# Patient Record
Sex: FEMALE | Race: WHITE | Employment: OTHER | URBAN - METROPOLITAN AREA
[De-identification: names, ages, dates, MRNs, and addresses within clinical notes are randomized per-mention and may not be internally consistent; named-entity substitution may affect disease eponyms.]

---

## 2018-07-21 ENCOUNTER — ED HISTORICAL/CONVERTED ENCOUNTER (OUTPATIENT)
Dept: OTHER | Age: 28
End: 2018-07-21

## 2019-03-13 ENCOUNTER — HOSPITAL ENCOUNTER (OUTPATIENT)
Dept: MRI IMAGING | Age: 29
Discharge: HOME | End: 2019-03-13
Attending: FAMILY MEDICINE
Payer: MEDICAID

## 2019-03-13 ENCOUNTER — HOSPITAL ENCOUNTER (OUTPATIENT)
Dept: GENERAL RADIOLOGY | Age: 29
Discharge: HOME | End: 2019-03-13
Attending: FAMILY MEDICINE
Payer: MEDICAID

## 2019-03-13 DIAGNOSIS — M75.111 INCOMPLETE TEAR OF RIGHT ROTATOR CUFF: ICD-10-CM

## 2019-03-13 DIAGNOSIS — S43.431A TEAR OF RIGHT GLENOID LABRUM: ICD-10-CM

## 2019-03-13 PROCEDURE — 74011250636 HC RX REV CODE- 250/636: Performed by: RADIOLOGY

## 2019-03-13 PROCEDURE — A9575 INJ GADOTERATE MEGLUMI 0.1ML: HCPCS | Performed by: RADIOLOGY

## 2019-03-13 PROCEDURE — 23350 INJECTION FOR SHOULDER X-RAY: CPT

## 2019-03-13 PROCEDURE — 74011636320 HC RX REV CODE- 636/320: Performed by: RADIOLOGY

## 2019-03-13 PROCEDURE — 73222 MRI JOINT UPR EXTREM W/DYE: CPT

## 2019-03-13 RX ORDER — GADOTERATE MEGLUMINE 376.9 MG/ML
2 INJECTION INTRAVENOUS
Status: COMPLETED | OUTPATIENT
Start: 2019-03-13 | End: 2019-03-13

## 2019-03-13 RX ORDER — LIDOCAINE HYDROCHLORIDE 10 MG/ML
5 INJECTION, SOLUTION EPIDURAL; INFILTRATION; INTRACAUDAL; PERINEURAL
Status: COMPLETED | OUTPATIENT
Start: 2019-03-13 | End: 2019-03-13

## 2019-03-13 RX ADMIN — LIDOCAINE HYDROCHLORIDE 5 ML: 10 INJECTION, SOLUTION EPIDURAL; INFILTRATION; INTRACAUDAL; PERINEURAL at 14:53

## 2019-03-13 RX ADMIN — IOHEXOL 10 ML: 300 INJECTION, SOLUTION INTRAVENOUS at 14:53

## 2019-03-13 RX ADMIN — GADOTERATE MEGLUMINE 2 ML: 376.9 INJECTION INTRAVENOUS at 14:53

## 2019-04-19 ENCOUNTER — OFFICE VISIT (OUTPATIENT)
Dept: FAMILY MEDICINE CLINIC | Age: 29
End: 2019-04-19

## 2019-04-19 VITALS
TEMPERATURE: 98.6 F | OXYGEN SATURATION: 100 % | DIASTOLIC BLOOD PRESSURE: 87 MMHG | RESPIRATION RATE: 18 BRPM | HEART RATE: 90 BPM | BODY MASS INDEX: 24.11 KG/M2 | SYSTOLIC BLOOD PRESSURE: 128 MMHG | HEIGHT: 62 IN | WEIGHT: 131 LBS

## 2019-04-19 DIAGNOSIS — F41.1 GAD (GENERALIZED ANXIETY DISORDER): Primary | ICD-10-CM

## 2019-04-19 RX ORDER — LEVONORGESTREL AND ETHINYL ESTRADIOL 0.1-0.02MG
KIT ORAL
Refills: 3 | COMMUNITY
Start: 2019-04-10 | End: 2020-06-16

## 2019-04-19 RX ORDER — CLONAZEPAM 0.5 MG/1
0.5 TABLET ORAL
Qty: 30 TAB | Refills: 0 | Status: SHIPPED | OUTPATIENT
Start: 2019-04-19 | End: 2019-07-15 | Stop reason: SDUPTHER

## 2019-04-19 RX ORDER — NAPROXEN 500 MG/1
TABLET ORAL
COMMUNITY
Start: 2019-03-18 | End: 2020-01-05

## 2019-04-19 NOTE — PROGRESS NOTES
Chief Complaint Patient presents with [address] [place] [address]  Anxiety Patient in office today to [facility]. Pt would like to discuss med regimen for anxiety. Pt has hx of PTSD and bipolar. Pt is currently seeing therapy John Reyes @ [facility]. Pt has previously been diagnosed with bipolar about 8 years ago. Has not recently been on meds and cannot recall what she was on before. Thinks that PTSD triggered her bipolar. Better controlled now. Seeing therapist weekly. Would prefer to take something as needed. Only having episodic anxiety that happens every few weeks. Usually will experience increased SOB, dyspnea, fidgety, CP, heart racing. Depends on the situation with how long it lasts. Denies any personal or family history of addiction or substance abuse. Denies any SI. Currently on medical leave for shoulder injury. Seeing Dr. Velma Bolton. Denies any other concerns at this time. Chief Complaint Patient presents with [address] [place] [address]  Anxiety  
 
she is a 33y.o. year old female who presents for evalution. Reviewed PmHx, RxHx, FmHx, SocHx, AllgHx and updated and dated in the chart. Review of Systems - negative except as listed above in the HPI Objective:  
 
Vitals:  
 04/19/19 1350 BP: 128/87 Pulse: 90 Resp: 18 Temp: 98.6 °F (37 °C) SpO2: 100% Weight: 131 lb (59.4 kg) Height: 5' 2\" (1.575 m) Physical Examination: General appearance - alert, well appearing, and in no distress Mental status - normal mood, behavior, speech, dress, motor activity, and thought processes Chest - clear to auscultation, no wheezes, rales or rhonchi, symmetric air entry Heart - normal rate, regular rhythm, normal S1, S2, no murmurs Assessment/ Plan:  
Diagnoses and all orders for this visit: 1. DEANDRA (generalized anxiety disorder) -     clonazePAM (KLONOPIN) 0.5 mg tablet; Take 1 Tab by mouth daily as needed (anixety). Start klonopin daily as needed for acute anxiety sx. Reinforced SEs/ADRs, only taking as prescribed, do not mix with etoh, and taking for acute sx of anxiety only. Enc pt to follow up if anxiety sx persist or worsen to discuss alt med regimen. Follow-up and Dispositions · Return if symptoms worsen or fail to improve. I have discussed the diagnosis with the patient and the intended plan as seen in the above orders. The patient has received an after-visit summary and questions were answered concerning future plans. Medication Side Effects and Warnings were discussed with patient: yes Patient Labs were reviewed and or requested: no 
Patient Past Records were reviewed and or requested  yes Patient / Caregiver Understanding of treatment plan was verbalized during office visit YES SHANTHI Delarosa Patient Instructions Benzodiazepines: Potential side effects of benzodiazepine medications include, but are not limited to, the possibility of \"paradoxical agitation\" with irritability, aggressiveness or stimulated behavior; clumsiness, slurring of speech, dulled facies, psychomotor impairment, anterograde amnesia, impaired awareness of degree of drug effect, visual and hearing sensitivity impairment, other psychiatric/behavioral disturbances, impacts operating certain machinery or engaging in certain activities or employment, anxiety, insomnia, anorexia, tremor, nausea, vomiting, diarrhea and potential to develop tolerance, dependence, addiction and death from overdose. Use caution while taking benzodiazepines. There is a potential to overdose on this medication when too many pills are taken at one time. Do not mix alcohol with this medication because it can worsen side effects and be very dangerous.

## 2019-04-19 NOTE — PROGRESS NOTES
Chief Complaint Patient presents with [facility] Establish Care  Anxiety Patient in office today to [facility] care. Pt would like to discuss med regimen for anxiety. Pt has hx of PTSD and bipolar. Pt is currently seeing therapy William Zuniga @ [facility].

## 2019-04-29 ENCOUNTER — DOCUMENTATION ONLY (OUTPATIENT)
Dept: FAMILY MEDICINE CLINIC | Age: 29
End: 2019-04-29

## 2019-04-29 NOTE — PROGRESS NOTES
Grey Arias 984 for medical records was faxed on 4/26/19 to [address] [place] [address] to be processed

## 2019-05-30 NOTE — PERIOP NOTES
[address] [address] [facility]                            MAIN OR                                  [phone]   MAIN PRE OP                          [phone]                                                                                AMBULATORY PRE OP          [phone]  PRE-ADMISSION TESTING    [phone]     Surgery Date:   6/12/19         Is surgery arrival time given by surgeon? NO  If NO, [facility] staff will call you between 3 and 7pm the day before your surgery with your arrival time. (If your surgery is on a Monday, we will call you the Friday before.)    Call [phone] after 7pm Monday-Friday if you did not receive your arrival time. INSTRUCTIONS BEFORE YOUR SURGERY   When You  Arrive   Arrive at the 2nd [address] [facility] on the day of your surgery  Have your insurance card, photo ID, and any copayment (if needed)     Food   and   Drink   NO food or drink after midnight the night before surgery    This means NO water, gum, mints, coffee, juice, etc.  No alcohol (beer, wine, liquor) 24 hours before and after surgery     Medications to   TAKE   Morning of Surgery   MEDICATIONS TO TAKE THE MORNING OF SURGERY WITH A SIP OF WATER:    Tylenol, Klonopin      Medications  To  STOP      7 days before surgery    Non-Steroidal anti-inflammatory Drugs (NSAID's): for example, Ibuprofen (Advil, Motrin), Naproxen (Aleve)   Aspirin, if taking for pain    Herbal supplements, vitamins, and fish oil   Other:  (Pain medications not listed above, including Tylenol may be taken)   Blood  Thinners    If you take  Aspirin, Plavix, Coumadin, or any blood-thinning or anti-blood clot medicine, talk to the doctor who prescribed the medications for pre-operative instructions.      Bathing Clothing  Jewelry  Valuables       If you shower the morning of surgery, please do not apply anything to your skin (lotions, powders, deodorant, or makeup, especially mascara)   Follow all special bath instructions (for total joint replacement, spine and bowel surgeries)   Do not shave or trim anywhere 24 hours before surgery   Wear your hair loose or down; no pony-tails, buns, or metal hair clips   Wear loose, comfortable, clean clothes   Wear glasses instead of contacts   Leave money, valuables, and jewelry, including body piercings, at home     Going Home       or Spending the Night    SAME-DAY SURGERY: You must have a responsible adult drive you home and stay with you 24 hours after surgery   ADMITS: If your doctor is keeping you into the hospital after surgery, leave personal belongings/luggage in your car until you have a hospital room number. Hospital discharge time is 12 noon  Drivers must be here before 12 noon unless you are told differently   Special Instructions   Free  parking after 7am, no tipping. Follow all instructions so your surgery wont be cancelled. Please, be on time. If a situation occurs and you are delayed the day of surgery, call [phone] or          [phone]. If your physical condition changes (like a fever, cold, flu, etc.) call your surgeon. The patient was contacted  via phone. Home medication reviewed and verified during PAT appointment. The patient verbalizes understanding of all instructions and does not  need reinforcement.

## 2019-06-11 ENCOUNTER — ANESTHESIA EVENT (OUTPATIENT)
Dept: SURGERY | Age: 29
End: 2019-06-11
Payer: MEDICAID

## 2019-06-11 RX ORDER — SODIUM CHLORIDE 0.9 % (FLUSH) 0.9 %
5-40 SYRINGE (ML) INJECTION EVERY 8 HOURS
Status: CANCELLED | OUTPATIENT
Start: 2019-06-11

## 2019-06-11 RX ORDER — DIPHENHYDRAMINE HYDROCHLORIDE 50 MG/ML
12.5 INJECTION, SOLUTION INTRAMUSCULAR; INTRAVENOUS AS NEEDED
Status: CANCELLED | OUTPATIENT
Start: 2019-06-11 | End: 2019-06-11

## 2019-06-11 RX ORDER — SODIUM CHLORIDE 0.9 % (FLUSH) 0.9 %
5-40 SYRINGE (ML) INJECTION AS NEEDED
Status: CANCELLED | OUTPATIENT
Start: 2019-06-11

## 2019-06-11 RX ORDER — SODIUM CHLORIDE, SODIUM LACTATE, POTASSIUM CHLORIDE, CALCIUM CHLORIDE 600; 310; 30; 20 MG/100ML; MG/100ML; MG/100ML; MG/100ML
125 INJECTION, SOLUTION INTRAVENOUS CONTINUOUS
Status: CANCELLED | OUTPATIENT
Start: 2019-06-11

## 2019-06-11 RX ORDER — HYDROMORPHONE HYDROCHLORIDE 1 MG/ML
.25-1 INJECTION, SOLUTION INTRAMUSCULAR; INTRAVENOUS; SUBCUTANEOUS
Status: CANCELLED | OUTPATIENT
Start: 2019-06-11

## 2019-06-12 ENCOUNTER — ANESTHESIA (OUTPATIENT)
Dept: SURGERY | Age: 29
End: 2019-06-12
Payer: MEDICAID

## 2019-06-12 ENCOUNTER — HOSPITAL ENCOUNTER (OUTPATIENT)
Age: 29
Discharge: HOME | End: 2019-06-12
Attending: STUDENT IN AN ORGANIZED HEALTH CARE EDUCATION/TRAINING PROGRAM | Admitting: STUDENT IN AN ORGANIZED HEALTH CARE EDUCATION/TRAINING PROGRAM
Payer: MEDICAID

## 2019-06-12 VITALS
OXYGEN SATURATION: 98 % | RESPIRATION RATE: 18 BRPM | HEART RATE: 95 BPM | TEMPERATURE: 97.6 F | HEIGHT: 62 IN | BODY MASS INDEX: 24.14 KG/M2 | DIASTOLIC BLOOD PRESSURE: 85 MMHG | SYSTOLIC BLOOD PRESSURE: 125 MMHG | WEIGHT: 131.17 LBS

## 2019-06-12 PROBLEM — N87.1 CIN II (CERVICAL INTRAEPITHELIAL NEOPLASIA II): Status: ACTIVE | Noted: 2019-06-12

## 2019-06-12 LAB — HCG UR QL: NEGATIVE

## 2019-06-12 PROCEDURE — 77030007304 HC ELECTRD LP RND MEGA -A: Performed by: STUDENT IN AN ORGANIZED HEALTH CARE EDUCATION/TRAINING PROGRAM

## 2019-06-12 PROCEDURE — 76060000061 HC AMB SURG ANES 0.5 TO 1 HR: Performed by: STUDENT IN AN ORGANIZED HEALTH CARE EDUCATION/TRAINING PROGRAM

## 2019-06-12 PROCEDURE — 76030000000 HC AMB SURG OR TIME 0.5 TO 1: Performed by: STUDENT IN AN ORGANIZED HEALTH CARE EDUCATION/TRAINING PROGRAM

## 2019-06-12 PROCEDURE — 77030003666 HC NDL SPINAL BD -A: Performed by: STUDENT IN AN ORGANIZED HEALTH CARE EDUCATION/TRAINING PROGRAM

## 2019-06-12 PROCEDURE — 76210000034 HC AMBSU PH I REC 0.5 TO 1 HR: Performed by: STUDENT IN AN ORGANIZED HEALTH CARE EDUCATION/TRAINING PROGRAM

## 2019-06-12 PROCEDURE — 77030020782 HC GWN BAIR PAWS FLX 3M -B

## 2019-06-12 PROCEDURE — 74011250636 HC RX REV CODE- 250/636

## 2019-06-12 PROCEDURE — 81025 URINE PREGNANCY TEST: CPT

## 2019-06-12 PROCEDURE — 77030011640 HC PAD GRND REM COVD -A: Performed by: STUDENT IN AN ORGANIZED HEALTH CARE EDUCATION/TRAINING PROGRAM

## 2019-06-12 PROCEDURE — 77030018722 HC ELCTRD BALL LEEP UTMD -B: Performed by: STUDENT IN AN ORGANIZED HEALTH CARE EDUCATION/TRAINING PROGRAM

## 2019-06-12 PROCEDURE — 76210000046 HC AMBSU PH II REC FIRST 0.5 HR: Performed by: STUDENT IN AN ORGANIZED HEALTH CARE EDUCATION/TRAINING PROGRAM

## 2019-06-12 PROCEDURE — 74011000250 HC RX REV CODE- 250: Performed by: STUDENT IN AN ORGANIZED HEALTH CARE EDUCATION/TRAINING PROGRAM

## 2019-06-12 PROCEDURE — 74011250636 HC RX REV CODE- 250/636: Performed by: ANESTHESIOLOGY

## 2019-06-12 PROCEDURE — 88307 TISSUE EXAM BY PATHOLOGIST: CPT

## 2019-06-12 PROCEDURE — 77030032490 HC SLV COMPR SCD KNE COVD -B

## 2019-06-12 RX ORDER — ONDANSETRON 2 MG/ML
INJECTION INTRAMUSCULAR; INTRAVENOUS AS NEEDED
Status: DISCONTINUED | OUTPATIENT
Start: 2019-06-12 | End: 2019-06-12 | Stop reason: HOSPADM

## 2019-06-12 RX ORDER — SODIUM CHLORIDE 0.9 % (FLUSH) 0.9 %
5-40 SYRINGE (ML) INJECTION AS NEEDED
Status: DISCONTINUED | OUTPATIENT
Start: 2019-06-12 | End: 2019-06-12 | Stop reason: HOSPADM

## 2019-06-12 RX ORDER — SODIUM CHLORIDE 0.9 % (FLUSH) 0.9 %
5-40 SYRINGE (ML) INJECTION EVERY 8 HOURS
Status: DISCONTINUED | OUTPATIENT
Start: 2019-06-12 | End: 2019-06-12 | Stop reason: HOSPADM

## 2019-06-12 RX ORDER — FLUMAZENIL 0.1 MG/ML
0.2 INJECTION INTRAVENOUS
Status: DISCONTINUED | OUTPATIENT
Start: 2019-06-12 | End: 2019-06-12 | Stop reason: HOSPADM

## 2019-06-12 RX ORDER — DEXAMETHASONE SODIUM PHOSPHATE 4 MG/ML
INJECTION, SOLUTION INTRA-ARTICULAR; INTRALESIONAL; INTRAMUSCULAR; INTRAVENOUS; SOFT TISSUE AS NEEDED
Status: DISCONTINUED | OUTPATIENT
Start: 2019-06-12 | End: 2019-06-12 | Stop reason: HOSPADM

## 2019-06-12 RX ORDER — PROPOFOL 10 MG/ML
INJECTION, EMULSION INTRAVENOUS AS NEEDED
Status: DISCONTINUED | OUTPATIENT
Start: 2019-06-12 | End: 2019-06-12 | Stop reason: HOSPADM

## 2019-06-12 RX ORDER — LIDOCAINE HYDROCHLORIDE AND EPINEPHRINE 10; 10 MG/ML; UG/ML
INJECTION, SOLUTION INFILTRATION; PERINEURAL AS NEEDED
Status: DISCONTINUED | OUTPATIENT
Start: 2019-06-12 | End: 2019-06-12 | Stop reason: HOSPADM

## 2019-06-12 RX ORDER — FENTANYL CITRATE 50 UG/ML
INJECTION, SOLUTION INTRAMUSCULAR; INTRAVENOUS AS NEEDED
Status: DISCONTINUED | OUTPATIENT
Start: 2019-06-12 | End: 2019-06-12 | Stop reason: HOSPADM

## 2019-06-12 RX ORDER — PROPOFOL 10 MG/ML
INJECTION, EMULSION INTRAVENOUS
Status: DISCONTINUED | OUTPATIENT
Start: 2019-06-12 | End: 2019-06-12 | Stop reason: HOSPADM

## 2019-06-12 RX ORDER — NALOXONE HYDROCHLORIDE 0.4 MG/ML
0.04 INJECTION, SOLUTION INTRAMUSCULAR; INTRAVENOUS; SUBCUTANEOUS
Status: DISCONTINUED | OUTPATIENT
Start: 2019-06-12 | End: 2019-06-12 | Stop reason: HOSPADM

## 2019-06-12 RX ORDER — SODIUM CHLORIDE, SODIUM LACTATE, POTASSIUM CHLORIDE, CALCIUM CHLORIDE 600; 310; 30; 20 MG/100ML; MG/100ML; MG/100ML; MG/100ML
125 INJECTION, SOLUTION INTRAVENOUS CONTINUOUS
Status: DISCONTINUED | OUTPATIENT
Start: 2019-06-12 | End: 2019-06-12 | Stop reason: HOSPADM

## 2019-06-12 RX ORDER — LIDOCAINE HYDROCHLORIDE 10 MG/ML
0.1 INJECTION, SOLUTION EPIDURAL; INFILTRATION; INTRACAUDAL; PERINEURAL AS NEEDED
Status: DISCONTINUED | OUTPATIENT
Start: 2019-06-12 | End: 2019-06-12 | Stop reason: HOSPADM

## 2019-06-12 RX ORDER — LIDOCAINE HYDROCHLORIDE 20 MG/ML
INJECTION, SOLUTION EPIDURAL; INFILTRATION; INTRACAUDAL; PERINEURAL AS NEEDED
Status: DISCONTINUED | OUTPATIENT
Start: 2019-06-12 | End: 2019-06-12 | Stop reason: HOSPADM

## 2019-06-12 RX ORDER — FERRIC SUBSULFATE 20-22G/100
SOLUTION, NON-ORAL MISCELLANEOUS AS NEEDED
Status: DISCONTINUED | OUTPATIENT
Start: 2019-06-12 | End: 2019-06-12 | Stop reason: HOSPADM

## 2019-06-12 RX ORDER — MIDAZOLAM HYDROCHLORIDE 1 MG/ML
INJECTION, SOLUTION INTRAMUSCULAR; INTRAVENOUS AS NEEDED
Status: DISCONTINUED | OUTPATIENT
Start: 2019-06-12 | End: 2019-06-12 | Stop reason: HOSPADM

## 2019-06-12 RX ADMIN — PROPOFOL 20 MG: 10 INJECTION, EMULSION INTRAVENOUS at 11:30

## 2019-06-12 RX ADMIN — PROPOFOL 20 MG: 10 INJECTION, EMULSION INTRAVENOUS at 11:34

## 2019-06-12 RX ADMIN — LIDOCAINE HYDROCHLORIDE 20 MG: 20 INJECTION, SOLUTION EPIDURAL; INFILTRATION; INTRACAUDAL; PERINEURAL at 11:16

## 2019-06-12 RX ADMIN — MIDAZOLAM HYDROCHLORIDE 2 MG: 1 INJECTION, SOLUTION INTRAMUSCULAR; INTRAVENOUS at 11:15

## 2019-06-12 RX ADMIN — PROPOFOL 100 MCG/KG/MIN: 10 INJECTION, EMULSION INTRAVENOUS at 11:16

## 2019-06-12 RX ADMIN — MIDAZOLAM HYDROCHLORIDE 3 MG: 1 INJECTION, SOLUTION INTRAMUSCULAR; INTRAVENOUS at 11:12

## 2019-06-12 RX ADMIN — FENTANYL CITRATE 25 MCG: 50 INJECTION, SOLUTION INTRAMUSCULAR; INTRAVENOUS at 11:30

## 2019-06-12 RX ADMIN — FENTANYL CITRATE 25 MCG: 50 INJECTION, SOLUTION INTRAMUSCULAR; INTRAVENOUS at 11:20

## 2019-06-12 RX ADMIN — SODIUM CHLORIDE, POTASSIUM CHLORIDE, SODIUM LACTATE AND CALCIUM CHLORIDE: 600; 310; 30; 20 INJECTION, SOLUTION INTRAVENOUS at 11:12

## 2019-06-12 RX ADMIN — DEXAMETHASONE SODIUM PHOSPHATE 4 MG: 4 INJECTION, SOLUTION INTRA-ARTICULAR; INTRALESIONAL; INTRAMUSCULAR; INTRAVENOUS; SOFT TISSUE at 11:41

## 2019-06-12 RX ADMIN — ONDANSETRON 4 MG: 2 INJECTION INTRAMUSCULAR; INTRAVENOUS at 11:41

## 2019-06-12 NOTE — OP NOTES
[facility]  OPERATIVE REPORT    Name:  Alberto Chacon  MR#:  [id number]  :  1990  ACCOUNT #:  [de-identified]  DATE OF SERVICE:  2019    PREOPERATIVE DIAGNOSIS:  A 26year-old,  6, para 3 with cervical intraepithelial neoplasia 2 of the ectocervix. POSTOPERATIVE DIAGNOSIS:  A 26year-old,  6, para 3 with cervical intraepithelial neoplasia 2 of the ectocervix. PROCEDURE PERFORMED:  Loop electrocautery excisional procedure of the cervix. SURGEON:  Solomon Aaron DO    ASSISTANT:  None. ANESTHESIA:  MAC anesthesia with paracervical block. COMPLICATIONS:  None. SPECIMENS REMOVED:  Ectocervical LEEP. IMPLANTS:  None    ESTIMATED BLOOD LOSS:  10 mL    IV FLUIDS:  800 mL of crystalloid    URINE OUTPUT:  The patient voided prior to the procedure. FINDINGS:  Previous colposcopic biopsy at 2 and 6 o'clock with SARAH 2.    DESCRIPTION OF PROCEDURE IN DETAIL:  The patient was taken to the operating room and positioned on the operating room table in supine position. After adequate anesthesia was achieved via MAC anesthesia, she had her legs positioned with the candy cane stirrups. After a time-out was performed, an inflated speculum was inserted into the vagina. The cervix was infiltrated with a total of 20 mL of 1% lidocaine with epinephrine. An electrocautery loop was then used to perform the excisional procedure of the cervix. The specimen was tagged at the 12 o'clock position with a suture. The LEEP bed was cauterized using a rollerball and Monsel solution was applied. The speculum was then removed from the vagina. All counts were correct at the end of the case. There were no complications.       Tamiko Stroud DO      [id number]/  D:  2019 11:48  T:  2019 14:21  JOB #:  [id number]  CC:  Solomon Aaron DO

## 2019-06-12 NOTE — BRIEF OP NOTE
BRIEF OPERATIVE NOTE    Date of Procedure: 6/12/2019   Preoperative Diagnosis: CERVICAL DYSPLASIA II  Postoperative Diagnosis: CERVICAL DYSPLASIA II    Procedure(s):  LOOP ELECTRODE EXCISION PROCEDURE OF CERVIX (LEEP) (LATEX ALLERGY)  Surgeon(s) and Role:     Yannick Roman DO - Primary         Surgical Assistant: None    Surgical Staff:  Circ-1: Flakita Juarez, RN  Scrub RN-1: Rojas Prabhakar RN  Event Time In Time Out   Incision Start 1133    Incision Close 1142      Anesthesia: MAC with paracervical block   Estimated Blood Loss: 10cc, IVF 800cc crystalloid, UOP -pt voided just prior to procedure  Specimens: Ectocervical LEEP   Findings: previous colposcopic biopsy at 2 and 6 'o' clock revealed SARAH II  Complications: None  Implants: * No implants in log *

## 2019-06-12 NOTE — DISCHARGE INSTRUCTIONS
Discharge Instructions for outpatient GYN surgery    Patient ID:  Emely Monroe  [id number]  84 y.o.  1990    Take Home Medications -Take over the counter Aleve (Naproxen)  500mg Q12H or Ibuprofen (600-800mg) Q6-8H as needed for pain     Follow-up with Dr Lisa Sanchez in 3 weeks call office for appointment, [phone]    Follow-up care is a key part of your treatment and safety. Be sure to schedule and keep all recommended follow up appointments    DISCHARGE SUMMARY from your Nurse      PATIENT INSTRUCTIONS    After general anesthesia or intravenous sedation, for 24 hours or while taking prescription Narcotics:  · Limit your activities  · Do not drive and operate hazardous machinery  · Do not make important personal or business decisions  · Do  not drink alcoholic beverages  · If you have not urinated within 8 hours after discharge, please contact your surgeon on call. Report the following to your surgeon:  · Excessive pain, swelling, redness or odor of or around the surgical area  · Temperature over 100.5  · Nausea and vomiting lasting longer than 4 hours or if unable to take medications  · Any signs of decreased circulation or nerve impairment to extremity: change in color, persistent  numbness, tingling, coldness or increase pain  · Any questions      COUGH AND DEEP BREATHE    Breathing deeply and coughing are very important exercises to do after surgery. Deep breathing and coughing open the little air tubes and air sacks in your lungs. You take deep breaths every day. You may not even notice - it is just something you do when you sigh or yawn. It is a natural exercise you do to keep these air passages open. After surgery, take deep breaths and cough, on purpose. DIRECTIONS:  · Take 10 to 15 slow deep breaths every hour while awake. · Breathe in deeply, and hold it for 2 seconds. · Exhale slowly through puckered lips, like blowing up a balloon.   · After every 4th or 5th deep breath, hug your pillow to your chest or belly and give a hard, deep cough. Yes, it will probably hurt. But doing this exercise is a very important part of healing after surgery. Take your pain medicine to help you do this exercise without too much pain. Coughing and deep breathing help prevent bronchitis and pneumonia after surgery. If you had chest or belly surgery, use a pillow as a \"hug michael\" and hold it tightly to your chest or belly when you cough. ANKLE PUMPS    Ankle pumps increase the circulation of oxygenated blood to your lower extremities and decrease your risk for circulation problems such as blood clots. They also stretch the muscles, tendons and ligaments in your foot and ankle, and prevent joint contracture in the ankle and foot, especially after surgeries on the legs. It is important to do ankle pump exercises regularly after surgery because immobility increases your risk for developing a blood clot. Your doctor may also have you take an Aspirin for the next few days as well. If your doctor did not ask you to take an Aspirin, consult with him before starting Aspirin therapy on your own. The exercise is quite simple. · Slowly point your foot forward, feeling the muscles on the top of your lower leg stretch, and hold this position for 5 seconds. · Next, pull your foot back toward you as far as possible, stretching the calf muscles, and hold that position for 5 seconds. · Repeat with the other foot. · Perform 10 repetitions every hour while awake for both ankles if possible (down and then up with the foot once is one repetition). You should feel gentle stretching of the muscles in your lower leg when doing this exercise. If you feel pain, or your range of motion is limited, don't push too hard. Only go the limit your joint and muscles will let you go.   If you have increasing pain, progressively worsening leg warmth or swelling, STOP the exercise and call your doctor. MEDICATION AND   SIDE EFFECT GUIDE    The [facility] MEDICATION AND SIDE EFFECT GUIDE was provided to the PATIENT AND CARE PROVIDER. Information provided includes instruction about drug purpose and common side effects for the following medications:   · none        These are general instructions for a healthy lifestyle:    *   Please give a list of your current medications to your Primary Care Provider. *   Please update this list whenever your medications are discontinued, doses are changed, or new medications (including over-the-counter products) are added. *   Please carry medication information at all times in case of emergency situations. About Smoking  No smoking / No tobacco products  Avoid exposure to second hand smoke     Surgeon General's Warning:  Quitting smoking now greatly reduces serious risk to your health. Obesity, smoking, and sedentary lifestyle greatly increases your risk for illness and disease. A healthy diet, regular physical exercise & weight monitoring are important for maintaining a healthy lifestyle. Congestive Heart Failure  You may be retaining fluid if you have a history of heart failure or if you experience any of the following symptoms:  Weight gain of 3 pounds or more overnight or 5 pounds in a week, increased swelling in your hands or feet or shortness of breath while lying flat in bed. Please call your doctor as soon as you notice any of these symptoms; do not wait until your next office visit. Recognize signs and symptoms of STROKE:  F -  Face looks uneven  A -  Arms unable to move or move evenly  S -  Speech slurred or non-existent  T -  Time-call 911 as soon as signs and symptoms begin-DO NOT go          back to bed or wait to see if you get better-TIME IS BRAIN. Warning Signs of HEART ATTACK   Call 911 if you have these symptoms:     Chest discomfort.  Most heart attacks involve discomfort in the center of the chest that lasts more than a few minutes, or that goes away and comes back. It can feel like uncomfortable pressure, squeezing, fullness, or pain.  Discomfort in other areas of the upper body. Symptoms can include pain or discomfort in one or both arms, the back, neck, jaw, or stomach.  Shortness of breath with or without chest discomfort.  Other signs may include breaking out in a cold sweat, nausea, or lightheadedness. Don't wait more than five minutes to call 911 - MINUTES MATTER! Fast action can save your life. Calling 911 is almost always the fastest way to get lifesaving treatment. Emergency Medical Services staff can begin treatment when they arrive -- up to an hour sooner than if someone gets to the hospital by car. The discharge information has been reviewed with the patient and caregiver. Any questions and concerns from the patient and caregiver have been addressed. The patient and caregiver verbalized understanding. Other information in your discharge envelope:  []     PRESCRIPTIONS  []     PHYSICAL THERAPY PRESCRIPTION  []     APPOINTMENT CARDS  []     Regional Anesthesia Pamphlet for block or block with On-Q Catheter from   Anesthesia Service  []     Medical device information sheets/pamphlets from their    []     School/work excuse note. []     /parent work excuse note. The following personal items collected during your admission are returned to you:   Dental Appliance: Dental Appliances: None  Vision: Visual Aid: Glasses  Hearing Aid:    Jewelry: Jewelry: None  Clothing: Clothing: Pants, Shirt, Undergarments, Footwear  Other Valuables: Other Valuables: None  Valuables sent to safe:      What to Expect at [facility] might feel a bit sleepy, groggy or nauseous today because of the anesthetic or pain medication you received. Do not drive today. These symptoms should resolve by tomorrow.   You will probably feel some cramping over the next day or two- this is normal.  You may take an over-the-counter pain medication such as Tylenol, Aleve, Motrin, Advil (ibuprofen) or you may have been given a prescription pain medication. Try to limit use of prescription pain medication to just a day or two, as they can cause constipation. You will probably experience some light vaginal bleeding or spotting. This is normal.     How can you care for yourself at home? Activity  Pelvic rest for 2-4 weeks (nothing in your vagina such as tampons, intercourse or douching)  You man return to work and normal activities tomorrow or the next day. If you are feeling well, you can also resume exercise. If you are having pain or not feeling well, you should wait a few days before exercising. Diet  Regular diet as tolerated  Drink plenty of fluids    Medicines  Take pain medicines as directed by your doctor. If you a prescription for pain medicine, take as needed . If you are not taking a prescription pain medicine, take an over-the-counter medicine such as acetaminophen (Tylenol), ibuprofen (Advil, Motrin), or naproxen (Aleve). Read and follow all instructions on the label. Do not take two or more pain medicines at the same time unless the doctor told you to. Many pain medicines contain acetaminophen, which is Tylenol. Too much Tylenol can be harmful. If your doctor prescribed antibiotics, take them as directed. Do not stop taking them just because you feel better. If you think your pain medicine is making your stomach upset:  Take your medicine after meals (unless your doctor tells you not to). Ask your doctor for a different pain medicine. Call your doctor  or seek immediate medical care if you have:  bright red vaginal bleeding that soaks one or more pads in an hour, or you have large clots. foul-smelling discharge from your vagina. persistent nausea and vomiting  pain that does not get better after you take pain medicine.   signs of infection, such as:  Increased pain, swelling, warmth, or redness. A persistent fever of 101.5 F  signs of a blood clot, such as:  Pain, redness or swelling in your lower extremeties  You have trouble passing urine or stool, especially if you have pain or swelling in your lower belly. hot flashes, sweating, flushing, or a fast or pounding heartbeat.:  no bowel movement after taking a laxative. loss of consciousness  sudden chest pain and shortness of breath, or you cough up blood.   persistent abdominal pain despite taking pain medication

## 2019-06-12 NOTE — H&P
H&P Day of Surgery Update     Pt seen and examined. Please see paper H&P from the office on 6/3/19. No interval changes. Diagnosis is SARAH II. Planned procedure is LEEP. Consents reviewed and signed, questions answered. No ABX or DVT  PPx indicated. Proceed to OR.      Daniel Dougherty,

## 2019-06-12 NOTE — ANESTHESIA POSTPROCEDURE EVALUATION
Procedure(s):  LOOP ELECTRODE EXCISION PROCEDURE OF CERVIX (LEEP) (LATEX ALLERGY). MAC    Anesthesia Post Evaluation      Multimodal analgesia: multimodal analgesia not used between 6 hours prior to anesthesia start to PACU discharge  Patient location during evaluation: PACU  Patient participation: complete - patient participated  Level of consciousness: awake  Pain management: adequate  Airway patency: patent  Anesthetic complications: no  Cardiovascular status: acceptable, blood pressure returned to baseline and hemodynamically stable  Respiratory status: acceptable  Hydration status: acceptable  Post anesthesia nausea and vomiting:  controlled      Vitals Value Taken Time   /83 6/12/2019 12:20 PM   Temp 36.4 °C (97.5 °F) 6/12/2019 11:51 AM   Pulse 97 6/12/2019 12:23 PM   Resp 18 6/12/2019 12:23 PM   SpO2 100 % 6/12/2019 12:23 PM   Vitals shown include unvalidated device data.

## 2019-06-12 NOTE — ANESTHESIA PREPROCEDURE EVALUATION
Relevant Problems   No relevant active problems       Anesthetic History   No history of anesthetic complications            Review of Systems / Medical History  Patient summary reviewed and pertinent labs reviewed    Pulmonary  Within defined limits                 Neuro/Psych         Psychiatric history    Comments: Bipolar  PTSD Cardiovascular    Hypertension              Exercise tolerance: >4 METS     GI/Hepatic/Renal  Within defined limits              Endo/Other             Other Findings              Physical Exam    Airway  Mallampati: II  TM Distance: 4 - 6 cm  Neck ROM: normal range of motion   Mouth opening: Normal     Cardiovascular  Regular rate and rhythm,  S1 and S2 normal,  no murmur, click, rub, or gallop  Rhythm: regular  Rate: normal         Dental  No notable dental hx       Pulmonary  Breath sounds clear to auscultation               Abdominal  GI exam deferred       Other Findings            Anesthetic Plan    ASA: 2  Anesthesia type: MAC          Induction: Intravenous  Anesthetic plan and risks discussed with: Patient

## 2019-06-23 ENCOUNTER — HOSPITAL ENCOUNTER (EMERGENCY)
Age: 29
Discharge: HOME | End: 2019-06-23
Attending: EMERGENCY MEDICINE
Payer: MEDICAID

## 2019-06-23 VITALS
SYSTOLIC BLOOD PRESSURE: 119 MMHG | DIASTOLIC BLOOD PRESSURE: 78 MMHG | RESPIRATION RATE: 14 BRPM | TEMPERATURE: 98.6 F | WEIGHT: 130 LBS | BODY MASS INDEX: 23.92 KG/M2 | HEART RATE: 86 BPM | OXYGEN SATURATION: 99 % | HEIGHT: 62 IN

## 2019-06-23 DIAGNOSIS — N93.9 VAGINAL BLEEDING: Primary | ICD-10-CM

## 2019-06-23 LAB
ANION GAP SERPL CALC-SCNC: 6 MMOL/L (ref 5–15)
BUN SERPL-MCNC: 8 MG/DL (ref 6–20)
BUN/CREAT SERPL: 9 (ref 12–20)
CALCIUM SERPL-MCNC: 8.7 MG/DL (ref 8.5–10.1)
CHLORIDE SERPL-SCNC: 108 MMOL/L (ref 97–108)
CO2 SERPL-SCNC: 28 MMOL/L (ref 21–32)
COMMENT, HOLDF: NORMAL
CREAT SERPL-MCNC: 0.86 MG/DL (ref 0.55–1.02)
ERYTHROCYTE [DISTWIDTH] IN BLOOD BY AUTOMATED COUNT: 13.6 % (ref 11.5–14.5)
GLUCOSE SERPL-MCNC: 108 MG/DL (ref 65–100)
HCT VFR BLD AUTO: 38 % (ref 35–47)
HGB BLD-MCNC: 12.4 G/DL (ref 11.5–16)
MCH RBC QN AUTO: 27.4 PG (ref 26–34)
MCHC RBC AUTO-ENTMCNC: 32.6 G/DL (ref 30–36.5)
MCV RBC AUTO: 83.9 FL (ref 80–99)
NRBC # BLD: 0 K/UL (ref 0–0.01)
NRBC BLD-RTO: 0 PER 100 WBC
PLATELET # BLD AUTO: 232 K/UL (ref 150–400)
PMV BLD AUTO: 8.7 FL (ref 8.9–12.9)
POTASSIUM SERPL-SCNC: 3.8 MMOL/L (ref 3.5–5.1)
RBC # BLD AUTO: 4.53 M/UL (ref 3.8–5.2)
SAMPLES BEING HELD,HOLD: NORMAL
SODIUM SERPL-SCNC: 142 MMOL/L (ref 136–145)
WBC # BLD AUTO: 7.5 K/UL (ref 3.6–11)

## 2019-06-23 PROCEDURE — 36415 COLL VENOUS BLD VENIPUNCTURE: CPT

## 2019-06-23 PROCEDURE — 85027 COMPLETE CBC AUTOMATED: CPT

## 2019-06-23 PROCEDURE — 99283 EMERGENCY DEPT VISIT LOW MDM: CPT

## 2019-06-23 PROCEDURE — 96361 HYDRATE IV INFUSION ADD-ON: CPT

## 2019-06-23 PROCEDURE — 74011250636 HC RX REV CODE- 250/636: Performed by: EMERGENCY MEDICINE

## 2019-06-23 PROCEDURE — 96374 THER/PROPH/DIAG INJ IV PUSH: CPT

## 2019-06-23 PROCEDURE — 80048 BASIC METABOLIC PNL TOTAL CA: CPT

## 2019-06-23 RX ORDER — ONDANSETRON 2 MG/ML
4 INJECTION INTRAMUSCULAR; INTRAVENOUS
Status: COMPLETED | OUTPATIENT
Start: 2019-06-23 | End: 2019-06-23

## 2019-06-23 RX ADMIN — SODIUM CHLORIDE 1000 ML: 900 INJECTION, SOLUTION INTRAVENOUS at 14:52

## 2019-06-23 RX ADMIN — ONDANSETRON 4 MG: 2 INJECTION INTRAMUSCULAR; INTRAVENOUS at 14:53

## 2019-06-23 NOTE — DISCHARGE INSTRUCTIONS
Patient Education        Vaginal Bleeding in Nonpregnant Women: Care Instructions  Your Care Instructions    Many women have bleeding or spotting between periods. Lots of things can cause it. You may bleed because of hormone problems, stress, or ovulation. Fibroids and IUDs (intrauterine devices) can also cause bleeding. If your bleeding or spotting is caused by one of these things and is not heavy or doesn't happen often, you probably don't need to worry. But in rare cases, infection, cancer, or other serious conditions can cause bleeding. So you may need more tests to find the cause of your bleeding. The doctor has checked you carefully, but problems can develop later. If you notice any problems or new symptoms, get medical treatment right away. Follow-up care is a key part of your treatment and safety. Be sure to make and go to all appointments, and call your doctor if you are having problems. It's also a good idea to know your test results and keep a list of the medicines you take. How can you care for yourself at home? · Take pain medicines exactly as directed. ? If the doctor gave you a prescription medicine for pain, take it as prescribed. ? If you are not taking a prescription pain medicine, ask your doctor if you can take an over-the-counter medicine. Do not take aspirin, which may make bleeding worse. · If your doctor prescribed birth control pills for your bleeding, take them as directed. · Eat foods that are high in iron and vitamin C. Foods high in iron include red meat, shellfish, eggs, beans, and leafy green vegetables. Foods high in vitamin C include citrus fruits, tomatoes, and broccoli. Ask your doctor if you need to take iron pills or a multivitamin. · Ask your doctor when it is okay to have sex. When should you call for help? Call 911 anytime you think you may need emergency care.  For example, call if:    · You passed out (lost consciousness).    Call your doctor now or seek immediate medical care if:    · You have severe vaginal bleeding.     · You are dizzy or lightheaded, or you feel like you may faint.     · You have new or worse belly or pelvic pain.    Watch closely for changes in your health, and be sure to contact your doctor if:    · Your bleeding gets worse.     · You think you might be pregnant.     · You do not get better as expected. Where can you learn more? Go to http://huma-elías.info/. Enter H772 in the search box to learn more about \"Vaginal Bleeding in Nonpregnant Women: Care Instructions. \"  Current as of: May 14, 2018  Content Version: 11.9  © 5500-0904 [employer]. Care instructions adapted under license by [employer] (which disclaims liability or warranty for this information). If you have questions about a medical condition or this instruction, always ask your healthcare professional. [address] any warranty or liability for your use of this information.

## 2019-06-23 NOTE — ED PROVIDER NOTES
I have evaluated the patient as the Provider in Triage. I have reviewed Her vital signs and the triage nurse assessment. I have talked with the patient and any available family and advised that I am the provider in triage and have ordered the appropriate study to initiate their work up based on the clinical presentation during my assessment. I have advised that the patient will be accommodated in the Main ED as soon as possible. I have also requested to contact the triage nurse or myself immediately if the patient experiences any changes in their condition during this brief waiting period. Note written by Harrison Bustamante, as dictated by Vanda Gonzalez DO 2:23 PM    Pt reports that she had a LEEP procedure performed on 6/12/19. She now complains of vaginal bleeding which started ~0900 this morning. Pt reports that the bleeding has been \"heavy\", but improving. She notes that she is \"passing clots\". Pt also complains of lightheadedness and a \"heavy\" feeling in her lower abdomen. She also notes that she has been experiencing intermittent fevers, chills, nausea, vomiting (x 2-3 in total) over the past 5 days. Pt denies current nausea. Denies diarrhea. Pt reports that she recently finished her menstrual period. She notes h/o PTSD and anxiety. This is a 53-year-old female who presents to the emergency room with complaint of vaginal bleeding. Patient states on 6/12/19 she had a LEEP procedure performed by her OB/GYN. Postoperatively she states that she had some heavy bleeding for the first few days turning into mild bleeding. Patient states yesterday she noticed some \"heaviness\" in her lower abdomen and this morning passed for blood clots. Patient states a large blood clot was about the size of a lemon followed by 3 golfball sized blood clots. Patient denies chest pain, denies shortness of breath. Denies any fever or chills.  Denies any nausea but does state multiple bouts of vomiting with the last this morning. Patient states she did have an episode of lightheadedness, called her on-call OB/GYN who told her to come to the emergency room for further evaluation of vaginal bleeding. Last menstrual period ended 6/12/19. There are no further complaints at this time. None  Past Medical History:  No date: Bipolar disorder ([address]  No date: Gestational diabetes  No date: Hypertension in pregnancy  01/2019: Ill-defined condition      Comment:  right shoulder injury/pain  No date: PTSD (post-traumatic stress disorder)  Past Surgical History:  No date: HX APPENDECTOMY  06/2019: HX LEEP PROCEDURE  No date: HX UROLOGICAL      Comment:  kidney stones      The history is provided by the patient. No  was used.         Past Medical History:   Diagnosis Date    [facility]     Gestational diabetes     Hypertension in pregnancy     Ill-defined condition 01/2019    right shoulder injury/pain    PTSD (post-traumatic stress disorder)        Past Surgical History:   Procedure Laterality Date    HX APPENDECTOMY      HX LEEP PROCEDURE  06/2019    HX UROLOGICAL      kidney stones         Family History:   Problem Relation Age of Onset    Stroke Mother     Hypertension Mother     Hypertension Father     MS Father        Social History     Socioeconomic History    Marital status: SINGLE     Spouse name: Not on file    Number of children: Not on file    Years of education: Not on file    Highest education level: Not on file   Occupational History    Not on file   Social Needs    Financial resource strain: Not on file    Food insecurity:     Worry: Not on file     Inability: Not on file    Transportation needs:     Medical: Not on file     Non-medical: Not on file   Tobacco Use    Smoking status: Never Smoker    Smokeless tobacco: Never Used   Substance and Sexual Activity    Alcohol use: Not Currently     Frequency: Never    Drug use: Never    Sexual activity: Yes   Lifestyle    Physical activity:     Days per week: Not on file     Minutes per session: Not on file    Stress: Not on file   Relationships    Social connections:     Talks on phone: Not on file     Gets together: Not on file     Attends Denominational service: Not on file     Active member of club or organization: Not on file     Attends meetings of clubs or organizations: Not on file     Relationship status: Not on file    Intimate partner violence:     Fear of current or ex partner: Not on file     Emotionally abused: Not on file     Physically abused: Not on file     Forced sexual activity: Not on file   Other Topics Concern    Not on file   Social History Narrative    Not on file         ALLERGIES: Latex and Iodine    Review of Systems   Constitutional: Negative for appetite change, chills, diaphoresis, fatigue and fever. HENT: Negative for congestion, ear discharge, ear pain, sinus pressure, sinus pain, sore throat and trouble swallowing. Eyes: Negative for photophobia, pain, redness and visual disturbance. Respiratory: Negative for chest tightness, shortness of breath and wheezing. Cardiovascular: Negative for chest pain and palpitations. Gastrointestinal: Positive for vomiting. Negative for abdominal distention, abdominal pain and nausea. Endocrine: Negative. Genitourinary: Positive for vaginal bleeding. Negative for difficulty urinating, flank pain, frequency and urgency. Musculoskeletal: Negative for back pain, neck pain and neck stiffness. Skin: Negative for color change, pallor, rash and wound. Allergic/Immunologic: Negative. Neurological: Positive for light-headedness. Negative for dizziness, speech difficulty, weakness and headaches. Hematological: Does not bruise/bleed easily. Psychiatric/Behavioral: Negative for behavioral problems. The patient is not nervous/anxious.         Vitals:    06/23/19 1423   BP: 134/71   Pulse: 99   Resp: 14   Temp: 98.6 °F (37 °C)   SpO2: 99%   Weight: 59 kg (130 lb)   Height: 5' 2\" (1.575 m)            Physical Exam   Constitutional: She is oriented to person, place, and time. She appears well-developed and well-nourished. No distress. HENT:   Head: Normocephalic and atraumatic. Right Ear: External ear normal.   Left Ear: External ear normal.   Nose: Nose normal.   Mouth/Throat: Oropharynx is clear and moist.   Eyes: Pupils are equal, round, and reactive to light. Conjunctivae and EOM are normal. Right eye exhibits no discharge. Left eye exhibits no discharge. Neck: Normal range of motion. Neck supple. No JVD present. No tracheal deviation present. Cardiovascular: Normal rate, regular rhythm, normal heart sounds and intact distal pulses. Exam reveals no gallop. No murmur heard. Pulmonary/Chest: Effort normal and breath sounds normal. No respiratory distress. She has no wheezes. She has no rales. She exhibits no tenderness. Abdominal: Soft. Bowel sounds are normal. She exhibits no distension. There is no tenderness. There is no rebound and no guarding. Genitourinary: Vaginal discharge (vaginal bleeding) found. Genitourinary Comments: negative   Musculoskeletal: Normal range of motion. She exhibits no edema or tenderness. Neurological: She is alert and oriented to person, place, and time. Skin: Skin is warm and dry. No rash noted. No erythema. No pallor. Psychiatric: She has a normal mood and affect. Her behavior is normal. Judgment and thought content normal.   Nursing note and vitals reviewed. MDM  Number of Diagnoses or Management Options  Vaginal bleeding: new and requires workup  Diagnosis management comments: Plan:  Discharge to home and follow up with PCP, follow up with OB/GYN. Return to the ED with worsening symptoms. Patient in agreement with plan of care.         Amount and/or Complexity of Data Reviewed  Clinical lab tests: ordered and reviewed  Discuss the patient with other providers: yes (Discussed with Dr. Luis Pack) Pelvic Exam  Date/Time: 6/23/2019 3:23 PM  Performed by: NP  Procedure duration:  1 minutes. Exam assisted by:  Jacinta Keys RN. Type of exam performed: bimanual and speculum. External genitalia appearance: normal.    Vaginal exam:  normal.    Cervical exam:  no cervical motion tenderness. Specimen(s) collected:  chlamydia and GC. Bimanual exam:  normal.    Patient tolerance: Patient tolerated the procedure well with no immediate complications          3:99 PM  Pt has been reexamined. Pt has no new complaints, changes or physical findings. Care plan outlined and precautions discussed. All available results were reviewed with pt. All medications were reviewed with pt. All of pt's questions and concerns were addressed. Pt agrees to F/U as instructed and agrees to return to ED upon further deterioration. Pt is ready to go home.   Mary Ann Pelaez NP

## 2019-06-23 NOTE — ED TRIAGE NOTES
LEEP procedure on 12th. Report started passing large clots this A.M with lower abd heaviness. Vomiting 2-3x a day for last 5 days.

## 2019-07-15 ENCOUNTER — OFFICE VISIT (OUTPATIENT)
Dept: FAMILY MEDICINE CLINIC | Age: 29
End: 2019-07-15

## 2019-07-15 VITALS
WEIGHT: 130 LBS | SYSTOLIC BLOOD PRESSURE: 113 MMHG | RESPIRATION RATE: 18 BRPM | DIASTOLIC BLOOD PRESSURE: 79 MMHG | HEIGHT: 62 IN | OXYGEN SATURATION: 99 % | TEMPERATURE: 98.5 F | HEART RATE: 79 BPM | BODY MASS INDEX: 23.92 KG/M2

## 2019-07-15 DIAGNOSIS — F32.A DEPRESSIVE DISORDER: ICD-10-CM

## 2019-07-15 DIAGNOSIS — F43.10 PTSD (POST-TRAUMATIC STRESS DISORDER): Primary | ICD-10-CM

## 2019-07-15 DIAGNOSIS — F41.1 GAD (GENERALIZED ANXIETY DISORDER): ICD-10-CM

## 2019-07-15 RX ORDER — CLONAZEPAM 0.5 MG/1
0.5 TABLET ORAL
Qty: 30 TAB | Refills: 2 | OUTPATIENT
Start: 2019-07-15 | End: 2020-01-05

## 2019-07-15 RX ORDER — DULOXETIN HYDROCHLORIDE 60 MG/1
60 CAPSULE, DELAYED RELEASE ORAL DAILY
Qty: 30 CAP | Refills: 1 | Status: SHIPPED | OUTPATIENT
Start: 2019-07-15 | End: 2020-06-16

## 2019-07-15 RX ORDER — DULOXETIN HYDROCHLORIDE 30 MG/1
30 CAPSULE, DELAYED RELEASE ORAL DAILY
Qty: 7 CAP | Refills: 0 | Status: SHIPPED | OUTPATIENT
Start: 2019-07-15 | End: 2020-06-16

## 2019-07-15 NOTE — PROGRESS NOTES
Chief Complaint   Patient presents with    Anxiety    Depression    Medication Evaluation     Patient in office today for med check. Pt was advised by therapist to discuss taking a daily medication for anxiety/depression,  Pt currently takes klonopin prn, pt states usage varies by week. 1. Have you been to the ER, urgent care clinic since your last visit? Hospitalized since your last visit? No    2. Have you seen or consulted any other health care providers outside of the [address] [place] [address] since your last visit? Include any pap smears or colon screening.  No

## 2019-07-15 NOTE — PATIENT INSTRUCTIONS
Duloxetine (By mouth) Duloxetine (doo-LOX-[id number]-teen) Treats depression, anxiety, diabetic peripheral neuropathy, fibromyalgia, and chronic muscle or bone pain. This medicine is an SSNRI. Brand Name(s): Cymbalta, DermacinRx DPN Paula Zepeda There may be other brand names for this medicine. When This Medicine Should Not Be Used: This medicine is not right for everyone. Do not use it if you had an allergic reaction to duloxetine. How to Use This Medicine:  
Capsule, Delayed Release Capsule · Take your medicine as directed. Your dose may need to be changed several times to find what works best for you. · Delayed-release capsule: Swallow the capsule whole. Do not crush, chew, break, or open it. · This medicine should come with a Medication Guide. Ask your pharmacist for a copy if you do not have one. · Missed dose: Take a dose as soon as you remember. If it is almost time for your next dose, wait until then and take a regular dose. Do not take extra medicine to make up for a missed dose. · Store the medicine in a closed container at room temperature, away from heat, moisture, and direct light. Drugs and Foods to Avoid: Ask your doctor or pharmacist before using any other medicine, including over-the-counter medicines, vitamins, and herbal products. · Do not take duloxetine if you have used an MAO inhibitor (MAOI) within the past 14 days. Do not start taking an MAO inhibitor within 5 days of stopping duloxetine. · Some medicines can affect how duloxetine works. Tell your doctor if you are using any of the following: 
¨ Buspirone, cimetidine, ciprofloxacin, enoxacin, fentanyl, lithium, Maria Luz's wort, theophylline, tramadol, tryptophan, or warfarin ¨ Amphetamines ¨ Blood pressure medicine ¨ Diuretic (water pill) ¨ Medicine for heart rhythm problems (including flecainide, propafenone, quinidine) ¨ Medicine to treat migraine headaches (including triptans) ¨ NSAID pain or arthritis medicine (including aspirin, celecoxib, diclofenac, ibuprofen, naproxen) ¨ Other medicine to treat depression or mood disorders (including amitriptyline, desipramine, fluoxetine, imipramine, nortriptyline, paroxetine) ¨ Phenothiazine medicine (including thioridazine) · Tell your doctor if you use anything else that makes you sleepy. Some examples are allergy medicine, narcotic pain medicine, and alcohol. · Do not drink alcohol while you are using this medicine. Warnings While Using This Medicine: · Tell your doctor if you are pregnant or breastfeeding, or if you have kidney disease, liver disease, diabetes, digestion problems, glaucoma, heart disease, high or low blood pressure, or problems with urination. Tell your doctor if you smoke or you have a history of seizures, or drug or alcohol addiction. · This medicine may cause the following problems:  
¨ Serious liver problems ¨ Serotonin syndrome (more likely when used with certain other medicines) ¨ Increased risk of bleeding problems ¨ Serious skin reactions ¨ Low sodium levels in the blood · This medicine can increase thoughts of suicide. Tell your doctor right away if you start to feel depressed and have thoughts about hurting yourself. · This medicine can cause changes in your blood pressure. This may make you dizzy or drowsy. Do not drive or do anything that could be dangerous until you know how this medicine affects you. Stand up slowly to avoid falls. · Do not stop using this medicine suddenly. Your doctor will need to slowly decrease your dose before you stop it completely. · Your doctor will check your progress and the effects of this medicine at regular visits. Keep all appointments. · Keep all medicine out of the reach of children. Never share your medicine with anyone. Possible Side Effects While Using This Medicine:  
Call your doctor right away if you notice any of these side effects: · Allergic reaction: Itching or hives, swelling in your face or hands, swelling or tingling in your mouth or throat, chest tightness, trouble breathing · Anxiety, restlessness, fever, fast heartbeat, sweating, muscle spasms, diarrhea, seeing or hearing things that are not there · Blistering, peeling, red skin rash · Confusion, weakness, muscle twitching · Dark urine or pale stools, nausea, vomiting, loss of appetite, stomach pain, yellow skin or eyes · Decrease in how much or how often you urinate · Eye pain, vision changes, seeing halos around lights · Feeling more energetic than usual 
· Lightheadedness, dizziness, or fainting · Unusual moods or behaviors, worsening depression, thoughts about hurting yourself, trouble sleeping · Unusual bleeding or bruising If you notice these less serious side effects, talk with your doctor: · Decrease in appetite or weight · Dry mouth, constipation, mild nausea · Unusual drowsiness, sleepiness, or tiredness If you notice other side effects that you think are caused by this medicine, tell your doctor. Call your doctor for medical advice about side effects. You may report side effects to FDA at 6-201-FDA-8909 © 2017 [facility] Information is for End User's use only and may not be sold, redistributed or otherwise used for commercial purposes. The above information is an  only. It is not intended as medical advice for individual conditions or treatments. Talk to your doctor, nurse or pharmacist before following any medical regimen to see if it is safe and effective for you.

## 2019-07-15 NOTE — PROGRESS NOTES
Chief Complaint   Patient presents with    Anxiety    Depression    Medication Evaluation     Patient in office today for med check. Pt was advised by therapist to discuss taking a daily medication for anxiety/depression,  Pt currently takes klonopin prn, pt states usage varies by week. Pt has been working with a therapist.   Effecting her concentration and focus. Constantly worrying. Feels like a generalized body pain. Pt reports having frequent anxiety attacks. Denies any SI. Reports that she recently lost her job due to sx. Currently looking for new work. Denies any other concerns at this time. Chief Complaint   Patient presents with    Anxiety    Depression    Medication Evaluation     she is a 26y.o. year old female who presents for evalution. Reviewed PmHx, RxHx, FmHx, SocHx, AllgHx and updated and dated in the chart. Review of Systems - negative except as listed above in the HPI    Objective:     Vitals:    07/15/19 0938   BP: 113/79   Pulse: 79   Resp: 18   Temp: 98.5 °F (36.9 °C)   TempSrc: Oral   SpO2: 99%   Weight: 130 lb (59 kg)   Height: 5' 2\" (1.575 m)     Physical Examination: General appearance - alert, well appearing, and in no distress  Mental status - depressed mood, anxious  Chest - clear to auscultation, no wheezes, rales or rhonchi, symmetric air entry  Heart - normal rate, regular rhythm, normal S1, S2, no murmurs    Assessment/ Plan:   Diagnoses and all orders for this visit:    1. PTSD (post-traumatic stress disorder) / 2. Depressive disorder  -     DULoxetine (CYMBALTA) 30 mg capsule; Take 1 Cap by mouth daily. Starting dose days 1-7.  -     DULoxetine (CYMBALTA) 60 mg capsule; Take 1 Cap by mouth daily. Maintenance dose. Start cymbalta daily as prescribed. Reviewed SEs/ADRs of medication. Enc pt to follow up if sx persist or worsen or if medication SEs intolerable to discuss alt treatment options.    3. DEANDRA (generalized anxiety disorder)  -     DULoxetine (CYMBALTA) 30 mg capsule; Take 1 Cap by mouth daily. Starting dose days 1-7.  -     DULoxetine (CYMBALTA) 60 mg capsule; Take 1 Cap by mouth daily. Maintenance dose. -     clonazePAM (KLONOPIN) 0.5 mg tablet; Take 1 Tab by mouth daily as needed (anixety). Continue klonopin sparingly as needed for acute anxiety sx. Reinforced SEs/ADRs of medication and how to take responsibly. Follow-up and Dispositions    · Return if symptoms worsen or fail to improve. I have discussed the diagnosis with the patient and the intended plan as seen in the above orders. The patient has received an after-visit summary and questions were answered concerning future plans. Medication Side Effects and Warnings were discussed with patient: yes  Patient Labs were reviewed and or requested: no  Patient Past Records were reviewed and or requested  yes  Patient / Caregiver Understanding of treatment plan was verbalized during office visit YES    SHANTHI Schilling    Patient Instructions   Duloxetine (By mouth)   Duloxetine (doo-LOX-[id number]-teen)  Treats depression, anxiety, diabetic peripheral neuropathy, fibromyalgia, and chronic muscle or bone pain. This medicine is an SSNRI. Brand Name(s): Cymbalta, DermacinRx DPN Eduardo, Irenka   There may be other brand names for this medicine. When This Medicine Should Not Be Used: This medicine is not right for everyone. Do not use it if you had an allergic reaction to duloxetine. How to Use This Medicine:   Capsule, Delayed Release Capsule  · Take your medicine as directed. Your dose may need to be changed several times to find what works best for you. · Delayed-release capsule: Swallow the capsule whole. Do not crush, chew, break, or open it. · This medicine should come with a Medication Guide. Ask your pharmacist for a copy if you do not have one. · Missed dose: Take a dose as soon as you remember. If it is almost time for your next dose, wait until then and take a regular dose.  Do not take extra medicine to make up for a missed dose. · Store the medicine in a closed container at room temperature, away from heat, moisture, and direct light. Drugs and Foods to Avoid:   Ask your doctor or pharmacist before using any other medicine, including over-the-counter medicines, vitamins, and herbal products. · Do not take duloxetine if you have used an MAO inhibitor (MAOI) within the past 14 days. Do not start taking an MAO inhibitor within 5 days of stopping duloxetine. · Some medicines can affect how duloxetine works. Tell your doctor if you are using any of the following:  ¨ Buspirone, cimetidine, ciprofloxacin, enoxacin, fentanyl, lithium, Maria Luz's wort, theophylline, tramadol, tryptophan, or warfarin  ¨ Amphetamines  ¨ Blood pressure medicine  ¨ Diuretic (water pill)  ¨ Medicine for heart rhythm problems (including flecainide, propafenone, quinidine)  ¨ Medicine to treat migraine headaches (including triptans)  ¨ NSAID pain or arthritis medicine (including aspirin, celecoxib, diclofenac, ibuprofen, naproxen)  ¨ Other medicine to treat depression or mood disorders (including amitriptyline, desipramine, fluoxetine, imipramine, nortriptyline, paroxetine)  ¨ Phenothiazine medicine (including thioridazine)  · Tell your doctor if you use anything else that makes you sleepy. Some examples are allergy medicine, narcotic pain medicine, and alcohol. · Do not drink alcohol while you are using this medicine. Warnings While Using This Medicine:   · Tell your doctor if you are pregnant or breastfeeding, or if you have kidney disease, liver disease, diabetes, digestion problems, glaucoma, heart disease, high or low blood pressure, or problems with urination. Tell your doctor if you smoke or you have a history of seizures, or drug or alcohol addiction.   · This medicine may cause the following problems:   ¨ Serious liver problems  ¨ Serotonin syndrome (more likely when used with certain other medicines)  ¨ Increased risk of bleeding problems  ¨ Serious skin reactions  ¨ Low sodium levels in the blood  · This medicine can increase thoughts of suicide. Tell your doctor right away if you start to feel depressed and have thoughts about hurting yourself. · This medicine can cause changes in your blood pressure. This may make you dizzy or drowsy. Do not drive or do anything that could be dangerous until you know how this medicine affects you. Stand up slowly to avoid falls. · Do not stop using this medicine suddenly. Your doctor will need to slowly decrease your dose before you stop it completely. · Your doctor will check your progress and the effects of this medicine at regular visits. Keep all appointments. · Keep all medicine out of the reach of children. Never share your medicine with anyone.   Possible Side Effects While Using This Medicine:   Call your doctor right away if you notice any of these side effects:  · Allergic reaction: Itching or hives, swelling in your face or hands, swelling or tingling in your mouth or throat, chest tightness, trouble breathing  · Anxiety, restlessness, fever, fast heartbeat, sweating, muscle spasms, diarrhea, seeing or hearing things that are not there  · Blistering, peeling, red skin rash  · Confusion, weakness, muscle twitching  · Dark urine or pale stools, nausea, vomiting, loss of appetite, stomach pain, yellow skin or eyes  · Decrease in how much or how often you urinate  · Eye pain, vision changes, seeing halos around lights  · Feeling more energetic than usual  · Lightheadedness, dizziness, or fainting  · Unusual moods or behaviors, worsening depression, thoughts about hurting yourself, trouble sleeping  · Unusual bleeding or bruising  If you notice these less serious side effects, talk with your doctor:   · Decrease in appetite or weight  · Dry mouth, constipation, mild nausea  · Unusual drowsiness, sleepiness, or tiredness  If you notice other side effects that you think are caused by this medicine, tell your doctor. Call your doctor for medical advice about side effects. You may report side effects to FDA at 3-151-NPB-6768  © 2017 [facility] [place] Information is for End User's use only and may not be sold, redistributed or otherwise used for commercial purposes. The above information is an  only. It is not intended as medical advice for individual conditions or treatments. Talk to your doctor, nurse or pharmacist before following any medical regimen to see if it is safe and effective for you.

## 2019-12-08 ENCOUNTER — HOSPITAL ENCOUNTER (EMERGENCY)
Age: 29
Discharge: HOME | End: 2019-12-08
Attending: EMERGENCY MEDICINE
Payer: MEDICAID

## 2019-12-08 ENCOUNTER — APPOINTMENT (OUTPATIENT)
Dept: ULTRASOUND IMAGING | Age: 29
End: 2019-12-08
Attending: EMERGENCY MEDICINE
Payer: MEDICAID

## 2019-12-08 VITALS
HEART RATE: 90 BPM | DIASTOLIC BLOOD PRESSURE: 88 MMHG | BODY MASS INDEX: 27.19 KG/M2 | SYSTOLIC BLOOD PRESSURE: 123 MMHG | OXYGEN SATURATION: 100 % | TEMPERATURE: 98.5 F | RESPIRATION RATE: 16 BRPM | WEIGHT: 144 LBS | HEIGHT: 61 IN

## 2019-12-08 DIAGNOSIS — O41.8X10 SUBCHORIONIC HEMORRHAGE OF PLACENTA IN FIRST TRIMESTER, SINGLE OR UNSPECIFIED FETUS: ICD-10-CM

## 2019-12-08 DIAGNOSIS — O20.9 VAGINAL BLEEDING AFFECTING EARLY PREGNANCY: Primary | ICD-10-CM

## 2019-12-08 DIAGNOSIS — O46.8X1 SUBCHORIONIC HEMORRHAGE OF PLACENTA IN FIRST TRIMESTER, SINGLE OR UNSPECIFIED FETUS: ICD-10-CM

## 2019-12-08 LAB
ANION GAP SERPL CALC-SCNC: 9 MMOL/L (ref 5–15)
APPEARANCE UR: ABNORMAL
BACTERIA URNS QL MICRO: ABNORMAL /HPF
BASOPHILS # BLD: 0 K/UL (ref 0–0.1)
BASOPHILS NFR BLD: 0 % (ref 0–1)
BILIRUB UR QL: NEGATIVE
BUN SERPL-MCNC: 7 MG/DL (ref 6–20)
BUN/CREAT SERPL: 12 (ref 12–20)
CALCIUM SERPL-MCNC: 8.5 MG/DL (ref 8.5–10.1)
CAOX CRY URNS QL MICRO: ABNORMAL
CHLORIDE SERPL-SCNC: 107 MMOL/L (ref 97–108)
CO2 SERPL-SCNC: 24 MMOL/L (ref 21–32)
COLOR UR: ABNORMAL
COMMENT, HOLDF: NORMAL
CREAT SERPL-MCNC: 0.57 MG/DL (ref 0.55–1.02)
DIFFERENTIAL METHOD BLD: NORMAL
EOSINOPHIL # BLD: 0.2 K/UL (ref 0–0.4)
EOSINOPHIL NFR BLD: 3 % (ref 0–7)
EPITH CASTS URNS QL MICRO: ABNORMAL /LPF
ERYTHROCYTE [DISTWIDTH] IN BLOOD BY AUTOMATED COUNT: 13.4 % (ref 11.5–14.5)
GLUCOSE SERPL-MCNC: 106 MG/DL (ref 65–100)
GLUCOSE UR STRIP.AUTO-MCNC: NEGATIVE MG/DL
HCG SERPL-ACNC: ABNORMAL MIU/ML (ref 0–6)
HCG UR QL: POSITIVE
HCT VFR BLD AUTO: 35.5 % (ref 35–47)
HGB BLD-MCNC: 12 G/DL (ref 11.5–16)
HGB UR QL STRIP: NEGATIVE
IMM GRANULOCYTES # BLD AUTO: 0 K/UL (ref 0–0.04)
IMM GRANULOCYTES NFR BLD AUTO: 0 % (ref 0–0.5)
KETONES UR QL STRIP.AUTO: NEGATIVE MG/DL
LEUKOCYTE ESTERASE UR QL STRIP.AUTO: NEGATIVE
LYMPHOCYTES # BLD: 1.8 K/UL (ref 0.8–3.5)
LYMPHOCYTES NFR BLD: 26 % (ref 12–49)
MCH RBC QN AUTO: 28 PG (ref 26–34)
MCHC RBC AUTO-ENTMCNC: 33.8 G/DL (ref 30–36.5)
MCV RBC AUTO: 82.9 FL (ref 80–99)
MONOCYTES # BLD: 0.5 K/UL (ref 0–1)
MONOCYTES NFR BLD: 7 % (ref 5–13)
NEUTS SEG # BLD: 4.5 K/UL (ref 1.8–8)
NEUTS SEG NFR BLD: 64 % (ref 32–75)
NITRITE UR QL STRIP.AUTO: NEGATIVE
NRBC # BLD: 0 K/UL (ref 0–0.01)
NRBC BLD-RTO: 0 PER 100 WBC
PH UR STRIP: 6 [PH] (ref 5–8)
PLATELET # BLD AUTO: 242 K/UL (ref 150–400)
PMV BLD AUTO: 9.2 FL (ref 8.9–12.9)
POTASSIUM SERPL-SCNC: 3.8 MMOL/L (ref 3.5–5.1)
PROT UR STRIP-MCNC: NEGATIVE MG/DL
RBC # BLD AUTO: 4.28 M/UL (ref 3.8–5.2)
RBC #/AREA URNS HPF: ABNORMAL /HPF (ref 0–5)
SAMPLES BEING HELD,HOLD: NORMAL
SODIUM SERPL-SCNC: 140 MMOL/L (ref 136–145)
SP GR UR REFRACTOMETRY: 1.02 (ref 1–1.03)
UR CULT HOLD, URHOLD: NORMAL
UROBILINOGEN UR QL STRIP.AUTO: 1 EU/DL (ref 0.2–1)
WBC # BLD AUTO: 7.1 K/UL (ref 3.6–11)
WBC URNS QL MICRO: ABNORMAL /HPF (ref 0–4)

## 2019-12-08 PROCEDURE — 81001 URINALYSIS AUTO W/SCOPE: CPT

## 2019-12-08 PROCEDURE — 81025 URINE PREGNANCY TEST: CPT

## 2019-12-08 PROCEDURE — 36415 COLL VENOUS BLD VENIPUNCTURE: CPT

## 2019-12-08 PROCEDURE — 85025 COMPLETE CBC W/AUTO DIFF WBC: CPT

## 2019-12-08 PROCEDURE — 84702 CHORIONIC GONADOTROPIN TEST: CPT

## 2019-12-08 PROCEDURE — 86900 BLOOD TYPING SEROLOGIC ABO: CPT

## 2019-12-08 PROCEDURE — 99284 EMERGENCY DEPT VISIT MOD MDM: CPT

## 2019-12-08 PROCEDURE — 76801 OB US < 14 WKS SINGLE FETUS: CPT

## 2019-12-08 PROCEDURE — 74011250636 HC RX REV CODE- 250/636: Performed by: EMERGENCY MEDICINE

## 2019-12-08 PROCEDURE — 76817 TRANSVAGINAL US OBSTETRIC: CPT

## 2019-12-08 PROCEDURE — 96372 THER/PROPH/DIAG INJ SC/IM: CPT

## 2019-12-08 PROCEDURE — 80048 BASIC METABOLIC PNL TOTAL CA: CPT

## 2019-12-08 RX ORDER — SODIUM CHLORIDE 0.9 % (FLUSH) 0.9 %
5-40 SYRINGE (ML) INJECTION AS NEEDED
Status: DISCONTINUED | OUTPATIENT
Start: 2019-12-08 | End: 2019-12-09 | Stop reason: HOSPADM

## 2019-12-08 RX ORDER — SODIUM CHLORIDE 0.9 % (FLUSH) 0.9 %
5-40 SYRINGE (ML) INJECTION EVERY 8 HOURS
Status: DISCONTINUED | OUTPATIENT
Start: 2019-12-08 | End: 2019-12-09 | Stop reason: HOSPADM

## 2019-12-08 RX ADMIN — HUMAN RHO(D) IMMUNE GLOBULIN 0.3 MG: 300 INJECTION, SOLUTION INTRAMUSCULAR at 23:21

## 2019-12-08 RX ADMIN — SODIUM CHLORIDE 1000 ML: 900 INJECTION, SOLUTION INTRAVENOUS at 21:44

## 2019-12-08 RX ADMIN — Medication 10 ML: at 21:44

## 2019-12-09 LAB
ABO + RH BLD: NORMAL
BLD PROD TYP BPU: NORMAL
BLOOD BANK CMNT PATIENT-IMP: NORMAL
BPU ID: NORMAL
GA (WEEKS): 7 WK
STATUS OF UNIT,%ST: NORMAL
UNIT DIVISION, %UDIV: 0

## 2019-12-09 NOTE — ED PROVIDER NOTES
This is a 76-year-old female who comes emergency room with a chief complaint of vaginal bleeding. Patient states that she is currently 7 weeks pregnant. Patient is seen her OB for confirmation of pregnancy approximately 2 weeks ago. Patient states that she is a G 13 P3 with all the others being miscarriages. Patient states that she is had some spotting and cramping for the past few days. Patient states that this increased tonight. Patient denies any fever or chills. The history is provided by the patient. No  was used. Abdominal Cramping    This is a new problem. The current episode started more than 2 days ago. The problem occurs constantly. The problem has been gradually worsening. The pain is associated with vomiting. The pain is located in the suprapubic region. The quality of the pain is aching and cramping. The pain is at a severity of 5/10. The pain is moderate. Associated symptoms include nausea and vomiting. Pertinent negatives include no fever, no diarrhea, no constipation, no dysuria, no hematuria, no myalgias, no chest pain and no back pain. Nothing worsens the pain. The pain is relieved by nothing.         Past Medical History:   Diagnosis Date    Bipolar disorder ([facility] [address]     Gestational diabetes     Hypertension in pregnancy     Ill-defined condition 01/2019    right shoulder injury/pain    PTSD (post-traumatic stress disorder)        Past Surgical History:   Procedure Laterality Date    HX APPENDECTOMY      HX LEEP PROCEDURE  06/2019    HX UROLOGICAL      kidney stones         Family History:   Problem Relation Age of Onset    Stroke Mother     Hypertension Mother     Hypertension Father     MS Father        Social History     Socioeconomic History    Marital status: SINGLE     Spouse name: Not on file    Number of children: Not on file    Years of education: Not on file    Highest education level: Not on file   Occupational History    Not on file   Social Needs    Financial resource strain: Not on file    Food insecurity:     Worry: Not on file     Inability: Not on file    Transportation needs:     Medical: Not on file     Non-medical: Not on file   Tobacco Use    Smoking status: Never Smoker    Smokeless tobacco: Never Used   Substance and Sexual Activity    Alcohol use: Not Currently     Frequency: Never    Drug use: Never    Sexual activity: Yes   Lifestyle    Physical activity:     Days per week: Not on file     Minutes per session: Not on file    Stress: Not on file   Relationships    Social connections:     Talks on phone: Not on file     Gets together: Not on file     Attends Tenriism service: Not on file     Active member of club or organization: Not on file     Attends meetings of clubs or organizations: Not on file     Relationship status: Not on file    Intimate partner violence:     Fear of current or ex partner: Not on file     Emotionally abused: Not on file     Physically abused: Not on file     Forced sexual activity: Not on file   Other Topics Concern    Not on file   Social History Narrative    Not on file     ALLERGIES: Latex and Iodine    Review of Systems   Constitutional: Negative for appetite change, chills, fever and unexpected weight change. HENT: Negative for ear pain, hearing loss, rhinorrhea and trouble swallowing. Eyes: Negative for pain and visual disturbance. Respiratory: Negative for cough, chest tightness and shortness of breath. Cardiovascular: Negative for chest pain and palpitations. Gastrointestinal: Positive for nausea and vomiting. Negative for abdominal distention, abdominal pain, blood in stool, constipation and diarrhea. Genitourinary: Positive for vaginal bleeding. Negative for dysuria, hematuria and urgency. Musculoskeletal: Negative for back pain and myalgias. Skin: Negative for rash. Neurological: Negative for dizziness, syncope, weakness and numbness.    Psychiatric/Behavioral: Negative for confusion and suicidal ideas. All other systems reviewed and are negative. Vitals:    12/08/19 2245 12/08/19 2300 12/08/19 2315 12/08/19 2330   BP: 128/90 (!) 127/91 123/88    Pulse:   90    Resp:   16    Temp:   98.5 °F (36.9 °C) 98.5 °F (36.9 °C)   SpO2: 100% 100% 100%    Weight:       Height:                Physical Exam  Vitals signs and nursing note reviewed. Constitutional:       General: She is not in acute distress. Appearance: She is well-developed. She is not diaphoretic. HENT:      Head: Normocephalic and atraumatic. Right Ear: External ear normal.      Left Ear: External ear normal.      Nose: Nose normal.      Mouth/Throat:      Pharynx: No oropharyngeal exudate. Eyes:      General: No scleral icterus. Right eye: No discharge. Left eye: No discharge. Conjunctiva/sclera: Conjunctivae normal.      Pupils: Pupils are equal, round, and reactive to light. Neck:      Musculoskeletal: Normal range of motion and neck supple. Vascular: No JVD. Trachea: No tracheal deviation. Cardiovascular:      Rate and Rhythm: Normal rate and regular rhythm. Heart sounds: Normal heart sounds. No murmur. No friction rub. No gallop. Pulmonary:      Effort: Pulmonary effort is normal. No respiratory distress. Breath sounds: Normal breath sounds. No stridor. No decreased breath sounds, wheezing, rhonchi or rales. Chest:      Chest wall: No tenderness. Abdominal:      General: Bowel sounds are normal. There is no distension. Palpations: Abdomen is soft. Tenderness: There is tenderness (Minimal) in the suprapubic area. There is no guarding or rebound. Musculoskeletal: Normal range of motion. General: No tenderness. Skin:     General: Skin is warm and dry. Capillary Refill: Capillary refill takes less than 2 seconds. Coloration: Skin is not pale. Findings: No erythema or rash.    Neurological:      Mental Status: She is alert and oriented to person, place, and time. GCS: GCS eye subscore is 4. GCS verbal subscore is 5. GCS motor subscore is 6. Cranial Nerves: No cranial nerve deficit. Sensory: No sensory deficit. Motor: No abnormal muscle tone. Coordination: Coordination normal.      Deep Tendon Reflexes: Reflexes are normal and symmetric. Reflexes normal.   Psychiatric:         Behavior: Behavior normal.         Thought Content: Thought content normal.         Judgment: Judgment normal.          MDM  Number of Diagnoses or Management Options  Subchorionic hemorrhage of placenta in first trimester, single or unspecified fetus:   Vaginal bleeding affecting early pregnancy:      Amount and/or Complexity of Data Reviewed  Clinical lab tests: ordered and reviewed  Tests in the radiology section of CPT®: ordered and reviewed    Risk of Complications, Morbidity, and/or Mortality  Presenting problems: moderate  Diagnostic procedures: moderate  Management options: moderate    Patient Progress  Patient progress: stable       Procedures    Chief Complaint   Patient presents with    Abdominal Cramping    Vaginal Bleeding       The patient's presenting problems have been discussed, and they are in agreement with the care plan formulated and outlined with them. I have encouraged them to ask questions as they arise throughout their visit. MEDICATIONS GIVEN:  Medications   sodium chloride 0.9 % bolus infusion 1,000 mL (0 mL IntraVENous IV Completed 12/8/19 2244)   rho D immune globulin (RHOGAM) 1,500 unit (300 mcg) injection 0.3 mg (0.3 mg IntraMUSCular Given 12/8/19 2321)       LABS REVIEWED:  Recent Results (from the past 24 hour(s))   SAMPLES BEING HELD    Collection Time: 12/08/19  9:41 PM   Result Value Ref Range    SAMPLES BEING HELD 1RD,1DRK GRN,1BL     COMMENT        Add-on orders for these samples will be processed based on acceptable specimen integrity and analyte stability, which may vary by analyte. BETA HCG, QT    Collection Time: 12/08/19  9:41 PM   Result Value Ref Range    Beta HCG, ,563 (H) 0 - 6 MIU/ML   CBC WITH AUTOMATED DIFF    Collection Time: 12/08/19  9:41 PM   Result Value Ref Range    WBC 7.1 3.6 - 11.0 K/uL    RBC 4.28 3.80 - 5.20 M/uL    HGB 12.0 11.5 - 16.0 g/dL    HCT 35.5 35.0 - 47.0 %    MCV 82.9 80.0 - 99.0 FL    MCH 28.0 26.0 - 34.0 PG    MCHC 33.8 30.0 - 36.5 g/dL    RDW 13.4 11.5 - 14.5 %    PLATELET 834 319 - 948 K/uL    MPV 9.2 8.9 - 12.9 FL    NRBC 0.0 0  WBC    ABSOLUTE NRBC 0.00 0.00 - 0.01 K/uL    NEUTROPHILS 64 32 - 75 %    LYMPHOCYTES 26 12 - 49 %    MONOCYTES 7 5 - 13 %    EOSINOPHILS 3 0 - 7 %    BASOPHILS 0 0 - 1 %    IMMATURE GRANULOCYTES 0 0.0 - 0.5 %    ABS. NEUTROPHILS 4.5 1.8 - 8.0 K/UL    ABS. LYMPHOCYTES 1.8 0.8 - 3.5 K/UL    ABS. MONOCYTES 0.5 0.0 - 1.0 K/UL    ABS. EOSINOPHILS 0.2 0.0 - 0.4 K/UL    ABS. BASOPHILS 0.0 0.0 - 0.1 K/UL    ABS. IMM.  GRANS. 0.0 0.00 - 0.04 K/UL    DF AUTOMATED     METABOLIC PANEL, BASIC    Collection Time: 12/08/19  9:41 PM   Result Value Ref Range    Sodium 140 136 - 145 mmol/L    Potassium 3.8 3.5 - 5.1 mmol/L    Chloride 107 97 - 108 mmol/L    CO2 24 21 - 32 mmol/L    Anion gap 9 5 - 15 mmol/L    Glucose 106 (H) 65 - 100 mg/dL    BUN 7 6 - 20 MG/DL    Creatinine 0.57 0.55 - 1.02 MG/DL    BUN/Creatinine ratio 12 12 - 20      GFR est AA >60 >60 ml/min/1.73m2    GFR est non-AA >60 >60 ml/min/1.73m2    Calcium 8.5 8.5 - 10.1 MG/DL   URINALYSIS W/MICROSCOPIC    Collection Time: 12/08/19  9:41 PM   Result Value Ref Range    Color YELLOW/STRAW      Appearance CLOUDY (A) CLEAR      Specific gravity 1.023 1.003 - 1.030      pH (UA) 6.0 5.0 - 8.0      Protein NEGATIVE  NEG mg/dL    Glucose NEGATIVE  NEG mg/dL    Ketone NEGATIVE  NEG mg/dL    Bilirubin NEGATIVE  NEG      Blood NEGATIVE  NEG      Urobilinogen 1.0 0.2 - 1.0 EU/dL    Nitrites NEGATIVE  NEG      Leukocyte Esterase NEGATIVE  NEG      WBC 5-10 0 - 4 /hpf    RBC 0-5 0 - 5 /hpf    Epithelial cells MODERATE (A) FEW /lpf    Bacteria 1+ (A) NEG /hpf    CA Oxalate crystals 3+ (A) NEG   URINE CULTURE HOLD SAMPLE    Collection Time: 12/08/19  9:41 PM   Result Value Ref Range    Urine culture hold        URINE ON HOLD IN MICROBIOLOGY DEPT FOR 3 DAYS. IF UNPRESERVED URINE IS SUBMITTED, IT CANNOT BE USED FOR ADDITIONAL TESTING AFTER 24 HRS, RECOLLECTION WILL BE REQUIRED. BLOOD TYPE, (ABO+RH)    Collection Time: 12/08/19  9:41 PM   Result Value Ref Range    ABO/Rh(D) A NEGATIVE     Comment SAMPLE NOT USABLE FOR CROSSMATCH    HCG URINE, QL. - POC    Collection Time: 12/08/19  9:53 PM   Result Value Ref Range    Pregnancy test,urine (POC) POSITIVE (A) NEG     RH IMMUNE GLOBULIN PROPHYLACTIC    Collection Time: 12/08/19 10:45 PM   Result Value Ref Range    No. of weeks gestation 7      Unit number [id number]     Blood component type RH IMMUNE GLOBULIN     Unit division 00     Status of unit ISSUED        VITAL SIGNS:  Patient Vitals for the past 24 hrs:   Temp Pulse Resp BP SpO2   12/08/19 2330 98.5 °F (36.9 °C)       12/08/19 2315 98.5 °F (36.9 °C) 90 16 123/88 100 %   12/08/19 2300    (!) 127/91 100 %   12/08/19 2245    128/90 100 %   12/08/19 2240    130/90 99 %   12/08/19 2149    (!) 143/95 99 %   12/08/19 2121 97.8 °F (36.6 °C) 94 16 140/84 99 %       RADIOLOGY RESULTS:  The following have been ordered and reviewed:  Us Uts Transvaginal Ob    Result Date: 12/8/2019  INDICATION: spotting/cramping, ~ 7 weeks preg COMPARISON: None FINDINGS: Realtime sonographic imaging of the pelvis was performed transabdominally. Intrauterine gestational sac: Present. Crown-rump length: 1.25 cm Estimated gestational age: 7 weeks, 2 days. Fetal heart rate: 153 beats per minute. Uterus: 9.1 x 7.0 x 6.0 cm. Suspect small amount of subchorionic hemorrhage, 1.7 x 0.7 cm. Right ovary: 2.1 x 1.7 x 1.4 cm with blood flow. Left ovary: 2.4 x 1.5 x 1.7 cm with blood flow.  Transvaginal sonography was performed to further evaluate the uterus and ovaries. Uterus: 9.9 x 7.3 x 5.9 cm. Right ovary 2.6 x 2.1 x 1.4 cm with blood flow. Left ovary 3.4 x 2.8 x 2.0 cm with blood flow. Small complex cystic lesion 1.7 x 1.9 x 1.9 cm. The gestation is too early to permit evaluation of the placental anatomic structure and assessment of qualitative amniotic fluid volume. Free fluid: None. IMPRESSION: Single viable intrauterine pregnancy with estimated gestational age of 9 weeks, 2 days. Suspect small subchorionic hemorrhage. Complex 1.9 cm left ovarian lesion could be a hemorrhagic cyst, close follow-up recommended. Recommend dedicated obstetric followup. Us Preg Uts < 14 Wks Sngl    Result Date: 12/8/2019  INDICATION: spotting/cramping, ~ 7 weeks preg COMPARISON: None FINDINGS: Realtime sonographic imaging of the pelvis was performed transabdominally. Intrauterine gestational sac: Present. Crown-rump length: 1.25 cm Estimated gestational age: 7 weeks, 2 days. Fetal heart rate: 153 beats per minute. Uterus: 9.1 x 7.0 x 6.0 cm. Suspect small amount of subchorionic hemorrhage, 1.7 x 0.7 cm. Right ovary: 2.1 x 1.7 x 1.4 cm with blood flow. Left ovary: 2.4 x 1.5 x 1.7 cm with blood flow. Transvaginal sonography was performed to further evaluate the uterus and ovaries. Uterus: 9.9 x 7.3 x 5.9 cm. Right ovary 2.6 x 2.1 x 1.4 cm with blood flow. Left ovary 3.4 x 2.8 x 2.0 cm with blood flow. Small complex cystic lesion 1.7 x 1.9 x 1.9 cm. The gestation is too early to permit evaluation of the placental anatomic structure and assessment of qualitative amniotic fluid volume. Free fluid: None. IMPRESSION: Single viable intrauterine pregnancy with estimated gestational age of 9 weeks, 2 days. Suspect small subchorionic hemorrhage. Complex 1.9 cm left ovarian lesion could be a hemorrhagic cyst, close follow-up recommended. Recommend dedicated obstetric followup.     PROGRESS NOTES:  Discussed results and plan with patient and significant other. Patient will be discharged home with OB follow up. Patient instructed to return to the emergency room for any worsening symptoms or any other concerns. DIAGNOSIS:    1. Vaginal bleeding affecting early pregnancy    2. Subchorionic hemorrhage of placenta in first trimester, single or unspecified fetus        PLAN:  Follow-up Information     Follow up With Specialties Details Why Contact Info    Raven Stern DO Obstetrics & Gynecology, Obstetrics, Gynecology Schedule an appointment as soon as possible for a visit  [phone]  [phone]      [facility] EMERGENCY DEPT Emergency Medicine  If symptoms worsen [address] [facility]  [phone]        Discharge Medication List as of 12/8/2019 10:49 PM      CONTINUE these medications which have NOT CHANGED    Details   !! DULoxetine (CYMBALTA) 30 mg capsule Take 1 Cap by mouth daily. Starting dose days 1-7., Normal, Disp-7 Cap, R-0      !! DULoxetine (CYMBALTA) 60 mg capsule Take 1 Cap by mouth daily. Maintenance dose., Normal, Disp-30 Cap, R-1      clonazePAM (KLONOPIN) 0.5 mg tablet Take 1 Tab by mouth daily as needed (anixety). , Print, Disp-30 Tab, R-2      cortisone acetate (CORTISONE IM) by IntraMUSCular route., Historical Med      FALMINA, 28, 0.1-20 mg-mcg tab Historical Med, R-3, ZACKARY      naproxen (NAPROSYN) 500 mg tablet Historical Med      acetaminophen (TYLENOL PO) Take  by mouth., Historical Med       !! - Potential duplicate medications found. Please discuss with provider. ED COURSE: The patient's hospital course has been uncomplicated.

## 2019-12-09 NOTE — ED TRIAGE NOTES
Pt ambulatory to triage, c/o cramping and spotting x \"a few days\". States just a small amount of spotting, hx of miscarriage. Blood bright red. Constant cramping.   7 weeks pregnant

## 2019-12-09 NOTE — DISCHARGE INSTRUCTIONS
We hope that we have addressed all of your medical concerns. The examination and treatment you received in the Emergency Department were for an emergent problem and were not intended as complete care. It is important that you follow up with your healthcare provider(s) for ongoing care. If your symptoms worsen or do not improve as expected, and you are unable to reach your usual health care provider(s), you should return to the Emergency Department. Today's healthcare is undergoing tremendous change, and patient satisfaction surveys are one of the many tools to assess the quality of medical care. You may receive a survey from the [employer] regarding your experience in the Emergency Department. I hope that your experience has been completely positive, particularly the medical care that I provided. As such, please participate in the survey; anything less than excellent does not meet my expectations or intentions. [facility] and [address] [place] [address] participate in nationally recognized quality of care measures. If your blood pressure is greater than 120/80, as reported below, we urge that you seek medical care to address the potential of high blood pressure, commonly known as hypertension. Hypertension can be hereditary or can be caused by certain medical conditions, pain, stress, or \"white coat syndrome. \"       Please make an appointment with your health care provider(s) for follow up of your Emergency Department visit. VITALS:   Patient Vitals for the past 8 hrs:   Temp Pulse Resp BP SpO2   12/08/19 2149 -- -- -- (!) 143/95 99 %   12/08/19 2121 97.8 °F (36.6 °C) 94 16 140/84 99 %          Thank you for allowing us to provide you with medical care today. We realize that you have many choices for your emergency care needs. Please choose us in the future for any continued health care needs. Marisol Narayanan, [address] [facility] Jean: [phone]            Recent Results (from the past 24 hour(s))   SAMPLES BEING HELD    Collection Time: 12/08/19  9:41 PM   Result Value Ref Range    SAMPLES BEING HELD 1RD,1DRK GRN,1BL     COMMENT        Add-on orders for these samples will be processed based on acceptable specimen integrity and analyte stability, which may vary by analyte. CBC WITH AUTOMATED DIFF    Collection Time: 12/08/19  9:41 PM   Result Value Ref Range    WBC 7.1 3.6 - 11.0 K/uL    RBC 4.28 3.80 - 5.20 M/uL    HGB 12.0 11.5 - 16.0 g/dL    HCT 35.5 35.0 - 47.0 %    MCV 82.9 80.0 - 99.0 FL    MCH 28.0 26.0 - 34.0 PG    MCHC 33.8 30.0 - 36.5 g/dL    RDW 13.4 11.5 - 14.5 %    PLATELET 752 019 - 885 K/uL    MPV 9.2 8.9 - 12.9 FL    NRBC 0.0 0  WBC    ABSOLUTE NRBC 0.00 0.00 - 0.01 K/uL    NEUTROPHILS 64 32 - 75 %    LYMPHOCYTES 26 12 - 49 %    MONOCYTES 7 5 - 13 %    EOSINOPHILS 3 0 - 7 %    BASOPHILS 0 0 - 1 %    IMMATURE GRANULOCYTES 0 0.0 - 0.5 %    ABS. NEUTROPHILS 4.5 1.8 - 8.0 K/UL    ABS. LYMPHOCYTES 1.8 0.8 - 3.5 K/UL    ABS. MONOCYTES 0.5 0.0 - 1.0 K/UL    ABS. EOSINOPHILS 0.2 0.0 - 0.4 K/UL    ABS. BASOPHILS 0.0 0.0 - 0.1 K/UL    ABS. IMM.  GRANS. 0.0 0.00 - 0.04 K/UL    DF AUTOMATED     METABOLIC PANEL, BASIC    Collection Time: 12/08/19  9:41 PM   Result Value Ref Range    Sodium 140 136 - 145 mmol/L    Potassium 3.8 3.5 - 5.1 mmol/L    Chloride 107 97 - 108 mmol/L    CO2 24 21 - 32 mmol/L    Anion gap 9 5 - 15 mmol/L    Glucose 106 (H) 65 - 100 mg/dL    BUN 7 6 - 20 MG/DL    Creatinine 0.57 0.55 - 1.02 MG/DL    BUN/Creatinine ratio 12 12 - 20      GFR est AA >60 >60 ml/min/1.73m2    GFR est non-AA >60 >60 ml/min/1.73m2    Calcium 8.5 8.5 - 10.1 MG/DL   URINALYSIS W/MICROSCOPIC    Collection Time: 12/08/19  9:41 PM   Result Value Ref Range    Color YELLOW/STRAW      Appearance CLOUDY (A) CLEAR      Specific gravity 1.023 1.003 - 1.030      pH (UA) 6.0 5.0 - 8.0 Protein NEGATIVE  NEG mg/dL    Glucose NEGATIVE  NEG mg/dL    Ketone NEGATIVE  NEG mg/dL    Bilirubin NEGATIVE  NEG      Blood NEGATIVE  NEG      Urobilinogen 1.0 0.2 - 1.0 EU/dL    Nitrites NEGATIVE  NEG      Leukocyte Esterase NEGATIVE  NEG      WBC PENDING /hpf    RBC PENDING /hpf    Epithelial cells PENDING /lpf    Bacteria PENDING /hpf   URINE CULTURE HOLD SAMPLE    Collection Time: 12/08/19  9:41 PM   Result Value Ref Range    Urine culture hold        URINE ON HOLD IN MICROBIOLOGY DEPT FOR 3 DAYS. IF UNPRESERVED URINE IS SUBMITTED, IT CANNOT BE USED FOR ADDITIONAL TESTING AFTER 24 HRS, RECOLLECTION WILL BE REQUIRED. BLOOD TYPE, (ABO+RH)    Collection Time: 12/08/19  9:41 PM   Result Value Ref Range    ABO/Rh(D) A NEGATIVE     Comment SAMPLE NOT USABLE FOR CROSSMATCH    HCG URINE, QL. - POC    Collection Time: 12/08/19  9:53 PM   Result Value Ref Range    Pregnancy test,urine (POC) POSITIVE (A) NEG     RH IMMUNE GLOBULIN PROPHYLACTIC    Collection Time: 12/08/19 10:45 PM   Result Value Ref Range    No. of weeks gestation 7      Unit number [id number]     Blood component type RH IMMUNE GLOBULIN     Unit division 00     Status of unit ALLOCATED        Us Uts Transvaginal Ob    Result Date: 12/8/2019  INDICATION: spotting/cramping, ~ 7 weeks preg COMPARISON: None FINDINGS: Realtime sonographic imaging of the pelvis was performed transabdominally. Intrauterine gestational sac: Present. Crown-rump length: 1.25 cm Estimated gestational age: 7 weeks, 2 days. Fetal heart rate: 153 beats per minute. Uterus: 9.1 x 7.0 x 6.0 cm. Suspect small amount of subchorionic hemorrhage, 1.7 x 0.7 cm. Right ovary: 2.1 x 1.7 x 1.4 cm with blood flow. Left ovary: 2.4 x 1.5 x 1.7 cm with blood flow. Transvaginal sonography was performed to further evaluate the uterus and ovaries. Uterus: 9.9 x 7.3 x 5.9 cm. Right ovary 2.6 x 2.1 x 1.4 cm with blood flow. Left ovary 3.4 x 2.8 x 2.0 cm with blood flow.  Small complex cystic lesion 1.7 x 1.9 x 1.9 cm. The gestation is too early to permit evaluation of the placental anatomic structure and assessment of qualitative amniotic fluid volume. Free fluid: None. IMPRESSION: Single viable intrauterine pregnancy with estimated gestational age of 9 weeks, 2 days. Suspect small subchorionic hemorrhage. Complex 1.9 cm left ovarian lesion could be a hemorrhagic cyst, close follow-up recommended. Recommend dedicated obstetric followup. Us Preg Uts < 14 Wks Sngl    Result Date: 12/8/2019  INDICATION: spotting/cramping, ~ 7 weeks preg COMPARISON: None FINDINGS: Realtime sonographic imaging of the pelvis was performed transabdominally. Intrauterine gestational sac: Present. Crown-rump length: 1.25 cm Estimated gestational age: 7 weeks, 2 days. Fetal heart rate: 153 beats per minute. Uterus: 9.1 x 7.0 x 6.0 cm. Suspect small amount of subchorionic hemorrhage, 1.7 x 0.7 cm. Right ovary: 2.1 x 1.7 x 1.4 cm with blood flow. Left ovary: 2.4 x 1.5 x 1.7 cm with blood flow. Transvaginal sonography was performed to further evaluate the uterus and ovaries. Uterus: 9.9 x 7.3 x 5.9 cm. Right ovary 2.6 x 2.1 x 1.4 cm with blood flow. Left ovary 3.4 x 2.8 x 2.0 cm with blood flow. Small complex cystic lesion 1.7 x 1.9 x 1.9 cm. The gestation is too early to permit evaluation of the placental anatomic structure and assessment of qualitative amniotic fluid volume. Free fluid: None. IMPRESSION: Single viable intrauterine pregnancy with estimated gestational age of 9 weeks, 2 days. Suspect small subchorionic hemorrhage. Complex 1.9 cm left ovarian lesion could be a hemorrhagic cyst, close follow-up recommended. Recommend dedicated obstetric followup. Patient Education        Subchorionic Hematoma: Care Instructions  Your Care Instructions    A subchorionic hematoma or hemorrhage is bleeding under one of the membranes (chorion) that surrounds the embryo inside the uterus.  It is a common cause of bleeding in early pregnancy. The main symptom is vaginal bleeding. But some women don't have symptoms. They may find out they have a hematoma during an ultrasound test.  In most cases, the bleeding goes away on its own. Most women go on to have a healthy baby. But in some cases, the bleeding is a sign of a miscarriage or other problem with the pregnancy. Your doctor may want to do a follow-up ultrasound. Follow-up care is a key part of your treatment and safety. Be sure to make and go to all appointments, and call your doctor if you are having problems. It's also a good idea to know your test results and keep a list of the medicines you take. How can you care for yourself at home? · Keep track of any bleeding, and follow the guidelines for when to call your doctor. · Keep in mind that some bleeding during the first trimester or an abnormal finding on an ultrasound may:  ? Not cause any problems for you or the baby. ? Turn out to be something more serious. But if this happens, it's best to find out early. Then you and your doctor can manage any complications sooner rather than later. When should you call for help? Call 911 anytime you think you may need emergency care. For example, call if:    · You have sudden, severe pain in your belly or pelvis.     · You passed out (lost consciousness).     · You have severe vaginal bleeding.    Call your doctor now or seek immediate medical care if:    · You are dizzy or lightheaded, or you feel like you may faint.     · You have new or increased pain in your belly or pelvis.     · You have new or more vaginal bleeding.     · You have pain in the vaginal area.     · You have a fever.     · You think you may have passed tissue. Save any tissue that you pass.  Take it to your doctor's office as soon as you can.    Watch closely for changes in your health, and be sure to contact your doctor if:    · You have new or worse vaginal symptoms, such as pain, itching, or a discharge.     · You do not get better as expected. Where can you learn more? Go to http://huma-elías.info/. Anamaria Fournier in the search box to learn more about \"Subchorionic Hematoma: Care Instructions. \"  Current as of: May 29, 2019  Content Version: 12.2  © 5752-1554 [employer], [employer]. Care instructions adapted under license by [employer] (which disclaims liability or warranty for this information). If you have questions about a medical condition or this instruction, always ask your healthcare professional. [address] any warranty or liability for your use of this information.

## 2019-12-18 LAB
ANTIBODY SCREEN, EXTERNAL: POSITIVE
CHLAMYDIA, EXTERNAL: NEGATIVE
HBSAG, EXTERNAL: NEGATIVE
HCT, EXTERNAL: 35.2
HGB, EXTERNAL: 11.7
HIV, EXTERNAL: NEGATIVE
N. GONORRHEA, EXTERNAL: NEGATIVE
RPR, EXTERNAL: NORMAL
RUBELLA, EXTERNAL: NORMAL
TYPE, ABO & RH, EXTERNAL: NORMAL

## 2020-01-05 ENCOUNTER — HOSPITAL ENCOUNTER (EMERGENCY)
Age: 30
Discharge: HOME | End: 2020-01-05
Attending: EMERGENCY MEDICINE
Payer: MEDICAID

## 2020-01-05 VITALS
BODY MASS INDEX: 27.19 KG/M2 | WEIGHT: 144 LBS | HEIGHT: 61 IN | SYSTOLIC BLOOD PRESSURE: 126 MMHG | TEMPERATURE: 98.5 F | HEART RATE: 110 BPM | OXYGEN SATURATION: 99 % | DIASTOLIC BLOOD PRESSURE: 81 MMHG | RESPIRATION RATE: 18 BRPM

## 2020-01-05 DIAGNOSIS — N30.00 ACUTE CYSTITIS WITHOUT HEMATURIA: ICD-10-CM

## 2020-01-05 DIAGNOSIS — E86.0 DEHYDRATION: ICD-10-CM

## 2020-01-05 DIAGNOSIS — O21.0 HYPEREMESIS GRAVIDARUM: Primary | ICD-10-CM

## 2020-01-05 LAB
ALBUMIN SERPL-MCNC: 3.2 G/DL (ref 3.5–5)
ALBUMIN/GLOB SERPL: 0.8 {RATIO} (ref 1.1–2.2)
ALP SERPL-CCNC: 98 U/L (ref 45–117)
ALT SERPL-CCNC: 20 U/L (ref 12–78)
ANION GAP SERPL CALC-SCNC: 4 MMOL/L (ref 5–15)
APPEARANCE UR: ABNORMAL
AST SERPL-CCNC: 13 U/L (ref 15–37)
BACTERIA URNS QL MICRO: ABNORMAL /HPF
BASOPHILS # BLD: 0 K/UL (ref 0–0.1)
BASOPHILS NFR BLD: 0 % (ref 0–1)
BILIRUB SERPL-MCNC: 0.5 MG/DL (ref 0.2–1)
BILIRUB UR QL CFM: NEGATIVE
BUN SERPL-MCNC: 6 MG/DL (ref 6–20)
BUN/CREAT SERPL: 10 (ref 12–20)
CALCIUM SERPL-MCNC: 8.8 MG/DL (ref 8.5–10.1)
CHLORIDE SERPL-SCNC: 107 MMOL/L (ref 97–108)
CO2 SERPL-SCNC: 24 MMOL/L (ref 21–32)
COLOR UR: ABNORMAL
COMMENT, HOLDF: NORMAL
CREAT SERPL-MCNC: 0.6 MG/DL (ref 0.55–1.02)
DIFFERENTIAL METHOD BLD: ABNORMAL
EOSINOPHIL # BLD: 0.1 K/UL (ref 0–0.4)
EOSINOPHIL NFR BLD: 1 % (ref 0–7)
EPITH CASTS URNS QL MICRO: ABNORMAL /LPF
ERYTHROCYTE [DISTWIDTH] IN BLOOD BY AUTOMATED COUNT: 13.2 % (ref 11.5–14.5)
GLOBULIN SER CALC-MCNC: 4 G/DL (ref 2–4)
GLUCOSE SERPL-MCNC: 102 MG/DL (ref 65–100)
GLUCOSE UR STRIP.AUTO-MCNC: NEGATIVE MG/DL
HCT VFR BLD AUTO: 35.5 % (ref 35–47)
HGB BLD-MCNC: 12.1 G/DL (ref 11.5–16)
HGB UR QL STRIP: NEGATIVE
HYALINE CASTS URNS QL MICRO: ABNORMAL /LPF (ref 0–5)
IMM GRANULOCYTES # BLD AUTO: 0 K/UL (ref 0–0.04)
IMM GRANULOCYTES NFR BLD AUTO: 0 % (ref 0–0.5)
KETONES UR QL STRIP.AUTO: 80 MG/DL
LEUKOCYTE ESTERASE UR QL STRIP.AUTO: ABNORMAL
LYMPHOCYTES # BLD: 0.6 K/UL (ref 0.8–3.5)
LYMPHOCYTES NFR BLD: 6 % (ref 12–49)
MCH RBC QN AUTO: 27.9 PG (ref 26–34)
MCHC RBC AUTO-ENTMCNC: 34.1 G/DL (ref 30–36.5)
MCV RBC AUTO: 81.8 FL (ref 80–99)
MONOCYTES # BLD: 0.4 K/UL (ref 0–1)
MONOCYTES NFR BLD: 4 % (ref 5–13)
MUCOUS THREADS URNS QL MICRO: ABNORMAL /LPF
NEUTS SEG # BLD: 9.7 K/UL (ref 1.8–8)
NEUTS SEG NFR BLD: 89 % (ref 32–75)
NITRITE UR QL STRIP.AUTO: NEGATIVE
NRBC # BLD: 0 K/UL (ref 0–0.01)
NRBC BLD-RTO: 0 PER 100 WBC
PH UR STRIP: 6 [PH] (ref 5–8)
PLATELET # BLD AUTO: 219 K/UL (ref 150–400)
PMV BLD AUTO: 8.8 FL (ref 8.9–12.9)
POTASSIUM SERPL-SCNC: 3.7 MMOL/L (ref 3.5–5.1)
PROT SERPL-MCNC: 7.2 G/DL (ref 6.4–8.2)
PROT UR STRIP-MCNC: ABNORMAL MG/DL
RBC # BLD AUTO: 4.34 M/UL (ref 3.8–5.2)
RBC #/AREA URNS HPF: ABNORMAL /HPF (ref 0–5)
RBC MORPH BLD: ABNORMAL
SAMPLES BEING HELD,HOLD: NORMAL
SODIUM SERPL-SCNC: 135 MMOL/L (ref 136–145)
SP GR UR REFRACTOMETRY: 1.02 (ref 1–1.03)
UR CULT HOLD, URHOLD: NORMAL
UROBILINOGEN UR QL STRIP.AUTO: 2 EU/DL (ref 0.2–1)
WBC # BLD AUTO: 10.8 K/UL (ref 3.6–11)
WBC MORPH BLD: ABNORMAL
WBC URNS QL MICRO: ABNORMAL /HPF (ref 0–4)

## 2020-01-05 PROCEDURE — 87086 URINE CULTURE/COLONY COUNT: CPT

## 2020-01-05 PROCEDURE — 99283 EMERGENCY DEPT VISIT LOW MDM: CPT

## 2020-01-05 PROCEDURE — 80053 COMPREHEN METABOLIC PANEL: CPT

## 2020-01-05 PROCEDURE — 96361 HYDRATE IV INFUSION ADD-ON: CPT

## 2020-01-05 PROCEDURE — 85025 COMPLETE CBC W/AUTO DIFF WBC: CPT

## 2020-01-05 PROCEDURE — 74011250636 HC RX REV CODE- 250/636: Performed by: EMERGENCY MEDICINE

## 2020-01-05 PROCEDURE — 81001 URINALYSIS AUTO W/SCOPE: CPT

## 2020-01-05 PROCEDURE — 74011250636 HC RX REV CODE- 250/636: Performed by: PHYSICIAN ASSISTANT

## 2020-01-05 PROCEDURE — 96374 THER/PROPH/DIAG INJ IV PUSH: CPT

## 2020-01-05 PROCEDURE — 36415 COLL VENOUS BLD VENIPUNCTURE: CPT

## 2020-01-05 RX ORDER — ONDANSETRON 2 MG/ML
4 INJECTION INTRAMUSCULAR; INTRAVENOUS
Status: COMPLETED | OUTPATIENT
Start: 2020-01-05 | End: 2020-01-05

## 2020-01-05 RX ORDER — PROMETHAZINE HYDROCHLORIDE 25 MG/1
25 SUPPOSITORY RECTAL
Qty: 6 SUPPOSITORY | Refills: 0 | Status: SHIPPED | OUTPATIENT
Start: 2020-01-05 | End: 2020-06-16

## 2020-01-05 RX ORDER — NITROFURANTOIN 25; 75 MG/1; MG/1
100 CAPSULE ORAL 2 TIMES DAILY
Qty: 14 CAP | Refills: 0 | OUTPATIENT
Start: 2020-01-05 | End: 2020-01-12

## 2020-01-05 RX ADMIN — SODIUM CHLORIDE 1000 ML: 900 INJECTION, SOLUTION INTRAVENOUS at 16:06

## 2020-01-05 RX ADMIN — SODIUM CHLORIDE 1000 ML: 900 INJECTION, SOLUTION INTRAVENOUS at 13:38

## 2020-01-05 RX ADMIN — ONDANSETRON 4 MG: 2 INJECTION INTRAMUSCULAR; INTRAVENOUS at 13:43

## 2020-01-05 NOTE — ED TRIAGE NOTES
Pt 11 weeks pregnant and here for vomiting. Reports hx of hyperemesis with previous pregnancy. Take phergan at home but unable to keep down for last 3 days. Pt is pale and tachycardardic. Denies vaginal bleeding. Reports abd cramping.

## 2020-01-05 NOTE — ED NOTES
Dr. Shane Caba at chairside to review d/c instructions and prescription(s) with patient and self. Opportunity for clarification given. No further questions/concerns voiced at this time. Patient is alert and remains in no acute distress.

## 2020-01-05 NOTE — DISCHARGE INSTRUCTIONS
Patient Education        Dehydration: Care Instructions  Your Care Instructions  Dehydration happens when your body loses too much fluid. This might happen when you do not drink enough water or you lose large amounts of fluids from your body because of diarrhea, vomiting, or sweating. Severe dehydration can be life-threatening. Water and minerals called electrolytes help put your body fluids back in balance. Learn the early signs of fluid loss, and drink more fluids to prevent dehydration. Follow-up care is a key part of your treatment and safety. Be sure to make and go to all appointments, and call your doctor if you are having problems. It's also a good idea to know your test results and keep a list of the medicines you take. How can you care for yourself at home? · To prevent dehydration, drink plenty of fluids, enough so that your urine is light yellow or clear like water. Choose water and other caffeine-free clear liquids until you feel better. If you have kidney, heart, or liver disease and have to limit fluids, talk with your doctor before you increase the amount of fluids you drink. · If you do not feel like eating or drinking, try taking small sips of water, sports drinks, or other rehydration drinks. · Get plenty of rest.  To prevent dehydration  · Add more fluids to your diet and daily routine, unless your doctor has told you not to. · During hot weather, drink more fluids. Drink even more fluids if you exercise a lot. Stay away from drinks with alcohol or caffeine. · Watch for the symptoms of dehydration. These include:  ? A dry, sticky mouth. ? Dark yellow urine, and not much of it. ? Dry and sunken eyes. ? Feeling very tired. · Learn what problems can lead to dehydration. These include:  ? Diarrhea, fever, and vomiting. ? Any illness with a fever, such as pneumonia or the flu. ?  Activities that cause heavy sweating, such as endurance races and heavy outdoor work in hot or humid weather. ? Alcohol or drug use or problems caused by quitting their use (withdrawal). ? Certain medicines, such as cold and allergy pills (antihistamines), diet pills (diuretics), and laxatives. ? Certain diseases, such as diabetes, cancer, and heart or kidney disease. When should you call for help? Call 911 anytime you think you may need emergency care. For example, call if:    · You passed out (lost consciousness).    Call your doctor now or seek immediate medical care if:    · You are confused and cannot think clearly.     · You are dizzy or lightheaded, or you feel like you may faint.     · You have signs of needing more fluids. You have sunken eyes and a dry mouth, and you pass only a little dark urine.     · You cannot keep fluids down.    Watch closely for changes in your health, and be sure to contact your doctor if:    · You are not making tears.     · Your skin is very dry and sags slowly back into place after you pinch it.     · Your mouth and eyes are very dry. Where can you learn more? Go to http://huma-elías.info/. Enter M554 in the search box to learn more about \"Dehydration: Care Instructions. \"  Current as of: June 26, 2019  Content Version: 12.2  © 3802-7867 [employer]. Care instructions adapted under license by [employer] (which disclaims liability or warranty for this information). If you have questions about a medical condition or this instruction, always ask your healthcare professional. [place] [address] any warranty or liability for your use of this information. Patient Education        Managing Morning Sickness: Care Instructions  Your Care Instructions    For many women, the toughest part of early pregnancy is morning sickness. Morning sickness can range from mild nausea to severe nausea with bouts of vomiting. Symptoms may be worse in the morning, although they can strike at any time of the day or night.   If you have nausea, vomiting, or both, look for safe measures that can bring you relief. You can take simple steps at home to manage morning sickness. These steps include changing what and when you eat and avoiding certain foods and smells. Some women find that acupuncture and acupressure wristbands also help. Follow-up care is a key part of your treatment and safety. Be sure to make and go to all appointments, and call your doctor if you are having problems. It's also a good idea to know your test results and keep a list of the medicines you take. How can you care for yourself at home? · Keep food in your stomach, but not too much at once. Your nausea may be worse if your stomach is empty. Eat five or six small meals a day instead of three large meals. · For morning nausea, eat a small snack, such as a couple of crackers or dry biscuits, before rising. Allow a few minutes for your stomach to settle before you get out of bed slowly. · Drink plenty of fluids, enough so that your urine is light yellow or clear like water. If you have kidney, heart, or liver disease and have to limit fluids, talk with your doctor before you increase the amount of fluids you drink. Some women find that peppermint tea helps with nausea. · Eat more protein, such as chicken, fish, lean meat, beans, nuts, and seeds. · Eat carbohydrate foods, such as potatoes, whole-grain cereals, rice, and pasta. · Avoid smells and foods that make you feel nauseated. Spicy or high-fat foods, citrus juice, milk, coffee, and tea with caffeine often make nausea worse. · Do not drink alcohol. · Do not smoke. Try not to be around others who smoke. If you need help quitting, talk to your doctor about stop-smoking programs and medicines. These can increase your chances of quitting for good. · If you are taking iron supplements, ask your doctor if they are necessary. Iron can make nausea worse.   · Get lots of rest. Stress and fatigue can make your morning sickness worse. · Ask your doctor about taking prescription medicine, or over-the-counter products such as vitamin B6, doxylamine, or juana, to relieve your symptoms. Your doctor can tell you the doses that are safe for you. · Take your prenatal vitamins at night on a full stomach. When should you call for help? Call 911 anytime you think you may need emergency care. For example, call if:    · You passed out (lost consciousness).    Call your doctor now or seek immediate medical care if:    · You are sick to your stomach or cannot drink fluids.     · You have symptoms of dehydration, such as:  ? Dry eyes and a dry mouth. ? Passing only a little urine. ? Feeling thirstier than usual.     · You are not able to keep down your medicine.     · You have pain in your belly or pelvis.    Watch closely for changes in your health, and be sure to contact your doctor if:    · You do not get better as expected. Where can you learn more? Go to http://huma-elías.info/. Enter N113 in the search box to learn more about \"Managing Morning Sickness: Care Instructions. \"  Current as of: May 29, 2019  Content Version: 12.2  © 2659-7178 [employer]. Care instructions adapted under license by [employer] (which disclaims liability or warranty for this information). If you have questions about a medical condition or this instruction, always ask your healthcare professional. [place] [address] any warranty or liability for your use of this information. Patient Education        Extreme Nausea and Vomiting in Pregnancy: Care Instructions  Your Care Instructions    Nausea and vomiting (often called morning sickness) are common in pregnancy. They are caused by pregnancy hormones and happen most often in the first 3 months. Some women get very sick and are not able to keep down food and fluids. This extreme morning sickness is called hyperemesis gravidarum.  It can lead to a dangerous loss of fluids in the body. It also can keep you from gaining weight and getting proper nutrition during your pregnancy. Your body fluids are put back in balance with water and minerals called electrolytes. Medicine may help if you have severe nausea and vomiting. Follow-up care is a key part of your treatment and safety. Be sure to make and go to all appointments, and call your doctor if you are having problems. It's also a good idea to know your test results and keep a list of the medicines you take. How can you care for yourself at home? · Take your medicines exactly as prescribed. Call your doctor if you think you are having a problem with your medicine. · Drink plenty of fluids to prevent dehydration. Choose water and other caffeine-free clear liquids until you feel better. Try sipping on sports drinks that have salt and sugar in them. · Eat a small snack, such as crackers, before you get out of bed. Wait a few minutes, then get out of bed slowly. · Keep food in your stomach, but not too much at once. An empty stomach can make nausea worse. Eat several small meals every day instead of three large meals. · Eat more protein and less fat. · Get plenty of vitamin B6 by eating whole grains, nuts, seeds, and legumes. You can take vitamin B6 tablets if your doctor says it is okay. · Try to avoid smells and foods that make you feel sick to your stomach. · Get lots of rest.  · You may want to try acupressure bands. They put pressure on an acupressure point in the wrist. Some women feel better using the bands. · Olivia may also help you feel better. You can use it in tea, take it as a pill, or use a olivia syrup that you can buy at a health food store. When should you call for help? Call 911 anytime you think you may need emergency care.  For example, call if:    · You passed out (lost consciousness).    Call your doctor now or seek immediate medical care if:    · You are sick to your stomach or cannot drink fluids.     · You have symptoms of dehydration, such as:  ? Dry eyes and a dry mouth. ? Passing only a little urine. ? Feeling thirstier than usual.     · You are not able to keep down your medicines.     · You have pain in your belly or pelvis.    Watch closely for changes in your health, and be sure to contact your doctor if:    · You do not get better as expected. Where can you learn more? Go to http://huma-elías.info/. Enter D915 in the search box to learn more about \"Extreme Nausea and Vomiting in Pregnancy: Care Instructions. \"  Current as of: May 29, 2019  Content Version: 12.2  © 2084-8747 [employer]. Care instructions adapted under license by [employer] (which disclaims liability or warranty for this information). If you have questions about a medical condition or this instruction, always ask your healthcare professional. [place] [address] any warranty or liability for your use of this information. Patient Education        Urinary Tract Infection in Pregnancy: Care Instructions  Your Care Instructions    A urinary tract infection, or UTI, is an infection of the bladder and other urinary structures. Most UTIs occur in the bladder. They often cause pain or burning when you urinate. UTI is the most common bacterial infection in pregnancy. If untreated, a UTI could lead to problems such as a kidney infection or  labor. Most UTIs can be cured with antibiotics. Your doctor will prescribe an antibiotic that is safe during pregnancy. Be sure to finish your medicine so that the infection does not spread to your kidneys. Follow-up care is a key part of your treatment and safety. Be sure to make and go to all appointments, and call your doctor if you are having problems. It's also a good idea to know your test results and keep a list of the medicines you take. How can you care for yourself at home?   · Take your antibiotics as directed. Do not stop taking them just because you feel better. You need to take the full course of antibiotics. · Drink extra water and other fluids for the next day or two. This will help wash out the bacteria causing the infection. If you have kidney, heart, or liver disease and have to limit fluids, talk with your doctor before you increase the amount of fluids you drink. · Do not drink alcohol. · Urinate often. Try to empty your bladder each time. Preventing UTIs  · Drink plenty of fluids, enough so that your urine is light yellow or clear like water. This helps you urinate often, which clears bacteria from your system. If you have kidney, heart, or liver disease and have to limit fluids, talk with your doctor before you increase the amount of fluids you drink. · Urinate when you first have the urge. · Urinate right after you have sex. This is the best way for women to avoid UTIs. · When going to the bathroom, wipe from front to back to keep bacteria from entering the vagina or urethra. When should you call for help? Call your doctor now or seek immediate medical care if:    · You have symptoms of a worse urinary tract infection. These may include:  ? Pain or burning when you urinate. ? A frequent need to urinate without being able to pass much urine. ? Pain in the flank, which is just below the rib cage and above the waist on either side of the back. ? Blood in your urine. ? A fever.    Watch closely for changes in your health, and be sure to contact your doctor if:    · You do not get better as expected. Where can you learn more? Go to http://huma-elías.info/. Enter M982 in the search box to learn more about \"Urinary Tract Infection in Pregnancy: Care Instructions. \"  Current as of: May 29, 2019  Content Version: 12.2  © 8560-7972 [employer].  Care instructions adapted under license by [employer] (which disclaims liability or warranty for this information). If you have questions about a medical condition or this instruction, always ask your healthcare professional. [place] [address] any warranty or liability for your use of this information.

## 2020-01-05 NOTE — ED PROVIDER NOTES
26 y.o. [id number] female with past medical history significant for bipolar 1 disorder, HTN, diabetes who presents from home via private vehicle with chief complaint of vomiting. Pt is 11 weeks pregnant. She reports frequent vomiting for the past 3 days that does not seem to be improving despite taking phenergan. She notes having hyperemesis with ehr previous pregnancy. She denies any blood in the emesis or diarrhea. 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 Denies vaginal bleeding and dysuria. He does report some lower abdominal cramping. She reports already having ultrasound with Dr. Estela Heredia confirming IUP. There are no other acute medical concerns at this time.     Social hx: Never tobacco smoker; Denies EtOH use; Denies illicit drug use  PCP: Felizardo Closs, NP  Ob/Gyn: Lauri Smith DO     Note written by Harrison Valderrama, as dictated by Katie Gutierres MD 3:30 PM           Past Medical History:   Diagnosis Date    Bipolar disorder ([address]     Gestational diabetes     Hypertension in pregnancy     Ill-defined condition 01/2019    right shoulder injury/pain    PTSD (post-traumatic stress disorder)        Past Surgical History:   Procedure Laterality Date    HX APPENDECTOMY      HX LEEP PROCEDURE  06/2019    HX UROLOGICAL      kidney stones         Family History:   Problem Relation Age of Onset    Stroke Mother     Hypertension Mother     Hypertension Father     MS Father        Social History     Socioeconomic History    Marital status: SINGLE     Spouse name: Not on file    Number of children: Not on file    Years of education: Not on file    Highest education level: Not on file   Occupational History    Not on file   Social Needs    Financial resource strain: Not on file    Food insecurity:     Worry: Not on file     Inability: Not on file    Transportation needs:     Medical: Not on file     Non-medical: Not on file   Tobacco Use    Smoking status: Never Smoker    Smokeless tobacco: Never Used   Substance and Sexual Activity    Alcohol use: Not Currently     Frequency: Never    Drug use: Never    Sexual activity: Yes   Lifestyle    Physical activity:     Days per week: Not on file     Minutes per session: Not on file    Stress: Not on file   Relationships    Social connections:     Talks on phone: Not on file     Gets together: Not on file     Attends [employer] service: Not on file     Active member of club or organization: Not on file     Attends meetings of clubs or organizations: Not on file     Relationship status: Not on file    Intimate partner violence:     Fear of current or ex partner: Not on file     Emotionally abused: Not on file     Physically abused: Not on file     Forced sexual activity: Not on file   Other Topics Concern    Not on file   Social History Narrative    Not on file         ALLERGIES: Latex and Iodine    Review of Systems   Gastrointestinal: Positive for vomiting. Genitourinary: Negative for dysuria and vaginal bleeding. All other systems reviewed and are negative. Vitals:    01/05/20 1335   BP: 118/77   Pulse: (!) 110   Resp: 18   Temp: 98.5 °F (36.9 °C)   SpO2: 100%   Weight: 65.3 kg (144 lb)   Height: 5' 1\" (1.549 m)            Physical Exam  Vitals signs and nursing note reviewed. Constitutional:       Appearance: She is well-developed. HENT:      Head: Normocephalic and atraumatic. Eyes:      Conjunctiva/sclera: Conjunctivae normal.   Neck:      Musculoskeletal: Normal range of motion. Cardiovascular:      Rate and Rhythm: Regular rhythm. Tachycardia present. Pulses: Normal pulses. Heart sounds: Normal heart sounds. No murmur. Pulmonary:      Effort: Pulmonary effort is normal. No respiratory distress. Breath sounds: Normal breath sounds. No stridor. No wheezing, rhonchi or rales. Chest:      Chest wall: No tenderness.    Abdominal:      General: Bowel sounds are normal. There is no distension. Palpations: Abdomen is soft. There is no mass. Tenderness: There is no tenderness. There is no guarding or rebound. Hernia: No hernia is present. Musculoskeletal: Normal range of motion. General: No swelling. Skin:     General: Skin is warm and dry. Neurological:      Mental Status: She is alert and oriented to person, place, and time. MDM  Number of Diagnoses or Management Options  Acute cystitis without hematuria:   Dehydration:   Hyperemesis gravidarum:   Diagnosis management comments: Check for dehydration and UTI. We will also get fetal heart tones. Check electrolytes as well. Will give some IV antiemetics and fluids. Amount and/or Complexity of Data Reviewed  Clinical lab tests: ordered and reviewed    Patient Progress  Patient progress: stable         Procedures    4:54 PM  Patient's results have been reviewed with them. Patient and/or family have verbally conveyed their understanding and agreement of the patient's signs, symptoms, diagnosis, treatment and prognosis and additionally agree to follow up as recommended or return to the Emergency Room should their condition change prior to follow-up. Discharge instructions have also been provided to the patient with some educational information regarding their diagnosis as well a list of reasons why they would want to return to the ER prior to their follow-up appointment should their condition change. Patient is not vomiting and is tolerated p.o. in the emergency department. She reports the oral Phenergan is not working. I will write her for Phenergan suppositories. She will call and see her GYN tomorrow. I performed her fetal heart tones by ultrasound the bedside and they were in the 160s. There was also fetal movement.

## 2020-01-07 LAB
BACTERIA SPEC CULT: NORMAL
CC UR VC: NORMAL
SERVICE CMNT-IMP: NORMAL

## 2020-01-08 ENCOUNTER — HOSPITAL ENCOUNTER (EMERGENCY)
Age: 30
Discharge: HOME | End: 2020-01-08
Attending: EMERGENCY MEDICINE | Admitting: EMERGENCY MEDICINE
Payer: MEDICAID

## 2020-01-08 VITALS
RESPIRATION RATE: 16 BRPM | DIASTOLIC BLOOD PRESSURE: 79 MMHG | TEMPERATURE: 98.7 F | OXYGEN SATURATION: 100 % | HEART RATE: 116 BPM | SYSTOLIC BLOOD PRESSURE: 112 MMHG | BODY MASS INDEX: 27.21 KG/M2 | WEIGHT: 144 LBS

## 2020-01-08 DIAGNOSIS — N39.0 URINARY TRACT INFECTION WITHOUT HEMATURIA, SITE UNSPECIFIED: ICD-10-CM

## 2020-01-08 DIAGNOSIS — O21.0 HYPEREMESIS ARISING DURING PREGNANCY: Primary | ICD-10-CM

## 2020-01-08 LAB
ALBUMIN SERPL-MCNC: 3 G/DL (ref 3.5–5)
ALBUMIN/GLOB SERPL: 0.7 {RATIO} (ref 1.1–2.2)
ALP SERPL-CCNC: 109 U/L (ref 45–117)
ALT SERPL-CCNC: 19 U/L (ref 12–78)
ANION GAP SERPL CALC-SCNC: 6 MMOL/L (ref 5–15)
APPEARANCE UR: ABNORMAL
AST SERPL-CCNC: 11 U/L (ref 15–37)
BACTERIA URNS QL MICRO: ABNORMAL /HPF
BASOPHILS # BLD: 0 K/UL (ref 0–0.1)
BASOPHILS NFR BLD: 0 % (ref 0–1)
BILIRUB SERPL-MCNC: 0.3 MG/DL (ref 0.2–1)
BILIRUB UR QL CFM: NEGATIVE
BUN SERPL-MCNC: 7 MG/DL (ref 6–20)
BUN/CREAT SERPL: 11 (ref 12–20)
CALCIUM SERPL-MCNC: 8.8 MG/DL (ref 8.5–10.1)
CHLORIDE SERPL-SCNC: 106 MMOL/L (ref 97–108)
CO2 SERPL-SCNC: 25 MMOL/L (ref 21–32)
COLOR UR: ABNORMAL
COMMENT, HOLDF: NORMAL
CREAT SERPL-MCNC: 0.65 MG/DL (ref 0.55–1.02)
DIFFERENTIAL METHOD BLD: ABNORMAL
EOSINOPHIL # BLD: 0.2 K/UL (ref 0–0.4)
EOSINOPHIL NFR BLD: 2 % (ref 0–7)
EPITH CASTS URNS QL MICRO: ABNORMAL /LPF
ERYTHROCYTE [DISTWIDTH] IN BLOOD BY AUTOMATED COUNT: 13.5 % (ref 11.5–14.5)
GLOBULIN SER CALC-MCNC: 4.4 G/DL (ref 2–4)
GLUCOSE SERPL-MCNC: 117 MG/DL (ref 65–100)
GLUCOSE UR STRIP.AUTO-MCNC: NEGATIVE MG/DL
HCT VFR BLD AUTO: 34.5 % (ref 35–47)
HGB BLD-MCNC: 11.8 G/DL (ref 11.5–16)
HGB UR QL STRIP: NEGATIVE
IMM GRANULOCYTES # BLD AUTO: 0 K/UL (ref 0–0.04)
IMM GRANULOCYTES NFR BLD AUTO: 1 % (ref 0–0.5)
KETONES UR QL STRIP.AUTO: 80 MG/DL
LEUKOCYTE ESTERASE UR QL STRIP.AUTO: ABNORMAL
LYMPHOCYTES # BLD: 0.9 K/UL (ref 0.8–3.5)
LYMPHOCYTES NFR BLD: 10 % (ref 12–49)
MCH RBC QN AUTO: 27.9 PG (ref 26–34)
MCHC RBC AUTO-ENTMCNC: 34.2 G/DL (ref 30–36.5)
MCV RBC AUTO: 81.6 FL (ref 80–99)
MONOCYTES # BLD: 0.5 K/UL (ref 0–1)
MONOCYTES NFR BLD: 5 % (ref 5–13)
MUCOUS THREADS URNS QL MICRO: ABNORMAL /LPF
NEUTS SEG # BLD: 7.3 K/UL (ref 1.8–8)
NEUTS SEG NFR BLD: 82 % (ref 32–75)
NITRITE UR QL STRIP.AUTO: NEGATIVE
NRBC # BLD: 0 K/UL (ref 0–0.01)
NRBC BLD-RTO: 0 PER 100 WBC
PH UR STRIP: 6 [PH] (ref 5–8)
PLATELET # BLD AUTO: 249 K/UL (ref 150–400)
PMV BLD AUTO: 9.1 FL (ref 8.9–12.9)
POTASSIUM SERPL-SCNC: 3.3 MMOL/L (ref 3.5–5.1)
PROT SERPL-MCNC: 7.4 G/DL (ref 6.4–8.2)
PROT UR STRIP-MCNC: 30 MG/DL
RBC # BLD AUTO: 4.23 M/UL (ref 3.8–5.2)
RBC #/AREA URNS HPF: ABNORMAL /HPF (ref 0–5)
SAMPLES BEING HELD,HOLD: NORMAL
SODIUM SERPL-SCNC: 137 MMOL/L (ref 136–145)
SP GR UR REFRACTOMETRY: >1.03 (ref 1–1.03)
UROBILINOGEN UR QL STRIP.AUTO: 2 EU/DL (ref 0.2–1)
WBC # BLD AUTO: 8.9 K/UL (ref 3.6–11)
WBC URNS QL MICRO: ABNORMAL /HPF (ref 0–4)

## 2020-01-08 PROCEDURE — 85025 COMPLETE CBC W/AUTO DIFF WBC: CPT

## 2020-01-08 PROCEDURE — 96361 HYDRATE IV INFUSION ADD-ON: CPT

## 2020-01-08 PROCEDURE — 74011250636 HC RX REV CODE- 250/636: Performed by: EMERGENCY MEDICINE

## 2020-01-08 PROCEDURE — 80053 COMPREHEN METABOLIC PANEL: CPT

## 2020-01-08 PROCEDURE — 36415 COLL VENOUS BLD VENIPUNCTURE: CPT

## 2020-01-08 PROCEDURE — 96374 THER/PROPH/DIAG INJ IV PUSH: CPT

## 2020-01-08 PROCEDURE — 99285 EMERGENCY DEPT VISIT HI MDM: CPT

## 2020-01-08 PROCEDURE — 74011000250 HC RX REV CODE- 250: Performed by: EMERGENCY MEDICINE

## 2020-01-08 PROCEDURE — 87086 URINE CULTURE/COLONY COUNT: CPT

## 2020-01-08 PROCEDURE — 96375 TX/PRO/DX INJ NEW DRUG ADDON: CPT

## 2020-01-08 PROCEDURE — 81001 URINALYSIS AUTO W/SCOPE: CPT

## 2020-01-08 RX ORDER — METOCLOPRAMIDE 10 MG/1
10 TABLET ORAL 4 TIMES DAILY
Qty: 20 TAB | Refills: 0 | Status: SHIPPED | OUTPATIENT
Start: 2020-01-08 | End: 2020-01-13

## 2020-01-08 RX ORDER — ONDANSETRON 2 MG/ML
4 INJECTION INTRAMUSCULAR; INTRAVENOUS
Status: COMPLETED | OUTPATIENT
Start: 2020-01-08 | End: 2020-01-08

## 2020-01-08 RX ORDER — PROMETHAZINE HYDROCHLORIDE 12.5 MG/1
TABLET ORAL
COMMUNITY
Start: 2019-12-24 | End: 2020-06-16

## 2020-01-08 RX ADMIN — WATER 1 G: 1 INJECTION INTRAMUSCULAR; INTRAVENOUS; SUBCUTANEOUS at 11:32

## 2020-01-08 RX ADMIN — SODIUM CHLORIDE 1000 ML: 900 INJECTION, SOLUTION INTRAVENOUS at 13:15

## 2020-01-08 RX ADMIN — SODIUM CHLORIDE 1000 ML: 900 INJECTION, SOLUTION INTRAVENOUS at 11:32

## 2020-01-08 RX ADMIN — ONDANSETRON 4 MG: 2 INJECTION INTRAMUSCULAR; INTRAVENOUS at 11:32

## 2020-01-08 NOTE — ED PROVIDER NOTES
I have evaluated the patient as the Provider in Triage. I have reviewed Her vital signs and the triage nurse assessment. I have talked with the patient and any available family and advised that I am the provider in triage and have ordered the appropriate study to initiate their work up based on the clinical presentation during my assessment. I have advised that the patient will be accommodated in the Main ED as soon as possible. I have also requested to contact the triage nurse or myself immediately if the patient experiences any changes in their condition during this brief waiting period. Note written by Melany Cooks, Scribe, as dictated by Christa Joshua NP 1:04 PM  ----  26 y.o. female with past medical history significant for gestational diabetes, HTN in pregnancy, PTSD, and bipolar disorder who presents via POV with chief complaint of nausea and vomiting. Pt is ,(11 week pregnant) and was diagnosed with hyperemesis related to her pregnancy. She has daily nausea and vomiting that varies in frequency each day, and has tried phenergan (oral and suppository), and zofran with little relief. Pt was seen in ED 20 for this problem, but also diagnosed with a UTI, and given oral Macrobid, however she has been unable to keep the medicine down, and is concerned as her UTI is essentially not being treated because her body cant absorb the medication. She called her OBGYN with this complaint and was advised to ED. Pt also endorses current abdominal cramping, and fever on (20). Pt specifically denies back pain and vaginal bleeding. There are no other acute medical concerns at this time. Social hx: denies tobacco use; denies current EtOH use; denies illicit drug use. PCP: Moo Bennett NP  OBGYN: Dr. Melinda Skaggs ([facility])    Note written by Harrison Mohamud, as dictated by Mirella Wiggins MD 1:14 PM      The history is provided by the patient. No  was used. Past Medical History:   Diagnosis Date    Bipolar disorder ([facility] [address]     Gestational diabetes     Hypertension in pregnancy     Ill-defined condition 01/2019    right shoulder injury/pain    PTSD (post-traumatic stress disorder)        Past Surgical History:   Procedure Laterality Date    HX APPENDECTOMY      HX LEEP PROCEDURE  06/2019    HX UROLOGICAL      kidney stones         Family History:   Problem Relation Age of Onset    Stroke Mother     Hypertension Mother     Hypertension Father     MS Father        Social History     Socioeconomic History    Marital status: SINGLE     Spouse name: Not on file    Number of children: Not on file    Years of education: Not on file    Highest education level: Not on file   Occupational History    Not on file   Social Needs    Financial resource strain: Not on file    Food insecurity:     Worry: Not on file     Inability: Not on file    Transportation needs:     Medical: Not on file     Non-medical: Not on file   Tobacco Use    Smoking status: Never Smoker    Smokeless tobacco: Never Used   Substance and Sexual Activity    Alcohol use: Not Currently     Frequency: Never    Drug use: Never    Sexual activity: Yes   Lifestyle    Physical activity:     Days per week: Not on file     Minutes per session: Not on file    Stress: Not on file   Relationships    Social connections:     Talks on phone: Not on file     Gets together: Not on file     Attends Restorationism service: Not on file     Active member of club or organization: Not on file     Attends meetings of clubs or organizations: Not on file     Relationship status: Not on file    Intimate partner violence:     Fear of current or ex partner: Not on file     Emotionally abused: Not on file     Physically abused: Not on file     Forced sexual activity: Not on file   Other Topics Concern    Not on file   Social History Narrative    Not on file         ALLERGIES: Latex and Iodine    Review of Systems Constitutional: Negative for diaphoresis. Eyes: Negative for visual disturbance. Respiratory: Negative for cough, shortness of breath and wheezing. Cardiovascular: Negative for chest pain and leg swelling. Gastrointestinal: Positive for abdominal pain, nausea and vomiting. Negative for diarrhea. Genitourinary: Negative for dysuria. Musculoskeletal: Negative. Negative for back pain and neck stiffness. Skin: Negative for rash. Neurological: Negative. Negative for syncope and headaches. Psychiatric/Behavioral: Negative for confusion. All other systems reviewed and are negative. Vitals:    01/08/20 1052   BP: 121/69   Pulse: (!) 116   Resp: 16   Temp: 98.7 °F (37.1 °C)   SpO2: 99%   Weight: 65.3 kg (144 lb)            Physical Exam  Vitals signs and nursing note reviewed. Constitutional:       General: She is not in acute distress. Appearance: She is well-developed. HENT:      Head: Normocephalic. Eyes:      Pupils: Pupils are equal, round, and reactive to light. Neck:      Musculoskeletal: Normal range of motion. Cardiovascular:      Rate and Rhythm: Normal rate and regular rhythm. Heart sounds: No murmur. Pulmonary:      Effort: Pulmonary effort is normal. No respiratory distress. Breath sounds: Normal breath sounds. Abdominal:      Palpations: Abdomen is soft. Tenderness: There is no tenderness. Genitourinary:     Comments: Gravid uterus  Musculoskeletal: Normal range of motion. Skin:     General: Skin is warm and dry. Neurological:      Mental Status: She is alert. Cranial Nerves: No cranial nerve deficit. Psychiatric:         Behavior: Behavior normal.      Note written by Harrison Montelongo, as dictated by Saad Smith MD 1:23 PM    [facility]       Procedures    CONSULT NOTE:  1:28 PM Saad Smith MD spoke with Dr. Varun Min. [facility], Consult for OBGYN. Discussed available diagnostic tests and clinical findings.   Dr. Cecilia Serrano recommends more fluids and sending her home on Reglan 10 QID and continued Zofran for a few days.

## 2020-01-08 NOTE — DISCHARGE INSTRUCTIONS

## 2020-01-08 NOTE — ED TRIAGE NOTES
N/v on UTI antibiotics. 11 weeks pregnant. Sent by Anayeli Ponce MD for further eval. Vomiting bf the antibiotics.

## 2020-01-09 LAB
BACTERIA SPEC CULT: NORMAL
CC UR VC: NORMAL
SERVICE CMNT-IMP: NORMAL

## 2020-01-12 ENCOUNTER — HOSPITAL ENCOUNTER (EMERGENCY)
Age: 30
Discharge: HOME | End: 2020-01-12
Attending: STUDENT IN AN ORGANIZED HEALTH CARE EDUCATION/TRAINING PROGRAM
Payer: MEDICAID

## 2020-01-12 VITALS
HEART RATE: 95 BPM | SYSTOLIC BLOOD PRESSURE: 136 MMHG | OXYGEN SATURATION: 98 % | WEIGHT: 144 LBS | TEMPERATURE: 97.9 F | BODY MASS INDEX: 27.19 KG/M2 | DIASTOLIC BLOOD PRESSURE: 82 MMHG | HEIGHT: 61 IN | RESPIRATION RATE: 17 BRPM

## 2020-01-12 DIAGNOSIS — R10.9 FLANK PAIN: ICD-10-CM

## 2020-01-12 DIAGNOSIS — N39.0 URINARY TRACT INFECTION WITH HEMATURIA, SITE UNSPECIFIED: Primary | ICD-10-CM

## 2020-01-12 DIAGNOSIS — R31.9 URINARY TRACT INFECTION WITH HEMATURIA, SITE UNSPECIFIED: Primary | ICD-10-CM

## 2020-01-12 LAB
ALBUMIN SERPL-MCNC: 3 G/DL (ref 3.5–5)
ALBUMIN/GLOB SERPL: 0.7 {RATIO} (ref 1.1–2.2)
ALP SERPL-CCNC: 122 U/L (ref 45–117)
ALT SERPL-CCNC: 19 U/L (ref 12–78)
ANION GAP SERPL CALC-SCNC: 6 MMOL/L (ref 5–15)
APPEARANCE UR: ABNORMAL
AST SERPL-CCNC: 18 U/L (ref 15–37)
BACTERIA URNS QL MICRO: ABNORMAL /HPF
BASOPHILS # BLD: 0 K/UL (ref 0–0.1)
BASOPHILS NFR BLD: 0 % (ref 0–1)
BILIRUB SERPL-MCNC: 0.3 MG/DL (ref 0.2–1)
BILIRUB UR QL CFM: NEGATIVE
BUN SERPL-MCNC: 6 MG/DL (ref 6–20)
BUN/CREAT SERPL: 9 (ref 12–20)
CALCIUM SERPL-MCNC: 9.1 MG/DL (ref 8.5–10.1)
CAOX CRY URNS QL MICRO: ABNORMAL
CHLORIDE SERPL-SCNC: 106 MMOL/L (ref 97–108)
CO2 SERPL-SCNC: 26 MMOL/L (ref 21–32)
COLOR UR: ABNORMAL
COMMENT, HOLDF: NORMAL
CREAT SERPL-MCNC: 0.7 MG/DL (ref 0.55–1.02)
DIFFERENTIAL METHOD BLD: ABNORMAL
EOSINOPHIL # BLD: 0.1 K/UL (ref 0–0.4)
EOSINOPHIL NFR BLD: 2 % (ref 0–7)
EPITH CASTS URNS QL MICRO: ABNORMAL /LPF
ERYTHROCYTE [DISTWIDTH] IN BLOOD BY AUTOMATED COUNT: 13.2 % (ref 11.5–14.5)
GLOBULIN SER CALC-MCNC: 4.4 G/DL (ref 2–4)
GLUCOSE SERPL-MCNC: 110 MG/DL (ref 65–100)
GLUCOSE UR STRIP.AUTO-MCNC: NEGATIVE MG/DL
HCT VFR BLD AUTO: 34.9 % (ref 35–47)
HGB BLD-MCNC: 12 G/DL (ref 11.5–16)
HGB UR QL STRIP: ABNORMAL
IMM GRANULOCYTES # BLD AUTO: 0.1 K/UL (ref 0–0.04)
IMM GRANULOCYTES NFR BLD AUTO: 1 % (ref 0–0.5)
KETONES UR QL STRIP.AUTO: 15 MG/DL
LEUKOCYTE ESTERASE UR QL STRIP.AUTO: ABNORMAL
LIPASE SERPL-CCNC: 78 U/L (ref 73–393)
LYMPHOCYTES # BLD: 1.3 K/UL (ref 0.8–3.5)
LYMPHOCYTES NFR BLD: 20 % (ref 12–49)
MCH RBC QN AUTO: 28.2 PG (ref 26–34)
MCHC RBC AUTO-ENTMCNC: 34.4 G/DL (ref 30–36.5)
MCV RBC AUTO: 81.9 FL (ref 80–99)
MONOCYTES # BLD: 0.4 K/UL (ref 0–1)
MONOCYTES NFR BLD: 6 % (ref 5–13)
MUCOUS THREADS URNS QL MICRO: ABNORMAL /LPF
NEUTS SEG # BLD: 4.7 K/UL (ref 1.8–8)
NEUTS SEG NFR BLD: 71 % (ref 32–75)
NITRITE UR QL STRIP.AUTO: NEGATIVE
NRBC # BLD: 0 K/UL (ref 0–0.01)
NRBC BLD-RTO: 0 PER 100 WBC
PH UR STRIP: 6 [PH] (ref 5–8)
PLATELET # BLD AUTO: 313 K/UL (ref 150–400)
PMV BLD AUTO: 8.8 FL (ref 8.9–12.9)
POTASSIUM SERPL-SCNC: 3.4 MMOL/L (ref 3.5–5.1)
PROT SERPL-MCNC: 7.4 G/DL (ref 6.4–8.2)
PROT UR STRIP-MCNC: 30 MG/DL
RBC # BLD AUTO: 4.26 M/UL (ref 3.8–5.2)
RBC #/AREA URNS HPF: ABNORMAL /HPF (ref 0–5)
SAMPLES BEING HELD,HOLD: NORMAL
SODIUM SERPL-SCNC: 138 MMOL/L (ref 136–145)
SP GR UR REFRACTOMETRY: 1.02 (ref 1–1.03)
UROBILINOGEN UR QL STRIP.AUTO: 1 EU/DL (ref 0.2–1)
WBC # BLD AUTO: 6.6 K/UL (ref 3.6–11)
WBC URNS QL MICRO: ABNORMAL /HPF (ref 0–4)

## 2020-01-12 PROCEDURE — 87086 URINE CULTURE/COLONY COUNT: CPT

## 2020-01-12 PROCEDURE — 74011250636 HC RX REV CODE- 250/636: Performed by: NURSE PRACTITIONER

## 2020-01-12 PROCEDURE — 96361 HYDRATE IV INFUSION ADD-ON: CPT

## 2020-01-12 PROCEDURE — 36415 COLL VENOUS BLD VENIPUNCTURE: CPT

## 2020-01-12 PROCEDURE — 74011250636 HC RX REV CODE- 250/636: Performed by: FAMILY MEDICINE

## 2020-01-12 PROCEDURE — 96375 TX/PRO/DX INJ NEW DRUG ADDON: CPT

## 2020-01-12 PROCEDURE — 85025 COMPLETE CBC W/AUTO DIFF WBC: CPT

## 2020-01-12 PROCEDURE — 83690 ASSAY OF LIPASE: CPT

## 2020-01-12 PROCEDURE — 81001 URINALYSIS AUTO W/SCOPE: CPT

## 2020-01-12 PROCEDURE — 80053 COMPREHEN METABOLIC PANEL: CPT

## 2020-01-12 PROCEDURE — 99284 EMERGENCY DEPT VISIT MOD MDM: CPT

## 2020-01-12 PROCEDURE — 74011250637 HC RX REV CODE- 250/637: Performed by: FAMILY MEDICINE

## 2020-01-12 PROCEDURE — 96374 THER/PROPH/DIAG INJ IV PUSH: CPT

## 2020-01-12 RX ORDER — CEPHALEXIN 500 MG/1
500 CAPSULE ORAL 2 TIMES DAILY
Qty: 14 CAP | Refills: 0 | Status: SHIPPED | OUTPATIENT
Start: 2020-01-12 | End: 2020-01-19

## 2020-01-12 RX ORDER — METOCLOPRAMIDE HYDROCHLORIDE 5 MG/ML
10 INJECTION INTRAMUSCULAR; INTRAVENOUS
Status: COMPLETED | OUTPATIENT
Start: 2020-01-12 | End: 2020-01-12

## 2020-01-12 RX ORDER — ACETAMINOPHEN 500 MG
1000 TABLET ORAL ONCE
Status: COMPLETED | OUTPATIENT
Start: 2020-01-12 | End: 2020-01-12

## 2020-01-12 RX ORDER — ONDANSETRON 2 MG/ML
4 INJECTION INTRAMUSCULAR; INTRAVENOUS
Status: COMPLETED | OUTPATIENT
Start: 2020-01-12 | End: 2020-01-12

## 2020-01-12 RX ADMIN — SODIUM CHLORIDE 1000 ML: 900 INJECTION, SOLUTION INTRAVENOUS at 19:15

## 2020-01-12 RX ADMIN — ACETAMINOPHEN 1000 MG: 500 TABLET ORAL at 19:41

## 2020-01-12 RX ADMIN — ONDANSETRON 4 MG: 2 INJECTION INTRAMUSCULAR; INTRAVENOUS at 19:22

## 2020-01-12 RX ADMIN — METOCLOPRAMIDE 10 MG: 5 INJECTION, SOLUTION INTRAMUSCULAR; INTRAVENOUS at 19:40

## 2020-01-12 NOTE — ED NOTES
6:54 PM  I have evaluated the patient as the Provider in Triage. I have reviewed Her vital signs and the triage nurse assessment. I have talked with the patient and any available family and advised that I am the provider in triage and have ordered the appropriate study to initiate their work up based on the clinical presentation during my assessment. I have advised that the patient will be accommodated in the Main ED as soon as possible. I have also requested to contact the triage nurse or myself immediately if the patient experiences any changes in their condition during this brief waiting period. Acute onset right flank pain radiating into right abdomen. No nausea, no vomiting. Patient recently on [address] for UTI. 12 weeks pregnant with no vaginal bleeding or discharge.  [address], NP

## 2020-01-12 NOTE — ED TRIAGE NOTES
Patient arrives to the ER complaining of new onset right flank pain, patient states that she was here last week and diagnosed with a UTI. Patient is 12 weeks pregnant. Still unable to keep fluids down.

## 2020-01-13 NOTE — ED PROVIDER NOTES
HPI     27 yo F with PMHx of bipolar disorder, HTN in pregnancy, PTSD, gestational diabetes and hyperemesis gravidarum who presents today for severe flank pain. She is currently 12 weeks pregnant. RADHA is Dr. Sofie Ruelas. Patient has been seen in the ER 2 times on 01/05 and 01/08 where she was diagnosed with UTI. She was treated with Macrobid but she was unable to tolerate the medicine. She has been taking Reglan and Zofran for the nausea and vomiting which has been helping  2 hours ago she started having flank pain on the right side. It started slowly and now is severe. She describes the pain as sharp, 10/10 that radiates to the front side of her abdomen on the right.  She tried heating pads without help  She says she has a history of kidney stones in the past.   She denies fever, chills, CP, SOB, lightheadedness, diarrhea, dysuria, hematuria      Past Medical History:   Diagnosis Date    Bipolar disorder ([facility] [address]     Gestational diabetes     Hypertension in pregnancy     Ill-defined condition 01/2019    right shoulder injury/pain    PTSD (post-traumatic stress disorder)        Past Surgical History:   Procedure Laterality Date    HX APPENDECTOMY      HX LEEP PROCEDURE  06/2019    HX UROLOGICAL      kidney stones         Family History:   Problem Relation Age of Onset    Stroke Mother     Hypertension Mother     Hypertension Father     MS Father        Social History     Socioeconomic History    Marital status: SINGLE     Spouse name: Not on file    Number of children: Not on file    Years of education: Not on file    Highest education level: Not on file   Occupational History    Not on file   Social Needs    Financial resource strain: Not on file    Food insecurity:     Worry: Not on file     Inability: Not on file    Transportation needs:     Medical: Not on file     Non-medical: Not on file   Tobacco Use    Smoking status: Never Smoker    Smokeless tobacco: Never Used   Substance and Sexual Activity    Alcohol use: Not Currently     Frequency: Never    Drug use: Never    Sexual activity: Yes   Lifestyle    Physical activity:     Days per week: Not on file     Minutes per session: Not on file    Stress: Not on file   Relationships    Social connections:     Talks on phone: Not on file     Gets together: Not on file     Attends Shinto service: Not on file     Active member of club or organization: Not on file     Attends meetings of clubs or organizations: Not on file     Relationship status: Not on file    Intimate partner violence:     Fear of current or ex partner: Not on file     Emotionally abused: Not on file     Physically abused: Not on file     Forced sexual activity: Not on file   Other Topics Concern    Not on file   Social History Narrative    Not on file         ALLERGIES: Latex and Iodine    Review of Systems   Constitutional: Negative. HENT: Negative. Eyes: Negative. Respiratory: Negative. Cardiovascular: Negative. Gastrointestinal: Positive for abdominal pain, nausea and vomiting. Genitourinary: Positive for flank pain. Negative for dysuria, frequency and hematuria. Skin: Negative. Neurological: Negative. Psychiatric/Behavioral: Negative. Vitals:    01/12/20 1854   BP: (!) 180/108   Pulse: (!) 103   Resp: 19   Temp: 97.9 °F (36.6 °C)   SpO2: 96%   Weight: 65.3 kg (144 lb)   Height: 5' 1\" (1.549 m)            Physical Exam  Vitals signs reviewed. Constitutional:       General: She is in acute distress. HENT:      Head: Normocephalic and atraumatic. Nose: Nose normal.      Mouth/Throat:      Mouth: Mucous membranes are moist.      Pharynx: Oropharynx is clear. Eyes:      Conjunctiva/sclera: Conjunctivae normal.      Pupils: Pupils are equal, round, and reactive to light. Neck:      Musculoskeletal: Normal range of motion. Cardiovascular:      Rate and Rhythm: Regular rhythm. Tachycardia present. Heart sounds: No murmur. Pulmonary:      Effort: Pulmonary effort is normal.      Breath sounds: Normal breath sounds. No wheezing or rales. Abdominal:      General: Bowel sounds are normal. There is no distension. Tenderness: There is tenderness. There is right CVA tenderness. There is no left CVA tenderness or guarding. Comments: Gravid uterus   Musculoskeletal: Normal range of motion. Right lower leg: No edema. Left lower leg: No edema. Skin:     General: Skin is warm and dry. Findings: No rash. Neurological:      Mental Status: She is alert and oriented to person, place, and time. Motor: No weakness. Psychiatric:         Mood and Affect: Mood normal.         Behavior: Behavior normal.          MDM  Number of Diagnoses or Management Options  Flank pain:   Urinary tract infection with hematuria, site unspecified:      Amount and/or Complexity of Data Reviewed  Clinical lab tests: ordered and reviewed  Tests in the medicine section of CPT®: ordered and reviewed  Review and summarize past medical records: yes  Discuss the patient with other providers: yes    Risk of Complications, Morbidity, and/or Mortality  Presenting problems: moderate  Diagnostic procedures: moderate  Management options: moderate    Patient Progress  Patient progress: stable         Procedures    27 yo F with flank pain. Differential include UTI, pyelonephritis, kidney stone. - Await UA results  - Tylenol 1000 mg now. Avoid NSAIDs and opioids as she is currently 12 weeks pregnant     7:34PM  - patient still nauseous. Will give one dose of Reglan now    9:20PM  - UA with blood, LE, few calcium oxalate crystals and 1+ Bacteria. Symptoms likely to infected stone. Will send urine culture and treat with Keflex. Discussed importance of taking antibiotic to patient to avoid being septic and avoiding issues with pregnancy.  Close follow up with OBGYN

## 2020-01-14 LAB
BACTERIA SPEC CULT: NORMAL
CC UR VC: NORMAL
SERVICE CMNT-IMP: NORMAL

## 2020-05-01 ENCOUNTER — HOSPITAL ENCOUNTER (EMERGENCY)
Age: 30
Discharge: HOME | End: 2020-05-01
Attending: STUDENT IN AN ORGANIZED HEALTH CARE EDUCATION/TRAINING PROGRAM | Admitting: STUDENT IN AN ORGANIZED HEALTH CARE EDUCATION/TRAINING PROGRAM
Payer: MEDICAID

## 2020-05-01 VITALS
DIASTOLIC BLOOD PRESSURE: 60 MMHG | SYSTOLIC BLOOD PRESSURE: 125 MMHG | BODY MASS INDEX: 28.34 KG/M2 | HEIGHT: 62 IN | RESPIRATION RATE: 16 BRPM | WEIGHT: 154 LBS | HEART RATE: 109 BPM | TEMPERATURE: 98 F | OXYGEN SATURATION: 99 %

## 2020-05-01 LAB
ERYTHROCYTE [DISTWIDTH] IN BLOOD BY AUTOMATED COUNT: 13.2 % (ref 11.5–14.5)
FETAL BLOOD VOL PATIENT KLEIH BETKE: NORMAL ML
HCT VFR BLD AUTO: 26.2 % (ref 35–47)
HGB BLD-MCNC: 8.7 G/DL (ref 11.5–16)
MCH RBC QN AUTO: 27.2 PG (ref 26–34)
MCHC RBC AUTO-ENTMCNC: 33.2 G/DL (ref 30–36.5)
MCV RBC AUTO: 81.9 FL (ref 80–99)
NRBC # BLD: 0 K/UL (ref 0–0.01)
NRBC BLD-RTO: 0 PER 100 WBC
PLATELET # BLD AUTO: 247 K/UL (ref 150–400)
PMV BLD AUTO: 9.1 FL (ref 8.9–12.9)
RBC # BLD AUTO: 3.2 M/UL (ref 3.8–5.2)
WBC # BLD AUTO: 8.6 K/UL (ref 3.6–11)

## 2020-05-01 PROCEDURE — 99283 EMERGENCY DEPT VISIT LOW MDM: CPT

## 2020-05-01 PROCEDURE — 74011250636 HC RX REV CODE- 250/636: Performed by: OBSTETRICS & GYNECOLOGY

## 2020-05-01 PROCEDURE — 96360 HYDRATION IV INFUSION INIT: CPT

## 2020-05-01 PROCEDURE — 96361 HYDRATE IV INFUSION ADD-ON: CPT

## 2020-05-01 PROCEDURE — 36415 COLL VENOUS BLD VENIPUNCTURE: CPT

## 2020-05-01 PROCEDURE — 59025 FETAL NON-STRESS TEST: CPT

## 2020-05-01 PROCEDURE — 85460 HEMOGLOBIN FETAL: CPT

## 2020-05-01 PROCEDURE — 85027 COMPLETE CBC AUTOMATED: CPT

## 2020-05-01 RX ORDER — ACETAMINOPHEN 500 MG
1000 TABLET ORAL
Status: DISCONTINUED | OUTPATIENT
Start: 2020-05-01 | End: 2020-05-02 | Stop reason: HOSPADM

## 2020-05-01 RX ORDER — ASPIRIN 325 MG
81 TABLET ORAL DAILY
COMMUNITY
End: 2020-06-16

## 2020-05-01 RX ORDER — SODIUM CHLORIDE, SODIUM LACTATE, POTASSIUM CHLORIDE, CALCIUM CHLORIDE 600; 310; 30; 20 MG/100ML; MG/100ML; MG/100ML; MG/100ML
125 INJECTION, SOLUTION INTRAVENOUS CONTINUOUS
Status: DISCONTINUED | OUTPATIENT
Start: 2020-05-01 | End: 2020-05-02 | Stop reason: HOSPADM

## 2020-05-01 RX ORDER — SODIUM CHLORIDE 0.9 % (FLUSH) 0.9 %
5-10 SYRINGE (ML) INJECTION EVERY 8 HOURS
Status: DISCONTINUED | OUTPATIENT
Start: 2020-05-01 | End: 2020-05-02 | Stop reason: HOSPADM

## 2020-05-01 RX ADMIN — SODIUM CHLORIDE, SODIUM LACTATE, POTASSIUM CHLORIDE, AND CALCIUM CHLORIDE 1000 ML: 600; 310; 30; 20 INJECTION, SOLUTION INTRAVENOUS at 17:07

## 2020-05-01 RX ADMIN — SODIUM CHLORIDE, SODIUM LACTATE, POTASSIUM CHLORIDE, AND CALCIUM CHLORIDE 125 ML/HR: 600; 310; 30; 20 INJECTION, SOLUTION INTRAVENOUS at 18:12

## 2020-05-01 RX ADMIN — SODIUM CHLORIDE, SODIUM LACTATE, POTASSIUM CHLORIDE, AND CALCIUM CHLORIDE 1000 ML: 600; 310; 30; 20 INJECTION, SOLUTION INTRAVENOUS at 20:27
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
## 2020-05-02 NOTE — PROGRESS NOTES
1920: SBAR report received from Taina Buckley RN. 1930: Pt resting comfortably in bed. Denies any vaginal bleeding or loss of fluid. Reports fetal movement. 2032: Dr. Sandrita Becker notified of FHR deceleration followed by fetal tachycardia. Dr. Sandrita Becker reviewing EFM. MD aware pt repositioned and IV fluid bolus initiated. No additional orders at this time. 2127: Dr. Sandrita Becker called and asked if ok to discharge patient home as her prolonged monitoring was done at 2100. Dr. Sandrita Becker notified FHR baseline has returned to normal with no additional decels for the past hour. Dr. Sandrita Becker gave telephone order to discharge patient home at this time. 2135: Written discharge instructions reviewed with patient. Patient advised to notify MD if she experiences decreased fetal movement, vaginal bleeding, loss of fluid, or contractions. Pt states she has a scheduled appointment with primary OB on Monday. Advised patient to keep this appointment. Pt denies any additional questions or concerns at this time. 2141: Pt left ambulatory with significant other in no signs of distress.

## 2020-06-08 ENCOUNTER — HOSPITAL ENCOUNTER (OUTPATIENT)
Age: 30
Setting detail: OBSERVATION
Discharge: HOME | End: 2020-06-09
Attending: STUDENT IN AN ORGANIZED HEALTH CARE EDUCATION/TRAINING PROGRAM | Admitting: STUDENT IN AN ORGANIZED HEALTH CARE EDUCATION/TRAINING PROGRAM
Payer: MEDICAID

## 2020-06-08 LAB
ALBUMIN SERPL-MCNC: 2.3 G/DL (ref 3.5–5)
ALBUMIN/GLOB SERPL: 0.7 {RATIO} (ref 1.1–2.2)
ALP SERPL-CCNC: 165 U/L (ref 45–117)
ALT SERPL-CCNC: 11 U/L (ref 12–78)
ANION GAP SERPL CALC-SCNC: 9 MMOL/L (ref 5–15)
AST SERPL-CCNC: 11 U/L (ref 15–37)
BASOPHILS # BLD: 0 K/UL (ref 0–0.1)
BASOPHILS NFR BLD: 0 % (ref 0–1)
BILIRUB SERPL-MCNC: 0.2 MG/DL (ref 0.2–1)
BUN SERPL-MCNC: 7 MG/DL (ref 6–20)
BUN/CREAT SERPL: 10 (ref 12–20)
CALCIUM SERPL-MCNC: 8 MG/DL (ref 8.5–10.1)
CHLORIDE SERPL-SCNC: 109 MMOL/L (ref 97–108)
CO2 SERPL-SCNC: 21 MMOL/L (ref 21–32)
CREAT SERPL-MCNC: 0.72 MG/DL (ref 0.55–1.02)
CREAT UR-MCNC: 62 MG/DL
DIFFERENTIAL METHOD BLD: ABNORMAL
EOSINOPHIL # BLD: 0.6 K/UL (ref 0–0.4)
EOSINOPHIL NFR BLD: 7 % (ref 0–7)
ERYTHROCYTE [DISTWIDTH] IN BLOOD BY AUTOMATED COUNT: 13.6 % (ref 11.5–14.5)
GLOBULIN SER CALC-MCNC: 3.3 G/DL (ref 2–4)
GLUCOSE SERPL-MCNC: 90 MG/DL (ref 65–100)
HCT VFR BLD AUTO: 23.6 % (ref 35–47)
HGB BLD-MCNC: 7.5 G/DL (ref 11.5–16)
IMM GRANULOCYTES # BLD AUTO: 0.1 K/UL (ref 0–0.04)
IMM GRANULOCYTES NFR BLD AUTO: 2 % (ref 0–0.5)
LDH SERPL L TO P-CCNC: 180 U/L (ref 81–246)
LYMPHOCYTES # BLD: 1.1 K/UL (ref 0.8–3.5)
LYMPHOCYTES NFR BLD: 14 % (ref 12–49)
MCH RBC QN AUTO: 24.8 PG (ref 26–34)
MCHC RBC AUTO-ENTMCNC: 31.8 G/DL (ref 30–36.5)
MCV RBC AUTO: 77.9 FL (ref 80–99)
MONOCYTES # BLD: 0.5 K/UL (ref 0–1)
MONOCYTES NFR BLD: 6 % (ref 5–13)
NEUTS SEG # BLD: 5.6 K/UL (ref 1.8–8)
NEUTS SEG NFR BLD: 71 % (ref 32–75)
NRBC # BLD: 0 K/UL (ref 0–0.01)
NRBC BLD-RTO: 0 PER 100 WBC
PLATELET # BLD AUTO: 240 K/UL (ref 150–400)
PMV BLD AUTO: 9.8 FL (ref 8.9–12.9)
POTASSIUM SERPL-SCNC: 3.4 MMOL/L (ref 3.5–5.1)
PROT SERPL-MCNC: 5.6 G/DL (ref 6.4–8.2)
PROT UR-MCNC: 15 MG/DL (ref 0–11.9)
PROT/CREAT UR-RTO: 0.2
RBC # BLD AUTO: 3.03 M/UL (ref 3.8–5.2)
SODIUM SERPL-SCNC: 139 MMOL/L (ref 136–145)
URATE SERPL-MCNC: 3.8 MG/DL (ref 2.6–6)
WBC # BLD AUTO: 7.8 K/UL (ref 3.6–11)

## 2020-06-08 PROCEDURE — 80053 COMPREHEN METABOLIC PANEL: CPT

## 2020-06-08 PROCEDURE — 99218 HC RM OBSERVATION: CPT

## 2020-06-08 PROCEDURE — 74011250637 HC RX REV CODE- 250/637: Performed by: OBSTETRICS & GYNECOLOGY

## 2020-06-08 PROCEDURE — 36415 COLL VENOUS BLD VENIPUNCTURE: CPT

## 2020-06-08 PROCEDURE — 83615 LACTATE (LD) (LDH) ENZYME: CPT

## 2020-06-08 PROCEDURE — 84550 ASSAY OF BLOOD/URIC ACID: CPT

## 2020-06-08 PROCEDURE — 74011250636 HC RX REV CODE- 250/636: Performed by: STUDENT IN AN ORGANIZED HEALTH CARE EDUCATION/TRAINING PROGRAM

## 2020-06-08 PROCEDURE — 84156 ASSAY OF PROTEIN URINE: CPT

## 2020-06-08 PROCEDURE — 59025 FETAL NON-STRESS TEST: CPT

## 2020-06-08 PROCEDURE — 85025 COMPLETE CBC W/AUTO DIFF WBC: CPT

## 2020-06-08 RX ORDER — LANOLIN ALCOHOL/MO/W.PET/CERES
1 CREAM (GRAM) TOPICAL 2 TIMES DAILY WITH MEALS
Status: DISCONTINUED | OUTPATIENT
Start: 2020-06-08 | End: 2020-06-09 | Stop reason: HOSPADM

## 2020-06-08 RX ORDER — BETAMETHASONE SODIUM PHOSPHATE AND BETAMETHASONE ACETATE 3; 3 MG/ML; MG/ML
12 INJECTION, SUSPENSION INTRA-ARTICULAR; INTRALESIONAL; INTRAMUSCULAR; SOFT TISSUE EVERY 24 HOURS
Status: DISCONTINUED | OUTPATIENT
Start: 2020-06-08 | End: 2020-06-09 | Stop reason: HOSPADM

## 2020-06-08 RX ORDER — LANOLIN ALCOHOL/MO/W.PET/CERES
1 CREAM (GRAM) TOPICAL 2 TIMES DAILY WITH MEALS
Status: DISCONTINUED | OUTPATIENT
Start: 2020-06-08 | End: 2020-06-08

## 2020-06-08 RX ADMIN — BETAMETHASONE SODIUM PHOSPHATE AND BETAMETHASONE ACETATE 12 MG: 3; 3 INJECTION, SUSPENSION INTRA-ARTICULAR; INTRALESIONAL; INTRAMUSCULAR at 17:14

## 2020-06-08 RX ADMIN — FERROUS SULFATE TAB 325 MG (65 MG ELEMENTAL FE) 325 MG: 325 (65 FE) TAB at 20:48

## 2020-06-08 NOTE — PROGRESS NOTES
1858: Bedside and Verbal shift change report given to JULIANNE Diego RN (oncoming nurse) by Jessie Mg RN (offgoing nurse). Report included the following information SBAR, Kardex, Procedure Summary, Intake/Output, MAR and Recent Results. 0700:Bedside and Verbal shift change report given to YVONNE Mckay RN (oncoming nurse) by Caesar Daley (offgoing nurse). Report included the following information SBAR, Kardex, Procedure Summary, Intake/Output, MAR and Recent Results.

## 2020-06-08 NOTE — H&P
Obstetrics Admission H&P    Lisa Jarvis  [id number]  1990    Chief Complaint:  Pregnancy and PIH    HPI: 31 y.o. female  33w0d with Estimated Date of Delivery: 20  Pregnancy has been complicated by pregnancy induced hypertension. Seen in office today for blood pressure check. Noted to have severe range blood pressures and a headache. Was seen last week with similar complaints and noted to have negative labs. Does have a history of preE. Patient is anemic. ROS:  Pertinent items are noted in HPI.     OB History        15    Para   3    Term   3            AB   9    Living   3       SAB   9    TAB        Ectopic        Molar        Multiple        Live Births   3              Past Medical History:   Diagnosis Date    Abnormal Papanicolaou smear of cervix     Anemia     [facility] disorder ([facility] [address]     Gestational diabetes     Hypertension in pregnancy     pre-E with prior pregnancy    Ill-defined condition 2019    right shoulder injury/pain    Kidney disease     kidney stones    Postpartum depression     PTSD (post-traumatic stress disorder)     Trauma      Past Surgical History:   Procedure Laterality Date    HX APPENDECTOMY      HX LEEP PROCEDURE  2019    HX OTHER SURGICAL      HX UROLOGICAL      kidney stones     Social History     Socioeconomic History    Marital status: SINGLE     Spouse name: Not on file    Number of children: Not on file    Years of education: Not on file    Highest education level: Not on file   Occupational History    Not on file   Social Needs    Financial resource strain: Not on file    Food insecurity     Worry: Not on file     Inability: Not on file    Transportation needs     Medical: Not on file     Non-medical: Not on file   Tobacco Use    Smoking status: Never Smoker    Smokeless tobacco: Never Used   Substance and Sexual Activity    Alcohol use: Not Currently     Frequency: Never    Drug use: Never    Sexual activity: Yes   Lifestyle    Physical activity     Days per week: Not on file     Minutes per session: Not on file    Stress: Not on file   Relationships    Social connections     Talks on phone: Not on file     Gets together: Not on file     Attends Uatsdin service: Not on file     Active member of club or organization: Not on file     Attends meetings of clubs or organizations: Not on file     Relationship status: Not on file    Intimate partner violence     Fear of current or ex partner: Not on file     Emotionally abused: Not on file     Physically abused: Not on file     Forced sexual activity: Not on file   Other Topics Concern    Not on file   Social History Narrative    Not on file     Family History   Problem Relation Age of Onset    Stroke Mother     Hypertension Mother     Hypertension Father     MS Father      Allergies   Allergen Reactions    Latex Itching and Swelling    Iodine Itching and Swelling     Prior to Admission Medications   Prescriptions Last Dose Informant Patient Reported? Taking? DULoxetine (CYMBALTA) 30 mg capsule   No No   Sig: Take 1 Cap by mouth daily. Starting dose days 1-7. DULoxetine (CYMBALTA) 60 mg capsule   No No   Sig: Take 1 Cap by mouth daily. Maintenance dose. FALMINA, 28, 0.1-20 mg-mcg tab   Yes No   acetaminophen (TYLENOL PO)   Yes No   Sig: Take  by mouth. aspirin (ASPIRIN) 325 mg tablet   Yes No   Sig: Take 81 mg by mouth daily. cortisone acetate (CORTISONE IM)   Yes No   Sig: by IntraMUSCular route. prenatal 35/ESDV fum/folic/dha (PRENATAL-1 PO)   Yes No   Sig: Take  by mouth.   promethazine (PHENERGAN) 12.5 mg tablet   Yes No   Sig: TAKE 1 TABLET BY MOUTH EVERY 6 HOURS AS NEEDED FOR NAUSEA   promethazine (PHENERGAN) 25 mg suppository   No No   Sig: Insert 1 Suppository into rectum every six (6) hours as needed for Nausea. Facility-Administered Medications: None         Vitals:  No data found. No data recorded.     I&O:  No intake/output data recorded. No intake/output data recorded. Exam:  Patient without distress. Abdomen soft, non-tender               Fundus soft and non tender               Perineum No sign of blood or amniotic fluid               Lower extremities edema 1+    Cervical Exam:  0 cm dilated    30% effaced    -3 station    Membranes:   Intact    Fetal Heart Rate: Reactive  Uterine Activity: None       EFW 92nd %ile today c/w 5lb 09PC baby  Cephalic presentation    Labs: No results found for this or any previous visit (from the past 24 hour(s)). Assessment and Plan:      1.  Elevated blood pressures c/w at least GHTN  - GBS collected in office  - monitor blood pressures; treat per hypertensive protocol as needed  - CBC, CMP, pr/cr  - will start 24hr urine as well  - BMZ for lung maturity  - iron bid although states she cannot tolerate at home-- may need to consider iron transfusion as hgb 7.5    Joy Ellis MD  [facility]

## 2020-06-08 NOTE — PROGRESS NOTES
[address] [place] [address] 07/27/20 admitted under triage for elevated BP's in the office. Pt has a hx of Pre eclampsia with her 3 prior pregnancies. Pt states she has a mild HA today. Denies any blurred vision or epigastric discomfort. 5:34 PM  Dr Woodson Osgood at the bedside to go over plan of care with the pt. Aware that 24 hoiur urine started at 1715.

## 2020-06-09 VITALS
WEIGHT: 154 LBS | TEMPERATURE: 98.2 F | SYSTOLIC BLOOD PRESSURE: 143 MMHG | RESPIRATION RATE: 16 BRPM | HEART RATE: 92 BPM | OXYGEN SATURATION: 99 % | BODY MASS INDEX: 28.34 KG/M2 | DIASTOLIC BLOOD PRESSURE: 84 MMHG | HEIGHT: 62 IN

## 2020-06-09 PROBLEM — Z34.93 PREGNANT AND NOT YET DELIVERED IN THIRD TRIMESTER: Status: ACTIVE | Noted: 2020-06-09

## 2020-06-09 PROBLEM — O13.9 GESTATIONAL HYPERTENSION: Status: ACTIVE | Noted: 2020-06-09

## 2020-06-09 LAB
COLLECT DURATION TIME UR: 24 HR
PROT 24H UR-MRATE: 299 MG/24HR
SPECIMEN VOL ?TM UR: 1300 ML

## 2020-06-09 PROCEDURE — 74011250636 HC RX REV CODE- 250/636: Performed by: STUDENT IN AN ORGANIZED HEALTH CARE EDUCATION/TRAINING PROGRAM

## 2020-06-09 PROCEDURE — 96372 THER/PROPH/DIAG INJ SC/IM: CPT

## 2020-06-09 PROCEDURE — 99218 HC RM OBSERVATION: CPT

## 2020-06-09 RX ADMIN — BETAMETHASONE SODIUM PHOSPHATE AND BETAMETHASONE ACETATE 12 MG: 3; 3 INJECTION, SUSPENSION INTRA-ARTICULAR; INTRALESIONAL; INTRAMUSCULAR at 17:43

## 2020-06-09 NOTE — PROGRESS NOTES
AntePartum Progress Note    Virgin Sheets  33w1d    Patient admitted for gestational hypertension 33w1d Estimated Date of Delivery: 20 states she has had a mild HA on and off. Vitals:    Patient Vitals for the past 24 hrs:   BP Temp Pulse Resp SpO2 Height Weight   20 0529 150/81 98 °F (36.7 °C) 96 14      20 1946 (!) 142/94 98 °F (36.7 °C) (!) 106 16 99 %     20 1801     99 %     20 1800 143/89  85       20 1756     98 %     20 1751     98 %     20 1746     97 %     20 1741     98 %     20 1736     98 %     20 1731   96  99 %     20 1730 140/86         20 1726     98 %     20 1721     99 %     20 1717  98.2 °F (36.8 °C)        20 1716 (!) 135/91  90  98 % 5' 2\" (1.575 m) 69.9 kg (154 lb)   20 1711     98 %     20 1706     99 %     20 1701 (!) 142/94  96  98 %       Temp (24hrs), Av.1 °F (36.7 °C), Min:98 °F (36.7 °C), Max:98.2 °F (36.8 °C)    NST:  pending  Uterine Activity: None    Exam:  Patient without distress.                Abdomen soft, non-tender               Fundus soft and non tender               Lower extremities edema +1                                            Labs:   Recent Results (from the past 24 hour(s))   CBC WITH AUTOMATED DIFF    Collection Time: 20  5:00 PM   Result Value Ref Range    WBC 7.8 3.6 - 11.0 K/uL    RBC 3.03 (L) 3.80 - 5.20 M/uL    HGB 7.5 (L) 11.5 - 16.0 g/dL    HCT 23.6 (L) 35.0 - 47.0 %    MCV 77.9 (L) 80.0 - 99.0 FL    MCH 24.8 (L) 26.0 - 34.0 PG    MCHC 31.8 30.0 - 36.5 g/dL    RDW 13.6 11.5 - 14.5 %    PLATELET 292 705 - 297 K/uL    MPV 9.8 8.9 - 12.9 FL    NRBC 0.0 0  WBC    ABSOLUTE NRBC 0.00 0.00 - 0.01 K/uL    NEUTROPHILS 71 32 - 75 %    LYMPHOCYTES 14 12 - 49 %    MONOCYTES 6 5 - 13 %    EOSINOPHILS 7 0 - 7 %    BASOPHILS 0 0 - 1 % IMMATURE GRANULOCYTES 2 (H) 0.0 - 0.5 %    ABS. NEUTROPHILS 5.6 1.8 - 8.0 K/UL    ABS. LYMPHOCYTES 1.1 0.8 - 3.5 K/UL    ABS. MONOCYTES 0.5 0.0 - 1.0 K/UL    ABS. EOSINOPHILS 0.6 (H) 0.0 - 0.4 K/UL    ABS. BASOPHILS 0.0 0.0 - 0.1 K/UL    ABS. IMM. GRANS. 0.1 (H) 0.00 - 0.04 K/UL    DF AUTOMATED     METABOLIC PANEL, COMPREHENSIVE    Collection Time: 20  5:00 PM   Result Value Ref Range    Sodium 139 136 - 145 mmol/L    Potassium 3.4 (L) 3.5 - 5.1 mmol/L    Chloride 109 (H) 97 - 108 mmol/L    CO2 21 21 - 32 mmol/L    Anion gap 9 5 - 15 mmol/L    Glucose 90 65 - 100 mg/dL    BUN 7 6 - 20 MG/DL    Creatinine 0.72 0.55 - 1.02 MG/DL    BUN/Creatinine ratio 10 (L) 12 - 20      GFR est AA >60 >60 ml/min/1.73m2    GFR est non-AA >60 >60 ml/min/1.73m2    Calcium 8.0 (L) 8.5 - 10.1 MG/DL    Bilirubin, total 0.2 0.2 - 1.0 MG/DL    ALT (SGPT) 11 (L) 12 - 78 U/L    AST (SGOT) 11 (L) 15 - 37 U/L    Alk. phosphatase 165 (H) 45 - 117 U/L    Protein, total 5.6 (L) 6.4 - 8.2 g/dL    Albumin 2.3 (L) 3.5 - 5.0 g/dL    Globulin 3.3 2.0 - 4.0 g/dL    A-G Ratio 0.7 (L) 1.1 - 2.2     URIC ACID    Collection Time: 20  5:00 PM   Result Value Ref Range    Uric acid 3.8 2.6 - 6.0 MG/DL   LD    Collection Time: 20  5:00 PM   Result Value Ref Range     81 - 246 U/L   PROTEIN/CREATININE RATIO, URINE    Collection Time: 20  5:00 PM   Result Value Ref Range    Protein, urine random 15 (H) 0.0 - 11.9 mg/dL    Creatinine, urine 62.00 mg/dL    Protein/Creat. urine Ratio 0.2         Assessment and Plan:   IUP @ 33w1d admitted for observation,  steroids due to Gestational HTN     BPs mild range  Labs WNL other than anemia which has been present throughout her pregnancy and likely worsening due to physiologic dilutional anemia of pregnancy, on oral iron supplement   2nd BMZ later today   24hr urine protein collection finishes at 1715 today     Dispo:  If BPs remain mild range, pt asymptomatic, pt may be discharged home tonight after completing 24hr urine protein collection and receiving 2nd dose of BMZ, recommend close f/u in the office in [address] [place] [address], DO

## 2020-06-09 NOTE — DISCHARGE INSTRUCTIONS
Patient Education     **KEEP NEXT APPOINTMENT AS SCHEDULED WITH DR Radha Marie**     High Blood Pressure in Pregnancy: Care Instructions  Your Care Instructions     High blood pressure (hypertension) means that the force of blood against your artery walls is too strong. Mild high blood pressure during pregnancy is not usually dangerous. Your doctor will probably just want to watch you closely. But when blood pressure is very high, it can reduce oxygen to your baby. This can affect how well your baby grows. High blood pressure also means that you are at higher risk for:  · Preeclampsia. This is a problem that includes high blood pressure and damage to your liver or kidneys. It can also reduce how much oxygen your baby gets. In some cases, it leads to eclampsia. Eclampsia causes seizures. · Placental abruption. This is a problem when the placenta separates from the uterus before birth. It prevents the baby from getting enough oxygen and nutrients. Sometimes it can cause death for the baby and the mother. To prevent problems for you or your baby, you will have to check your blood pressure often. You will do this until after your baby is born. If your blood pressure rises suddenly or is very high during your pregnancy, your doctor may prescribe medicines. They can usually control blood pressure. If your blood pressure affects your or your baby's health, your doctor may need to deliver your baby early. After your baby is born, your blood pressure will probably improve. But sometimes blood pressure problems continue after birth. Follow-up care is a key part of your treatment and safety. Be sure to make and go to all appointments, and call your doctor if you are having problems. It's also a good idea to know your test results and keep a list of the medicines you take. How can you care for yourself at home? · Take and write down your blood pressure at home if your doctor says to.   · Take your medicines exactly as prescribed. Call your doctor if you think you are having a problem with your medicine. · Do not smoke. This is one of the best things you can do to help your baby be healthy. If you need help quitting, talk to your doctor about stop-smoking programs and medicines. These can increase your chances of quitting for good. · Don't gain too much weight during pregnancy. Talk to your doctor about how much weight gain is healthy. · Eat a healthy diet. · If your doctor says it's okay, get regular exercise. Walking or swimming several times a week can be healthy for you and your baby. · Reduce stress, and find time to relax. This is very important if you continue to work or have a busy schedule. It's also important if you have small children at home. When should you call for help? Call 911 anytime you think you may need emergency care. For example, call if:  · You passed out (lost consciousness). · You have a seizure. Call your doctor now or seek immediate medical care if:  · You have symptoms of preeclampsia, such as:  ? Sudden swelling of your face, hands, or feet. ? New vision problems (such as dimness, blurring, or seeing spots). ? A severe headache. · Your blood pressure is higher than it should be or rises suddenly. · You have new nausea or vomiting. · You think that you are in labor. · You have pain in your belly or pelvis. Watch closely for changes in your health, and be sure to contact your doctor if:  · You gain weight rapidly. Where can you learn more? Go to http://huma-elías.info/  Enter A052 in the search box to learn more about \"High Blood Pressure in Pregnancy: Care Instructions. \"  Current as of: February 11, 2020               Content Version: 12.5  © 5917-6617 [employer]. Care instructions adapted under license by [employer] (which disclaims liability or warranty for this information).  If you have questions about a medical condition or this instruction, always ask your healthcare professional. [place] [address] any warranty or liability for your use of this information.

## 2020-06-09 NOTE — PROGRESS NOTES
7:09 AM  Bedside and Verbal shift change report given to Yaquelin Horan RN (oncoming nurse) by Boubacar Conteh RN (offgoing nurse). Report included the following information SBAR, MAR and Recent Results. 7:15 AM  Pt sleeping soundly, s/o asleep at bedside. Undisturbed at this time. 10:19 AM  Pt awake, assessment and NST completed. Pt denies c/o at this time, no headache this am.S/O at bedside. Continues with 24 hr urine. 1800:  Pt given d/c instructions, see copy attached. 24 urine collected and sent to lab. Pt left floor ambulatory with s/o in no distress.

## 2020-06-11 ENCOUNTER — HOSPITAL ENCOUNTER (INPATIENT)
Age: 30
LOS: 5 days | Discharge: HOME | DRG: 540 | End: 2020-06-16
Attending: STUDENT IN AN ORGANIZED HEALTH CARE EDUCATION/TRAINING PROGRAM | Admitting: STUDENT IN AN ORGANIZED HEALTH CARE EDUCATION/TRAINING PROGRAM
Payer: MEDICAID

## 2020-06-11 DIAGNOSIS — R10.9 POSTOPERATIVE ABDOMINAL PAIN: Primary | ICD-10-CM

## 2020-06-11 DIAGNOSIS — G89.18 POSTOPERATIVE ABDOMINAL PAIN: Primary | ICD-10-CM

## 2020-06-11 PROBLEM — O14.90 PREECLAMPSIA: Status: ACTIVE | Noted: 2020-06-11

## 2020-06-11 LAB
ALBUMIN SERPL-MCNC: 2.3 G/DL (ref 3.5–5)
ALBUMIN/GLOB SERPL: 0.7 {RATIO} (ref 1.1–2.2)
ALP SERPL-CCNC: 154 U/L (ref 45–117)
ALT SERPL-CCNC: 33 U/L (ref 12–78)
ANION GAP SERPL CALC-SCNC: 9 MMOL/L (ref 5–15)
AST SERPL-CCNC: 50 U/L (ref 15–37)
BASOPHILS # BLD: 0 K/UL (ref 0–0.1)
BASOPHILS NFR BLD: 0 % (ref 0–1)
BILIRUB SERPL-MCNC: 0.1 MG/DL (ref 0.2–1)
BUN SERPL-MCNC: 12 MG/DL (ref 6–20)
BUN/CREAT SERPL: 17 (ref 12–20)
CALCIUM SERPL-MCNC: 8.4 MG/DL (ref 8.5–10.1)
CHLORIDE SERPL-SCNC: 110 MMOL/L (ref 97–108)
CO2 SERPL-SCNC: 20 MMOL/L (ref 21–32)
CREAT SERPL-MCNC: 0.69 MG/DL (ref 0.55–1.02)
CREAT UR-MCNC: 42 MG/DL
DIFFERENTIAL METHOD BLD: ABNORMAL
EOSINOPHIL # BLD: 0.1 K/UL (ref 0–0.4)
EOSINOPHIL NFR BLD: 1 % (ref 0–7)
ERYTHROCYTE [DISTWIDTH] IN BLOOD BY AUTOMATED COUNT: 13.7 % (ref 11.5–14.5)
GLOBULIN SER CALC-MCNC: 3.3 G/DL (ref 2–4)
GLUCOSE SERPL-MCNC: 87 MG/DL (ref 65–100)
HCT VFR BLD AUTO: 22.4 % (ref 35–47)
HGB BLD-MCNC: 7 G/DL (ref 11.5–16)
IMM GRANULOCYTES # BLD AUTO: 0 K/UL
IMM GRANULOCYTES NFR BLD AUTO: 0 %
LDH SERPL L TO P-CCNC: 222 U/L (ref 81–246)
LYMPHOCYTES # BLD: 1.2 K/UL (ref 0.8–3.5)
LYMPHOCYTES NFR BLD: 11 % (ref 12–49)
MCH RBC QN AUTO: 24.5 PG (ref 26–34)
MCHC RBC AUTO-ENTMCNC: 31.3 G/DL (ref 30–36.5)
MCV RBC AUTO: 78.3 FL (ref 80–99)
METAMYELOCYTES NFR BLD MANUAL: 1 %
MONOCYTES # BLD: 0.6 K/UL (ref 0–1)
MONOCYTES NFR BLD: 6 % (ref 5–13)
MYELOCYTES NFR BLD MANUAL: 2 %
NEUTS SEG # BLD: 8.4 K/UL (ref 1.8–8)
NEUTS SEG NFR BLD: 79 % (ref 32–75)
NRBC # BLD: 0.12 K/UL (ref 0–0.01)
NRBC BLD-RTO: 1.1 PER 100 WBC
PLATELET # BLD AUTO: 300 K/UL (ref 150–400)
PMV BLD AUTO: 10.3 FL (ref 8.9–12.9)
POTASSIUM SERPL-SCNC: 3.9 MMOL/L (ref 3.5–5.1)
PROT SERPL-MCNC: 5.6 G/DL (ref 6.4–8.2)
PROT UR-MCNC: 25 MG/DL (ref 0–11.9)
PROT/CREAT UR-RTO: 0.6
RBC # BLD AUTO: 2.86 M/UL (ref 3.8–5.2)
RBC MORPH BLD: ABNORMAL
SODIUM SERPL-SCNC: 139 MMOL/L (ref 136–145)
URATE SERPL-MCNC: 3.6 MG/DL (ref 2.6–6)
WBC # BLD AUTO: 10.6 K/UL (ref 3.6–11)

## 2020-06-11 PROCEDURE — 59200 INSERT CERVICAL DILATOR: CPT | Performed by: OBSTETRICS & GYNECOLOGY

## 2020-06-11 PROCEDURE — 86920 COMPATIBILITY TEST SPIN: CPT

## 2020-06-11 PROCEDURE — 30233N1 TRANSFUSION OF NONAUTOLOGOUS RED BLOOD CELLS INTO PERIPHERAL VEIN, PERCUTANEOUS APPROACH: ICD-10-PCS | Performed by: STUDENT IN AN ORGANIZED HEALTH CARE EDUCATION/TRAINING PROGRAM

## 2020-06-11 PROCEDURE — 86870 RBC ANTIBODY IDENTIFICATION: CPT

## 2020-06-11 PROCEDURE — 77010026065 HC OXYGEN MINIMUM MEDICAL AIR: Performed by: OBSTETRICS & GYNECOLOGY

## 2020-06-11 PROCEDURE — 86900 BLOOD TYPING SEROLOGIC ABO: CPT

## 2020-06-11 PROCEDURE — 84156 ASSAY OF PROTEIN URINE: CPT

## 2020-06-11 PROCEDURE — 75410000002 HC LABOR FEE PER 1 HR: Performed by: OBSTETRICS & GYNECOLOGY

## 2020-06-11 PROCEDURE — 74011250637 HC RX REV CODE- 250/637: Performed by: STUDENT IN AN ORGANIZED HEALTH CARE EDUCATION/TRAINING PROGRAM

## 2020-06-11 PROCEDURE — 85025 COMPLETE CBC W/AUTO DIFF WBC: CPT

## 2020-06-11 PROCEDURE — 36415 COLL VENOUS BLD VENIPUNCTURE: CPT

## 2020-06-11 PROCEDURE — 74011250636 HC RX REV CODE- 250/636

## 2020-06-11 PROCEDURE — 74011250636 HC RX REV CODE- 250/636: Performed by: STUDENT IN AN ORGANIZED HEALTH CARE EDUCATION/TRAINING PROGRAM

## 2020-06-11 PROCEDURE — 84550 ASSAY OF BLOOD/URIC ACID: CPT

## 2020-06-11 PROCEDURE — 80053 COMPREHEN METABOLIC PANEL: CPT

## 2020-06-11 PROCEDURE — 83615 LACTATE (LD) (LDH) ENZYME: CPT

## 2020-06-11 PROCEDURE — 65270000029 HC RM PRIVATE

## 2020-06-11 RX ORDER — LABETALOL HCL 20 MG/4 ML
40 SYRINGE (ML) INTRAVENOUS
Status: COMPLETED | OUTPATIENT
Start: 2020-06-11 | End: 2020-06-11

## 2020-06-11 RX ORDER — BUTORPHANOL TARTRATE 2 MG/ML
1 INJECTION INTRAMUSCULAR; INTRAVENOUS
Status: DISCONTINUED | OUTPATIENT
Start: 2020-06-11 | End: 2020-06-16 | Stop reason: HOSPADM

## 2020-06-11 RX ORDER — LABETALOL HCL 20 MG/4 ML
20 SYRINGE (ML) INTRAVENOUS
Status: COMPLETED | OUTPATIENT
Start: 2020-06-11 | End: 2020-06-11

## 2020-06-11 RX ORDER — SODIUM CHLORIDE, SODIUM LACTATE, POTASSIUM CHLORIDE, CALCIUM CHLORIDE 600; 310; 30; 20 MG/100ML; MG/100ML; MG/100ML; MG/100ML
75 INJECTION, SOLUTION INTRAVENOUS CONTINUOUS
Status: DISCONTINUED | OUTPATIENT
Start: 2020-06-11 | End: 2020-06-14

## 2020-06-11 RX ORDER — MAGNESIUM SULFATE HEPTAHYDRATE 40 MG/ML
2 INJECTION, SOLUTION INTRAVENOUS ONCE
Status: DISCONTINUED | OUTPATIENT
Start: 2020-06-11 | End: 2020-06-11

## 2020-06-11 RX ORDER — HYDRALAZINE HYDROCHLORIDE 20 MG/ML
20 INJECTION INTRAMUSCULAR; INTRAVENOUS ONCE
Status: COMPLETED | OUTPATIENT
Start: 2020-06-11 | End: 2020-06-11

## 2020-06-11 RX ORDER — MAGNESIUM SULFATE HEPTAHYDRATE 40 MG/ML
INJECTION, SOLUTION INTRAVENOUS
Status: DISCONTINUED
Start: 2020-06-11 | End: 2020-06-11

## 2020-06-11 RX ORDER — OXYTOCIN/0.9 % SODIUM CHLORIDE 30/500 ML
2 PLASTIC BAG, INJECTION (ML) INTRAVENOUS
Status: DISCONTINUED | OUTPATIENT
Start: 2020-06-12 | End: 2020-06-14

## 2020-06-11 RX ORDER — LABETALOL HCL 20 MG/4 ML
80 SYRINGE (ML) INTRAVENOUS
Status: DISCONTINUED | OUTPATIENT
Start: 2020-06-11 | End: 2020-06-16 | Stop reason: HOSPADM

## 2020-06-11 RX ORDER — MAGNESIUM SULFATE HEPTAHYDRATE 40 MG/ML
2 INJECTION, SOLUTION INTRAVENOUS CONTINUOUS
Status: DISCONTINUED | OUTPATIENT
Start: 2020-06-11 | End: 2020-06-11

## 2020-06-11 RX ORDER — MAGNESIUM SULFATE HEPTAHYDRATE 40 MG/ML
4 INJECTION, SOLUTION INTRAVENOUS ONCE
Status: COMPLETED | OUTPATIENT
Start: 2020-06-11 | End: 2020-06-11

## 2020-06-11 RX ORDER — MAGNESIUM SULFATE HEPTAHYDRATE 40 MG/ML
INJECTION, SOLUTION INTRAVENOUS
Status: COMPLETED
Start: 2020-06-11 | End: 2020-06-11

## 2020-06-11 RX ORDER — ACETAMINOPHEN 325 MG/1
650 TABLET ORAL
Status: DISCONTINUED | OUTPATIENT
Start: 2020-06-11 | End: 2020-06-16 | Stop reason: HOSPADM

## 2020-06-11 RX ORDER — HYDRALAZINE HYDROCHLORIDE 20 MG/ML
10 INJECTION INTRAMUSCULAR; INTRAVENOUS ONCE
Status: COMPLETED | OUTPATIENT
Start: 2020-06-11 | End: 2020-06-11

## 2020-06-11 RX ORDER — MAGNESIUM SULFATE HEPTAHYDRATE 40 MG/ML
1 INJECTION, SOLUTION INTRAVENOUS CONTINUOUS
Status: DISCONTINUED | OUTPATIENT
Start: 2020-06-11 | End: 2020-06-14

## 2020-06-11 RX ORDER — DIPHENHYDRAMINE HYDROCHLORIDE 50 MG/ML
25 INJECTION, SOLUTION INTRAMUSCULAR; INTRAVENOUS
Status: DISCONTINUED | OUTPATIENT
Start: 2020-06-11 | End: 2020-06-16 | Stop reason: HOSPADM

## 2020-06-11 RX ORDER — HYDRALAZINE HYDROCHLORIDE 20 MG/ML
INJECTION INTRAMUSCULAR; INTRAVENOUS
Status: COMPLETED
Start: 2020-06-11 | End: 2020-06-11

## 2020-06-11 RX ORDER — CALCIUM GLUCONATE 20 MG/ML
1 INJECTION, SOLUTION INTRAVENOUS AS NEEDED
Status: DISCONTINUED | OUTPATIENT
Start: 2020-06-11 | End: 2020-06-16 | Stop reason: HOSPADM

## 2020-06-11 RX ADMIN — HYDRALAZINE HYDROCHLORIDE 10 MG: 20 INJECTION INTRAMUSCULAR; INTRAVENOUS at 13:26

## 2020-06-11 RX ADMIN — MAGNESIUM SULFATE IN WATER 2 G/HR: 40 INJECTION, SOLUTION INTRAVENOUS at 14:14

## 2020-06-11 RX ADMIN — HYDRALAZINE HYDROCHLORIDE 10 MG: 20 INJECTION INTRAMUSCULAR; INTRAVENOUS at 11:59

## 2020-06-11 RX ADMIN — MAGNESIUM SULFATE HEPTAHYDRATE 4 G: 40 INJECTION, SOLUTION INTRAVENOUS at 13:50

## 2020-06-11 RX ADMIN — LABETALOL HYDROCHLORIDE 20 MG: 5 INJECTION, SOLUTION INTRAVENOUS at 11:21

## 2020-06-11 RX ADMIN — MISOPROSTOL 25 MCG: 100 TABLET ORAL at 18:32

## 2020-06-11 RX ADMIN — LABETALOL HYDROCHLORIDE 80 MG: 5 INJECTION, SOLUTION INTRAVENOUS at 11:44

## 2020-06-11 RX ADMIN — MAGNESIUM SULFATE IN WATER 2 G/HR: 40 INJECTION, SOLUTION INTRAVENOUS at 22:20

## 2020-06-11 RX ADMIN — SODIUM CHLORIDE, SODIUM LACTATE, POTASSIUM CHLORIDE, AND CALCIUM CHLORIDE 75 ML/HR: 600; 310; 30; 20 INJECTION, SOLUTION INTRAVENOUS at 14:00

## 2020-06-11 RX ADMIN — LABETALOL HYDROCHLORIDE 40 MG: 5 INJECTION, SOLUTION INTRAVENOUS at 11:32

## 2020-06-11 RX ADMIN — MISOPROSTOL 25 MCG: 100 TABLET ORAL at 22:17

## 2020-06-11 NOTE — PROGRESS NOTES
1115- Received report from [facility] and assumed care of pt. Pt arrived to L&D due to elevated BP at home. Reports vision changes as well. 1118- Confirmed with Isa Henry RN who took call from MD that Dr Bryanna cMcauley ordered Labetalol protocol. 1121- 20 mg IVP Labetalol per HTN/Labetolol protocol     1133- 40 mg IVP  Labetalol per protocol    1143-80 mg IVP Labetalol per protocol    1159 10 mg IV Hydralazine per protocol. 1221 Discussed BP and plan of care with MD Merlin Espy. Will plan on MFM in AM since her BP responded to meds. If she has increased BP here today will potentially start Magnesium and induction. 1321 BP discussed with MD oMrgan,     Will repeat Hydralazine, start magnesium, and pt will be induced at this time. Notified Charge RN.    1326  10 mg IV Hydralazine per order. 1335 Moved pt from room 15 to LR 5 for induction of labor    1350 Mag bolus started per MD order    1410 Mag infusion started at 2gram/hour. Pt tolerating well.        1750 MD Morgan at bedside - attempted to place cook catheter. Unable to place. Will proceed with cytotec. [facility] placed 25 mcg intravaginal cytotec. Pt tolerated well.    7143 Pt with decel/variable that resolved spontaneously. NRB applied, turned to side. MD Merlin Espy was on unit and also came to room. Plan to hold miso at 0200 and continue to monitor. 231  Bedside shift change report given to SARA Downs (oncoming nurse) by RAKEHS Patel RN (offgoing nurse). Report included the following information SBAR, Kardex, Intake/Output, MAR and Recent Results.

## 2020-06-11 NOTE — H&P
History & Physical    Name: Ector Cardoza MRN: [id number]  SSN: [id number]    YOB: 1990  Age: 31 y.o. Sex: female        Subjective:     Estimated Date of Delivery: 20  OB History        15    Para   3    Term   3            AB   9    Living   3       SAB   9    TAB        Ectopic        Molar        Multiple        Live Births   3                MsGreg Perez is admitted with pregnancy at 33w3d for elevated blood pressures in the office and decreased fetal movement. She was hospitalized earlier in the week with elevated BPs and ruled out for Pre-E just barely with 24hr urine protein of 299mg. She received BMZ x 2. . Prenatal course was complicated by anemia and HTN . Please see prenatal records for details. She is anemic     Past Medical History:   Diagnosis Date    Abnormal Papanicolaou smear of cervix     Anemia     Bipolar disorder ([facility] [address]     Hypertension in pregnancy     pre-E with prior pregnancy    Ill-defined condition 2019    right shoulder injury/pain    Kidney disease     kidney stones    Postpartum depression     PTSD (post-traumatic stress disorder)     Trauma      Past Surgical History:   Procedure Laterality Date    HX APPENDECTOMY      HX LEEP PROCEDURE  2019    HX UROLOGICAL      kidney stones     Social History     Occupational History    Not on file   Tobacco Use    Smoking status: Never Smoker    Smokeless tobacco: Never Used   Substance and Sexual Activity    Alcohol use: Not Currently     Frequency: Never    Drug use: Never    Sexual activity: Yes     Family History   Problem Relation Age of Onset    Stroke Mother     Hypertension Mother     Hypertension Father     MS Father        Allergies   Allergen Reactions    Latex Itching and Swelling    Iodine Itching and Swelling     Prior to Admission medications    Medication Sig Start Date End Date Taking?  Authorizing Provider   aspirin (ASPIRIN) 325 mg tablet Take 81 mg by mouth daily. Provider, Historical   prenatal 79/KZDW fum/folic/dha (PRENATAL-1 PO) Take  by mouth. Provider, Historical   promethazine (PHENERGAN) 12.5 mg tablet TAKE 1 TABLET BY MOUTH EVERY 6 HOURS AS NEEDED FOR NAUSEA 12/24/19   Other, MD Jerry   promethazine (PHENERGAN) 25 mg suppository Insert 1 Suppository into rectum every six (6) hours as needed for Nausea. 1/5/20   Eve Alpers, MD   DULoxetine (CYMBALTA) 30 mg capsule Take 1 Cap by mouth daily. Starting dose days 1-7. 7/15/19   Jo Gee NP   DULoxetine (CYMBALTA) 60 mg capsule Take 1 Cap by mouth daily. Maintenance dose. 7/15/19   Jo Gee NP   cortisone acetate (CORTISONE IM) by IntraMUSCular route. Provider, Historical   FALMINA, 28, 0.1-20 mg-mcg tab  4/10/19   Provider, Historical   acetaminophen (TYLENOL PO) Take  by mouth. Provider, Historical        Review of Systems: A comprehensive review of systems was negative except for that written in the HPI. Objective:     Visit Vitals  /85   Pulse 76   Resp 20   LMP 07/02/2019         Physical Exam:  Patient without distress.   Heart: Regular rate and rhythm  Lung: clear to auscultation throughout lung fields, no wheezes, no rales, no rhonchi and normal respiratory effort  Abdomen: soft, nontender  Lower Extremities:  - Edema 1+  Membranes:  Intact  Fetal Heart Rate: Reactive  Baseline: 145 per minute  Variability: moderate  Accelerations: no  Decelerations: none  Uterine contractions: none    Prenatal Labs:   Lab Results   Component Value Date/Time    Rubella, External Immune 12/18/2019    HBsAg, External Negative 12/18/2019    HIV, External Negative 12/18/2019    RPR, External non-reactive 12/18/2019    Gonorrhea, External Negative 12/18/2019    Chlamydia, External Negative 12/18/2019     Recent Results (from the past 12 hour(s))   CBC WITH AUTOMATED DIFF    Collection Time: 06/11/20 11:10 AM   Result Value Ref Range    WBC 10.6 3.6 - 11.0 K/uL    RBC 2.86 (L) 3.80 - 5.20 M/uL    HGB 7.0 (L) 11.5 - 16.0 g/dL    HCT 22.4 (L) 35.0 - 47.0 %    MCV 78.3 (L) 80.0 - 99.0 FL    MCH 24.5 (L) 26.0 - 34.0 PG    MCHC 31.3 30.0 - 36.5 g/dL    RDW 13.7 11.5 - 14.5 %    PLATELET 695 218 - 765 K/uL    MPV 10.3 8.9 - 12.9 FL    NRBC 1.1 (H) 0  WBC    ABSOLUTE NRBC 0.12 (H) 0.00 - 0.01 K/uL    NEUTROPHILS 79 (H) 32 - 75 %    LYMPHOCYTES 11 (L) 12 - 49 %    MONOCYTES 6 5 - 13 %    EOSINOPHILS 1 0 - 7 %    BASOPHILS 0 0 - 1 %    METAMYELOCYTES 1 (H) 0 %    MYELOCYTES 2 (H) 0 %    IMMATURE GRANULOCYTES 0 %    ABS. NEUTROPHILS 8.4 (H) 1.8 - 8.0 K/UL    ABS. LYMPHOCYTES 1.2 0.8 - 3.5 K/UL    ABS. MONOCYTES 0.6 0.0 - 1.0 K/UL    ABS. EOSINOPHILS 0.1 0.0 - 0.4 K/UL    ABS. BASOPHILS 0.0 0.0 - 0.1 K/UL    ABS. IMM. GRANS. 0.0 K/UL    DF MANUAL      RBC COMMENTS NORMOCYTIC, NORMOCHROMIC     METABOLIC PANEL, COMPREHENSIVE    Collection Time: 06/11/20 11:10 AM   Result Value Ref Range    Sodium 139 136 - 145 mmol/L    Potassium 3.9 3.5 - 5.1 mmol/L    Chloride 110 (H) 97 - 108 mmol/L    CO2 20 (L) 21 - 32 mmol/L    Anion gap 9 5 - 15 mmol/L    Glucose 87 65 - 100 mg/dL    BUN 12 6 - 20 MG/DL    Creatinine 0.69 0.55 - 1.02 MG/DL    BUN/Creatinine ratio 17 12 - 20      GFR est AA >60 >60 ml/min/1.73m2    GFR est non-AA >60 >60 ml/min/1.73m2    Calcium 8.4 (L) 8.5 - 10.1 MG/DL    Bilirubin, total 0.1 (L) 0.2 - 1.0 MG/DL    ALT (SGPT) 33 12 - 78 U/L    AST (SGOT) 50 (H) 15 - 37 U/L    Alk.  phosphatase 154 (H) 45 - 117 U/L    Protein, total 5.6 (L) 6.4 - 8.2 g/dL    Albumin 2.3 (L) 3.5 - 5.0 g/dL    Globulin 3.3 2.0 - 4.0 g/dL    A-G Ratio 0.7 (L) 1.1 - 2.2     LD    Collection Time: 06/11/20 11:10 AM   Result Value Ref Range     81 - 246 U/L   URIC ACID    Collection Time: 06/11/20 11:10 AM   Result Value Ref Range    Uric acid 3.6 2.6 - 6.0 MG/DL   PROTEIN/CREATININE RATIO, URINE    Collection Time: 06/11/20 11:10 AM   Result Value Ref Range    Protein, urine random 25 (H) 0.0 - 11.9 mg/dL Creatinine, urine 42.00 mg/dL    Protein/Creat. urine Ratio 0.6           Assessment/Plan:   29yo [id number] at 33w 3d with worsening BPs, previously diagnosed with GHTN, now with severe range BPs and Pr:Cr 0.6 c/w diagnosis of Pre-E with severe features. Plan:   Severe range BPs -treated with labetalol x 3 doses and Hydralazine 1 dose and BPs responded well and now normotensive to mild range. Will continue to monitor closely. If requires antihypertensive treatment again will start Magnesium and move forward with IOL. If remains mild range will try to get her to 34w0d. With continued inpatient close monitoring.    GBS was sent in the office on 6/8 -will follow up on results   Prematurity -s/p BMZ completed on 6/9   Will ask M to see her tomorrow     Signed By:  Saundra Sinha DO     June 11, 2020

## 2020-06-11 NOTE — PROGRESS NOTES
Progress Note  Patient seen, fetal heart rate and contraction pattern evaluated at 1745. Resting comfortably, feeling some cramping.      Physical Exam:  Vitals:   Vitals:    06/11/20 1646 06/11/20 1647 06/11/20 1701 06/11/20 1702   BP: 134/55  135/57    Pulse: (!) 120  (!) 118    Resp:       Temp:       SpO2:  97%  97%   Weight:       Height:             Cervical Exam was examined   Closed/difficult to assess effacement/high     Uterine Activity[de-identified] irritability   Fetal Heart Rate: Reactive  Baseline: 145 per minute  Variability: moderate  Accelerations: yes  Decelerations: none  Pitocin: N/A     Assessment/Plan:  Ms. Kala Bowman is admitted with pregnancy at 33w3d with Pre-E with severe features     Mag for seizure PPx   Maternal and fetal surveillance are reassuring   Unable to place [facility] balloon to due soft/short/closed cervix (pt has had LEEP since her last delivery) cervical ripening with misoprostol 25mcg Q4H, first dose vaginally, subsequent doses buccally   Pitocin in AM at 0600  Repeat CBC/CMP in IVY Sinha DO  6/11/2020  6:04 PM

## 2020-06-12 ENCOUNTER — ANESTHESIA (OUTPATIENT)
Dept: LABOR AND DELIVERY | Age: 30
DRG: 540 | End: 2020-06-12
Payer: MEDICAID

## 2020-06-12 ENCOUNTER — APPOINTMENT (OUTPATIENT)
Dept: CT IMAGING | Age: 30
DRG: 540 | End: 2020-06-12
Attending: OBSTETRICS & GYNECOLOGY
Payer: MEDICAID

## 2020-06-12 ENCOUNTER — ANESTHESIA EVENT (OUTPATIENT)
Dept: LABOR AND DELIVERY | Age: 30
DRG: 540 | End: 2020-06-12
Payer: MEDICAID

## 2020-06-12 LAB
ALBUMIN SERPL-MCNC: 2 G/DL (ref 3.5–5)
ALBUMIN/GLOB SERPL: 0.6 {RATIO} (ref 1.1–2.2)
ALP SERPL-CCNC: 145 U/L (ref 45–117)
ALT SERPL-CCNC: 30 U/L (ref 12–78)
ANION GAP SERPL CALC-SCNC: 9 MMOL/L (ref 5–15)
AST SERPL-CCNC: 43 U/L (ref 15–37)
BASOPHILS # BLD: 0 K/UL (ref 0–0.1)
BASOPHILS NFR BLD: 0 % (ref 0–1)
BILIRUB SERPL-MCNC: 0.4 MG/DL (ref 0.2–1)
BUN SERPL-MCNC: 10 MG/DL (ref 6–20)
BUN/CREAT SERPL: 11 (ref 12–20)
CALCIUM SERPL-MCNC: 6.5 MG/DL (ref 8.5–10.1)
CHLORIDE SERPL-SCNC: 107 MMOL/L (ref 97–108)
CO2 SERPL-SCNC: 20 MMOL/L (ref 21–32)
COVID-19 RAPID TEST, COVR: NOT DETECTED
CREAT SERPL-MCNC: 0.87 MG/DL (ref 0.55–1.02)
D DIMER PPP FEU-MCNC: 2.78 MG/L FEU (ref 0–0.65)
DIFFERENTIAL METHOD BLD: ABNORMAL
EOSINOPHIL # BLD: 0 K/UL (ref 0–0.4)
EOSINOPHIL NFR BLD: 0 % (ref 0–7)
ERYTHROCYTE [DISTWIDTH] IN BLOOD BY AUTOMATED COUNT: 14.1 % (ref 11.5–14.5)
FIBRINOGEN PPP-MCNC: 428 MG/DL (ref 200–475)
GLOBULIN SER CALC-MCNC: 3.6 G/DL (ref 2–4)
GLUCOSE SERPL-MCNC: 106 MG/DL (ref 65–100)
HCT VFR BLD AUTO: 19.6 % (ref 35–47)
HCT VFR BLD AUTO: 22.6 % (ref 35–47)
HGB BLD-MCNC: 6.1 G/DL (ref 11.5–16)
HGB BLD-MCNC: 7.1 G/DL (ref 11.5–16)
IMM GRANULOCYTES # BLD AUTO: 0 K/UL
IMM GRANULOCYTES NFR BLD AUTO: 0 %
LYMPHOCYTES # BLD: 0.6 K/UL (ref 0.8–3.5)
LYMPHOCYTES NFR BLD: 4 % (ref 12–49)
MAGNESIUM SERPL-MCNC: 4.8 MG/DL (ref 1.6–2.4)
MCH RBC QN AUTO: 24.2 PG (ref 26–34)
MCHC RBC AUTO-ENTMCNC: 31.4 G/DL (ref 30–36.5)
MCV RBC AUTO: 77.1 FL (ref 80–99)
MONOCYTES # BLD: 0.1 K/UL (ref 0–1)
MONOCYTES NFR BLD: 1 % (ref 5–13)
NEUTS BAND NFR BLD MANUAL: 9 % (ref 0–6)
NEUTS SEG # BLD: 13.7 K/UL (ref 1.8–8)
NEUTS SEG NFR BLD: 86 % (ref 32–75)
NRBC # BLD: 0.07 K/UL (ref 0–0.01)
NRBC BLD-RTO: 0.5 PER 100 WBC
PLATELET # BLD AUTO: 297 K/UL (ref 150–400)
PMV BLD AUTO: 10 FL (ref 8.9–12.9)
POTASSIUM SERPL-SCNC: 3.4 MMOL/L (ref 3.5–5.1)
PROT SERPL-MCNC: 5.6 G/DL (ref 6.4–8.2)
RBC # BLD AUTO: 2.93 M/UL (ref 3.8–5.2)
RBC MORPH BLD: ABNORMAL
SARS-COV-2, COV2: NOT DETECTED
SODIUM SERPL-SCNC: 136 MMOL/L (ref 136–145)
SOURCE, COVRS: NORMAL
SPECIMEN SOURCE, FCOV2M: NORMAL
SPECIMEN SOURCE, FCOV2M: NORMAL
WBC # BLD AUTO: 14.4 K/UL (ref 3.6–11)

## 2020-06-12 PROCEDURE — 74011250636 HC RX REV CODE- 250/636: Performed by: ANESTHESIOLOGY

## 2020-06-12 PROCEDURE — 87635 SARS-COV-2 COVID-19 AMP PRB: CPT

## 2020-06-12 PROCEDURE — 86921 COMPATIBILITY TEST INCUBATE: CPT

## 2020-06-12 PROCEDURE — 74011250637 HC RX REV CODE- 250/637: Performed by: OBSTETRICS & GYNECOLOGY

## 2020-06-12 PROCEDURE — 36430 TRANSFUSION BLD/BLD COMPNT: CPT

## 2020-06-12 PROCEDURE — 36415 COLL VENOUS BLD VENIPUNCTURE: CPT

## 2020-06-12 PROCEDURE — 74011250636 HC RX REV CODE- 250/636: Performed by: STUDENT IN AN ORGANIZED HEALTH CARE EDUCATION/TRAINING PROGRAM

## 2020-06-12 PROCEDURE — 65270000029 HC RM PRIVATE

## 2020-06-12 PROCEDURE — 74011250636 HC RX REV CODE- 250/636: Performed by: OBSTETRICS & GYNECOLOGY

## 2020-06-12 PROCEDURE — 75410000003 HC RECOV DEL/VAG/CSECN EA 0.5 HR: Performed by: OBSTETRICS & GYNECOLOGY

## 2020-06-12 PROCEDURE — 80053 COMPREHEN METABOLIC PANEL: CPT

## 2020-06-12 PROCEDURE — 85018 HEMOGLOBIN: CPT

## 2020-06-12 PROCEDURE — 85025 COMPLETE CBC W/AUTO DIFF WBC: CPT

## 2020-06-12 PROCEDURE — 76010000391 HC C SECN FIRST 1 HR: Performed by: OBSTETRICS & GYNECOLOGY

## 2020-06-12 PROCEDURE — 75410000002 HC LABOR FEE PER 1 HR: Performed by: OBSTETRICS & GYNECOLOGY

## 2020-06-12 PROCEDURE — 85379 FIBRIN DEGRADATION QUANT: CPT

## 2020-06-12 PROCEDURE — 74011636320 HC RX REV CODE- 636/320: Performed by: STUDENT IN AN ORGANIZED HEALTH CARE EDUCATION/TRAINING PROGRAM

## 2020-06-12 PROCEDURE — 74011000250 HC RX REV CODE- 250: Performed by: ANESTHESIOLOGY

## 2020-06-12 PROCEDURE — 71275 CT ANGIOGRAPHY CHEST: CPT

## 2020-06-12 PROCEDURE — 77030007866 HC KT SPN ANES BBMI -B: Performed by: ANESTHESIOLOGY

## 2020-06-12 PROCEDURE — 83735 ASSAY OF MAGNESIUM: CPT

## 2020-06-12 PROCEDURE — 76010000392 HC C SECN EA ADDL 0.5 HR: Performed by: OBSTETRICS & GYNECOLOGY

## 2020-06-12 PROCEDURE — 85384 FIBRINOGEN ACTIVITY: CPT

## 2020-06-12 PROCEDURE — 76060000078 HC EPIDURAL ANESTHESIA: Performed by: OBSTETRICS & GYNECOLOGY

## 2020-06-12 PROCEDURE — P9016 RBC LEUKOCYTES REDUCED: HCPCS

## 2020-06-12 PROCEDURE — 86922 COMPATIBILITY TEST ANTIGLOB: CPT

## 2020-06-12 RX ORDER — SODIUM CHLORIDE 9 MG/ML
250 INJECTION, SOLUTION INTRAVENOUS AS NEEDED
Status: DISPENSED | OUTPATIENT
Start: 2020-06-12 | End: 2020-06-13

## 2020-06-12 RX ORDER — CEFAZOLIN SODIUM 1 G/3ML
INJECTION, POWDER, FOR SOLUTION INTRAMUSCULAR; INTRAVENOUS
Status: COMPLETED
Start: 2020-06-12 | End: 2020-06-12

## 2020-06-12 RX ORDER — FUROSEMIDE 10 MG/ML
20 INJECTION INTRAMUSCULAR; INTRAVENOUS ONCE
Status: COMPLETED | OUTPATIENT
Start: 2020-06-12 | End: 2020-06-12

## 2020-06-12 RX ORDER — DIPHENHYDRAMINE HYDROCHLORIDE 50 MG/ML
12.5 INJECTION, SOLUTION INTRAMUSCULAR; INTRAVENOUS
Status: ACTIVE | OUTPATIENT
Start: 2020-06-12 | End: 2020-06-13

## 2020-06-12 RX ORDER — CEFAZOLIN SODIUM 1 G/3ML
INJECTION, POWDER, FOR SOLUTION INTRAMUSCULAR; INTRAVENOUS AS NEEDED
Status: DISCONTINUED | OUTPATIENT
Start: 2020-06-12 | End: 2020-06-12 | Stop reason: HOSPADM

## 2020-06-12 RX ORDER — OXYCODONE HYDROCHLORIDE 5 MG/1
10 TABLET ORAL
Status: ACTIVE | OUTPATIENT
Start: 2020-06-12 | End: 2020-06-13

## 2020-06-12 RX ORDER — OXYCODONE AND ACETAMINOPHEN 5; 325 MG/1; MG/1
1 TABLET ORAL
Status: DISCONTINUED | OUTPATIENT
Start: 2020-06-12 | End: 2020-06-16 | Stop reason: HOSPADM

## 2020-06-12 RX ORDER — SODIUM CHLORIDE 0.9 % (FLUSH) 0.9 %
5-40 SYRINGE (ML) INJECTION AS NEEDED
Status: DISCONTINUED | OUTPATIENT
Start: 2020-06-12 | End: 2020-06-14

## 2020-06-12 RX ORDER — NALBUPHINE HYDROCHLORIDE 10 MG/ML
10 INJECTION, SOLUTION INTRAMUSCULAR; INTRAVENOUS; SUBCUTANEOUS
Status: DISCONTINUED | OUTPATIENT
Start: 2020-06-12 | End: 2020-06-13 | Stop reason: RX

## 2020-06-12 RX ORDER — ONDANSETRON 2 MG/ML
4 INJECTION INTRAMUSCULAR; INTRAVENOUS
Status: DISCONTINUED | OUTPATIENT
Start: 2020-06-12 | End: 2020-06-16 | Stop reason: HOSPADM

## 2020-06-12 RX ORDER — OXYTOCIN 10 [USP'U]/ML
INJECTION, SOLUTION INTRAMUSCULAR; INTRAVENOUS AS NEEDED
Status: DISCONTINUED | OUTPATIENT
Start: 2020-06-12 | End: 2020-06-12 | Stop reason: HOSPADM

## 2020-06-12 RX ORDER — KETOROLAC TROMETHAMINE 30 MG/ML
30 INJECTION, SOLUTION INTRAMUSCULAR; INTRAVENOUS
Status: DISPENSED | OUTPATIENT
Start: 2020-06-12 | End: 2020-06-13

## 2020-06-12 RX ORDER — NALOXONE HYDROCHLORIDE 0.4 MG/ML
0.2 INJECTION, SOLUTION INTRAMUSCULAR; INTRAVENOUS; SUBCUTANEOUS
Status: ACTIVE | OUTPATIENT
Start: 2020-06-12 | End: 2020-06-13

## 2020-06-12 RX ORDER — SWAB
1 SWAB, NON-MEDICATED MISCELLANEOUS DAILY
Status: DISCONTINUED | OUTPATIENT
Start: 2020-06-12 | End: 2020-06-16 | Stop reason: HOSPADM

## 2020-06-12 RX ORDER — SIMETHICONE 80 MG
80 TABLET,CHEWABLE ORAL AS NEEDED
Status: DISCONTINUED | OUTPATIENT
Start: 2020-06-12 | End: 2020-06-16 | Stop reason: HOSPADM

## 2020-06-12 RX ORDER — HYDRALAZINE HYDROCHLORIDE 20 MG/ML
INJECTION INTRAMUSCULAR; INTRAVENOUS
Status: DISPENSED
Start: 2020-06-12 | End: 2020-06-12

## 2020-06-12 RX ORDER — SODIUM CHLORIDE 0.9 % (FLUSH) 0.9 %
5-40 SYRINGE (ML) INJECTION EVERY 8 HOURS
Status: DISCONTINUED | OUTPATIENT
Start: 2020-06-12 | End: 2020-06-14

## 2020-06-12 RX ORDER — ONDANSETRON 2 MG/ML
INJECTION INTRAMUSCULAR; INTRAVENOUS AS NEEDED
Status: DISCONTINUED | OUTPATIENT
Start: 2020-06-12 | End: 2020-06-12 | Stop reason: HOSPADM

## 2020-06-12 RX ORDER — NALOXONE HYDROCHLORIDE 0.4 MG/ML
0.4 INJECTION, SOLUTION INTRAMUSCULAR; INTRAVENOUS; SUBCUTANEOUS AS NEEDED
Status: DISCONTINUED | OUTPATIENT
Start: 2020-06-12 | End: 2020-06-16 | Stop reason: HOSPADM

## 2020-06-12 RX ORDER — ZOLPIDEM TARTRATE 5 MG/1
5 TABLET ORAL
Status: DISCONTINUED | OUTPATIENT
Start: 2020-06-12 | End: 2020-06-16 | Stop reason: HOSPADM

## 2020-06-12 RX ORDER — IBUPROFEN 800 MG/1
800 TABLET ORAL EVERY 8 HOURS
Status: DISCONTINUED | OUTPATIENT
Start: 2020-06-12 | End: 2020-06-16 | Stop reason: HOSPADM

## 2020-06-12 RX ORDER — BUPIVACAINE HYDROCHLORIDE 7.5 MG/ML
INJECTION, SOLUTION EPIDURAL; RETROBULBAR AS NEEDED
Status: DISCONTINUED | OUTPATIENT
Start: 2020-06-12 | End: 2020-06-12 | Stop reason: HOSPADM

## 2020-06-12 RX ORDER — SODIUM CHLORIDE, SODIUM LACTATE, POTASSIUM CHLORIDE, CALCIUM CHLORIDE 600; 310; 30; 20 MG/100ML; MG/100ML; MG/100ML; MG/100ML
100 INJECTION, SOLUTION INTRAVENOUS CONTINUOUS
Status: DISCONTINUED | OUTPATIENT
Start: 2020-06-12 | End: 2020-06-14

## 2020-06-12 RX ORDER — DIPHENHYDRAMINE HCL 25 MG
25 CAPSULE ORAL
Status: DISCONTINUED | OUTPATIENT
Start: 2020-06-12 | End: 2020-06-16 | Stop reason: HOSPADM

## 2020-06-12 RX ORDER — KETOROLAC TROMETHAMINE 30 MG/ML
30 INJECTION, SOLUTION INTRAMUSCULAR; INTRAVENOUS
Status: DISCONTINUED | OUTPATIENT
Start: 2020-06-12 | End: 2020-06-12 | Stop reason: SDUPTHER

## 2020-06-12 RX ORDER — HYDRALAZINE HYDROCHLORIDE 20 MG/ML
10 INJECTION INTRAMUSCULAR; INTRAVENOUS ONCE
Status: COMPLETED | OUTPATIENT
Start: 2020-06-12 | End: 2020-06-12

## 2020-06-12 RX ORDER — DIPHENHYDRAMINE HYDROCHLORIDE 50 MG/ML
25 INJECTION, SOLUTION INTRAMUSCULAR; INTRAVENOUS ONCE
Status: ACTIVE | OUTPATIENT
Start: 2020-06-12 | End: 2020-06-12

## 2020-06-12 RX ORDER — ACETAMINOPHEN 325 MG/1
650 TABLET ORAL
Status: DISCONTINUED | OUTPATIENT
Start: 2020-06-12 | End: 2020-06-12 | Stop reason: SDUPTHER

## 2020-06-12 RX ORDER — FENTANYL CITRATE 50 UG/ML
INJECTION, SOLUTION INTRAMUSCULAR; INTRAVENOUS AS NEEDED
Status: DISCONTINUED | OUTPATIENT
Start: 2020-06-12 | End: 2020-06-12 | Stop reason: HOSPADM

## 2020-06-12 RX ORDER — DIPHENHYDRAMINE HYDROCHLORIDE 50 MG/ML
25 INJECTION, SOLUTION INTRAMUSCULAR; INTRAVENOUS ONCE
Status: COMPLETED | OUTPATIENT
Start: 2020-06-12 | End: 2020-06-12

## 2020-06-12 RX ORDER — OXYTOCIN/0.9 % SODIUM CHLORIDE 20/1000 ML
125-500 PLASTIC BAG, INJECTION (ML) INTRAVENOUS ONCE
Status: COMPLETED | OUTPATIENT
Start: 2020-06-12 | End: 2020-06-12

## 2020-06-12 RX ORDER — ACETAMINOPHEN 500 MG
1000 TABLET ORAL ONCE
Status: COMPLETED | OUTPATIENT
Start: 2020-06-12 | End: 2020-06-12

## 2020-06-12 RX ORDER — MORPHINE SULFATE 0.5 MG/ML
INJECTION, SOLUTION EPIDURAL; INTRATHECAL; INTRAVENOUS AS NEEDED
Status: DISCONTINUED | OUTPATIENT
Start: 2020-06-12 | End: 2020-06-12 | Stop reason: HOSPADM

## 2020-06-12 RX ORDER — MISOPROSTOL 200 UG/1
800 TABLET ORAL ONCE
Status: COMPLETED | OUTPATIENT
Start: 2020-06-12 | End: 2020-06-12

## 2020-06-12 RX ORDER — OXYCODONE HYDROCHLORIDE 5 MG/1
5 TABLET ORAL
Status: ACTIVE | OUTPATIENT
Start: 2020-06-12 | End: 2020-06-13

## 2020-06-12 RX ADMIN — HYDRALAZINE HYDROCHLORIDE 10 MG: 20 INJECTION INTRAMUSCULAR; INTRAVENOUS at 00:38

## 2020-06-12 RX ADMIN — CEFAZOLIN SODIUM 2 G: 1 POWDER, FOR SOLUTION INTRAMUSCULAR; INTRAVENOUS at 02:09

## 2020-06-12 RX ADMIN — IOPAMIDOL 80 ML: 755 INJECTION, SOLUTION INTRAVENOUS at 05:03

## 2020-06-12 RX ADMIN — ACETAMINOPHEN 1000 MG: 500 TABLET ORAL at 13:38

## 2020-06-12 RX ADMIN — SODIUM CHLORIDE, POTASSIUM CHLORIDE, SODIUM LACTATE AND CALCIUM CHLORIDE 100 ML/HR: 600; 310; 30; 20 INJECTION, SOLUTION INTRAVENOUS at 18:15

## 2020-06-12 RX ADMIN — SODIUM CHLORIDE, SODIUM LACTATE, POTASSIUM CHLORIDE, AND CALCIUM CHLORIDE 75 ML/HR: 600; 310; 30; 20 INJECTION, SOLUTION INTRAVENOUS at 06:41

## 2020-06-12 RX ADMIN — OXYTOCIN 20 UNITS: 10 INJECTION, SOLUTION INTRAMUSCULAR; INTRAVENOUS at 02:25

## 2020-06-12 RX ADMIN — BUPIVACAINE HYDROCHLORIDE 1.6 ML: 7.5 INJECTION, SOLUTION EPIDURAL; RETROBULBAR at 02:01

## 2020-06-12 RX ADMIN — FUROSEMIDE 20 MG: 10 INJECTION, SOLUTION INTRAMUSCULAR; INTRAVENOUS at 16:32

## 2020-06-12 RX ADMIN — Medication 10 ML: at 07:29

## 2020-06-12 RX ADMIN — DIPHENHYDRAMINE HYDROCHLORIDE 25 MG: 50 INJECTION, SOLUTION INTRAMUSCULAR; INTRAVENOUS at 13:38

## 2020-06-12 RX ADMIN — SODIUM CHLORIDE, SODIUM LACTATE, POTASSIUM CHLORIDE, AND CALCIUM CHLORIDE 75 ML/HR: 600; 310; 30; 20 INJECTION, SOLUTION INTRAVENOUS at 05:20

## 2020-06-12 RX ADMIN — IBUPROFEN 800 MG: 800 TABLET ORAL at 23:25

## 2020-06-12 RX ADMIN — ONDANSETRON HYDROCHLORIDE 4 MG: 2 SOLUTION INTRAMUSCULAR; INTRAVENOUS at 03:30

## 2020-06-12 RX ADMIN — KETOROLAC TROMETHAMINE 30 MG: 30 INJECTION, SOLUTION INTRAMUSCULAR at 07:26

## 2020-06-12 RX ADMIN — Medication 10 ML: at 16:31

## 2020-06-12 RX ADMIN — SODIUM CHLORIDE, SODIUM LACTATE, POTASSIUM CHLORIDE, AND CALCIUM CHLORIDE: 600; 310; 30; 20 INJECTION, SOLUTION INTRAVENOUS at 02:04

## 2020-06-12 RX ADMIN — MISOPROSTOL 800 MCG: 200 TABLET ORAL at 08:05

## 2020-06-12 RX ADMIN — MAGNESIUM SULFATE IN WATER 1 G/HR: 40 INJECTION, SOLUTION INTRAVENOUS at 05:18

## 2020-06-12 RX ADMIN — FENTANYL CITRATE 20 MCG: 0.05 INJECTION, SOLUTION INTRAMUSCULAR; INTRAVENOUS at 02:01

## 2020-06-12 RX ADMIN — Medication 2000 MILLI-UNITS/HR: at 08:01

## 2020-06-12 RX ADMIN — MORPHINE SULFATE 250 MCG: 0.5 INJECTION, SOLUTION EPIDURAL; INTRATHECAL; INTRAVENOUS at 02:01

## 2020-06-12 RX ADMIN — FUROSEMIDE 20 MG: 10 INJECTION, SOLUTION INTRAMUSCULAR; INTRAVENOUS at 05:49

## 2020-06-12 NOTE — PROGRESS NOTES
0700: Bedside and Verbal shift change report given to SARA Downs RN (oncoming nurse) by ROBERTO Genao RN (offgoing nurse). Report included the following information SBAR, Kardex, OR Summary, Intake/Output, MAR, Recent Results and Med Rec Status. 0740: Dr. Bautista Benson given update on patient bleeding, elevated heart rate and absence of bowel sounds and moderate bleeding. MD reviewing lab work and stated she would come in and evaluate the patient. 0800: Dr. Bautista Benson at bedside, MD discussing plan of care for 2 units of blood, 800mcg of misoprostol, and switching LR over to pitocin 20 mu/1000 ml and running it at 100 ml/hr. Pt verbalized understanding and agreeing with plan of care. 0845: Pt calling out reporting a \"gush\" of blood, RN at bedside, large \"fist sized\" clot expressed. Pad changed and weighed 248ml. Blood bank called to check to see if blood is ready, per Blood bank they are running her type and screen now, since pt is A- her type and screen may need to be sent to [facility]. 0848: Dr. Bautista Benson called, no answer at this time. 2007: Dr. Bautista Benson calling back and given update on pt, will let MD know if type and screen needs to be sent to [facility] or if pt becomes symptomatic.     5464: Dr. Bautista Benson made aware of type and screen with positive antibody and needing to be sent to [facility]. Will continue to monitor. 1025: Pt given incentive spirometer and instructed on use, pt demonstrated use correctly. 1116: RN at bedside for mag check, pt pumping at this time and denies any needs. 1348: First unit of PRBCs started and verified with Leonardo Rush RN.    1622: First unit of PRBCs completed, no reaction suspected. 1659: Second unit of PRBCs started and verified with DAIJA Lynn RN. 1730: Dr. Bautista Benson given update on pt and her labs. Orders received to draw repeat H&H 6 hours after transfusion is complete. Will continue to monitor. 1910: Second unit of PRBCs completed, no reaction suspected.   Bedside and Verbal shift change report given to YVONNE Adams RN (oncoming nurse) by Jie Yoo RN (offgoing nurse). Report included the following information SBAR, Kardex, OR Summary, Intake/Output, MAR, Recent Results and Med Rec Status.

## 2020-06-12 NOTE — ANESTHESIA PROCEDURE NOTES
Spinal Block    Start time: 6/12/2020 1:55 AM  End time: 6/12/2020 2:01 AM  Performed by: Dillon Arvizu MD  Authorized by: Dillon Arvizu MD     Pre-procedure:   Indications: at surgeon's request and primary anesthetic  Preanesthetic Checklist: patient identified, risks and benefits discussed, anesthesia consent and timeout performed      Spinal Block:   Patient Position:  Seated  Prep Region:  Lumbar  Prep: chlorhexidine      Location:  L3-4  Technique:  Single shot        Needle:   Needle Type:  Pencan  Needle Gauge:  25 G  Attempts:  1      Events: CSF confirmed, no blood with aspiration and no paresthesia        Assessment:  Insertion:  Uncomplicated  Patient tolerance:  Patient tolerated the procedure well with no immediate complications

## 2020-06-12 NOTE — OP NOTES
Operative Note    Name: Prachi Blount Bl Record Number: [id number]   YOB: 1990  Today's Date: 2020      Pre-operative Diagnosis: 1. IUP at 33+4                                                2.  Pre-eclampsia with severe features requiring multiple episodes of IV antihypertensives to control                                                3.  NRFM (repetitive late decels)                                                4.  Acute maternal decompensation (tachycardia, tachypnea, O2 requirement)    Post-operative Diagnosis: HAIR       Operation: Low Cervical Transverse Procedure(s):   SECTION    Surgeon(s):  Helen Leonard MD    Anesthesia: Spinal    Prophylactic Antibiotics: Ancef  DVT Prophylaxis: Sequential Compression Devices         Fetal Description: giraldo     Birth Information:   Information for the patient's :  Jie Aguillon [[id number]]   Delivery of a   female infant on 2020 at 2:23 AM. Apgars were 8  and 9 . Umbilical Cord: 3 Vessels     Umbilical Cord Events: Nuchal Cord Without Compressions     Placenta: Manual Removal removal with Normal appearance. Amniotic Fluid Volume:        Amniotic Fluid Description:  Clear    Cord Gas:  Arterial 1.47/31/68/-8.6    Umbilical Cord: Nuchal Cord x  1 and 3 vessels present    Placenta:  expressed    Specimens: cord gases, cord blood and placenta to path           Complications:  none    Procedure Detail:    After proper patient identification and consent, the patient was taken to the operating room, where spinal anesthesia was administered. The patient was rolled to her left to prevent compression of her vena cava. A  Solis catheter was placed using sterile technique. Automatic sequential compression devices were placed on her legs, then the abdomen  was prepped. A a sterile drape was applied.   After confirmation of an adequate level of anesthesia,  a transverse skin incision was made with the scalpel; the subcutaneous layer was opened sharply and bluntly. The rectus fascia was cleaned, and a small transverse fascial incision was made near the midline with the scalpel. This fascial incision was extended transversely in a curvilinear fashion with curved Ortega scissors. Bleeding was controlled with the electrocautery. The rectus aponeurosis was sharply dissected away from the overlying rectus fascia and the rectus muscles were pulled bluntly from the midline. The peritoneum was entered bluntly, and blunt finger dissection was used to extend the peritoneal incision. A Mckeon retractor was used for exposure. A transverse uterine peritoneal incision was made in a curvilinear fashion using Metzenbaum scissors. A superficial transverse myometrial incision was made near the midline using the scalpel, then finger dissection was used to bluntly complete this incision into the uterus. Amniotomy was performed with the release of copius amount clear. The infant's head was found in a direct OA position. With the assistance of fundal pressure, the infant was  delivered atraumatically. The nose and mouth were bulb-suctioned, delayed cord clamping x 1 min, the cord was doubly clamped and cut, then the baby was handed off to  staff in attendance. The placenta was removed from the uterus. Geoff self retaining retractor placed. The uterus, tubes and ovaries appeared normal, and there was no extension of the uterine incision. The uterus was cleaned of retained membranes and blood using moist lap pad. The uterine incision was closed with two overlapping  0 vicryl, double layer  in running locking fashion, several more figure of eight stitches were required to achieve good hemostasis. The pelvis was cleaned of retained blood and amniotic fluid using sterile, body temperature saline irritation and wet lap tapes. Rey applied. Hemostasis was again confirmed.  The fascia was closed with 0 Vicryl in a running fashion. Subcutaneous tissues were irrigated and made hemostatic using bovie cautery. The skin was closed using 4-0 monocryl. Dermabond applied. The patient tolerated the procedure well. Sponge, lap tape and needle counts were correct times three. The patient and baby were taken to the postpartum room in stable condition. QBL pending.     Luda Ivey MD  June 12, 2020  3:24 AM

## 2020-06-12 NOTE — L&D DELIVERY NOTE
Delivery Summary    Patient: Elina Lino MRN: [id number]  SSN: [id number]    YOB: 1990  Age: 31 y.o. Sex: female        Information for the patient's :  Low Jemma [[id number]]       Labor Events:    Labor: No    Steroids: Full Course   Cervical Ripening Date/Time:       Cervical Ripening Type: Misoprostol   Antibiotics During Labor:     Rupture Identifier:      Rupture Date/Time: 2020 2:22 AM   Rupture Type: AROM   Amniotic Fluid Volume:      Amniotic Fluid Description: Clear    Amniotic Fluid Odor: None    Induction:         Induction Date/Time:        Indications for Induction:      Augmentation:     Augmentation Date/Time:      Indications for Augmentation:     Labor complications: Fetal Intolerance       Additional complications:        Delivery Events:  Indications For Episiotomy:     Episiotomy: None   Perineal Laceration(s):     Repaired:     Periurethral Laceration Location:      Repaired:     Labial Laceration Location:     Repaired:     Sulcal Laceration Location:     Repaired:     Vaginal Laceration Location:     Repaired:     Cervical Laceration Location:     Repaired:     Repair Suture:     Number of Repair Packets:     Estimated Blood Loss (ml):  ml   Quantitaive Blood Loss (ml):             Delivery Date: 2020    Delivery Time: 2:23 AM   Delivery Type: , Low Transverse     Details    Trial of Labor: Yes   Primary/Repeat: Primary   Priority: Emergency   Indications:  Fetal Intolerance of Labor; Other (Add Comments) maternal decompensation (persistent severe range BP's, acute onset tachypnea/tachycardia and O2 requirement)     Sex:  Female     Gestational Age: 33w4d  Delivery Clinician:  Jaquelin Donis  Living Status: Living   Delivery Location: OR            APGARS  One minute Five minutes Ten minutes   Skin color: 0   1        Heart rate: 2   2        Grimace: 2   2        Muscle tone: 2   2        Breathin 2        Totals: 8   9          Presentation: Vertex    Position:     Direct OA   Resuscitation Method:        Meconium Stained:        Cord Information: 3 Vessels  Complications: Nuchal Cord Without Compressions  Cord around: head  Delayed cord clamping? Yes  Cord clamped date/time:2020  2:24 AM  Disposition of Cord Blood: Lab    Blood Gases Sent?: Yes    Placenta:  Date/Time: 2020  2:26 AM  Removal: Expressed      Appearance: Normal      Measurements:  Birth Weight:    2415 gm    Birth Length:        Head Circumference:        Chest Circumference:       Abdominal Girth:       Other Providers:   RENU Anderson;;;;TRINI MANZANARES, Obstetrician;Primary Nurse;Primary  Nurse;Nicu Nurse;Neonatologist;Anesthesiologist;Scrub Tech     Arterial Cord Gas:  7.22/56/22/-6.2

## 2020-06-12 NOTE — PROGRESS NOTES
PostPartum Note    Virginia Pierre  [id number]  1990  31 y.o.    S:  Ms. Virginia Pierre is a 31 y.o.  POD #0 s/p LTCS for severe preE @ 33w4d. CT done o/n shows no e/o PE.  + pulmonary edema. S/p lasix. Has continued to require O2 since surgery. Has not gotten up but denies feeling dizzy. Pain controlled. Bleeding has been moderate. Pads changed x2 this AM.      Denies ha/vis change. O:   Visit Vitals  /66   Pulse (!) 120   Temp 98.7 °F (37.1 °C)   Resp 16   Ht 5' 2\" (1.575 m)   Wt 78 kg (172 lb)   LMP 2019   SpO2 97%   Breastfeeding No   BMI 31.46 kg/m²     Date 20 07 - 20 0659 20 07 - 20 0659   Shift 0637-3927 3571-2027 24 Hour Total 1279-5444 0988-9211 24 Hour Total   INTAKE   I.V.(mL/kg/hr) 688.3(0.7) 1051.3(1.1) 1739.6(0.9) 198. 3  198.3     Magnesium Sulfate Volume 188.3 401.3 589.6 47.9  47.9     Pitocin Volume    25  25     Volume (lactated Ringers infusion) 300 600 900 68.8  68.8     Volume (lactated Ringers infusion)    56.7  56.7     Volume (magnesium sulfate 2 g/50 ml IVPB (premix or compounded)) 200 50 250      Shift Total(mL/kg) 688. 3(8.8) 1051. 3(13.5) 1739. 6(22.3) 198.3(2.5)  198.3(2.5)   OUTPUT   Urine(mL/kg/hr)  1500(1.6) 1500(0.8) 155  155     Urine Voided  1100 1100        Urine Output (mL) (Urinary Catheter 20 Solis)  400 400 155  155   Blood  1030 1030        Quantitative Blood Loss  1030 1030      Shift Total(mL/kg)  2530(32.4) 2530(32.4) 155(2)  155(2)   .3 -1478.8 -790.4 43.3  43.3   Weight (kg) 78 78 78 78 78 78        Lab Results   Component Value Date/Time    WBC 14.4 (H) 2020 03:59 AM    HGB 7.1 (L) 2020 03:59 AM    HCT 22.6 (L) 2020 03:59 AM    PLATELET 840  03:59 AM    MCV 77.1 (L) 2020 03:59 AM    Hgb, External 11.7 2019    Hct, External 35.2 2019     Lab Results   Component Value Date/Time    Sodium 136 2020 03:59 AM    Potassium 3.4 (L) 06/12/2020 03:59 AM    Chloride 107 06/12/2020 03:59 AM    CO2 20 (L) 06/12/2020 03:59 AM    Anion gap 9 06/12/2020 03:59 AM    Glucose 106 (H) 06/12/2020 03:59 AM    BUN 10 06/12/2020 03:59 AM    Creatinine 0.87 06/12/2020 03:59 AM    BUN/Creatinine ratio 11 (L) 06/12/2020 03:59 AM    GFR est AA >60 06/12/2020 03:59 AM    GFR est non-AA >60 06/12/2020 03:59 AM    Calcium 6.5 (L) 06/12/2020 03:59 AM    Bilirubin, total 0.4 06/12/2020 03:59 AM    Alk. phosphatase 145 (H) 06/12/2020 03:59 AM    Protein, total 5.6 (L) 06/12/2020 03:59 AM    Albumin 2.0 (L) 06/12/2020 03:59 AM    Globulin 3.6 06/12/2020 03:59 AM    A-G Ratio 0.6 (L) 06/12/2020 03:59 AM    ALT (SGPT) 30 06/12/2020 03:59 AM     Magnesium   Date Value Ref Range Status   06/12/2020 4.8 (H) 1.6 - 2.4 mg/dL Final         Gen - NAD, resting comfortably. CV - Tachy. No murmurs  Pulm - No wheezing. Decreased at bases  Abdomen - Fundus firm, below the umbilicus, bandage c/d/i,  Decreased BS   Ext - Warm, well perfused. + edema. 2+ DTRs     A/P:  POD #0 s/p LTCS @ 33w4d     - Preeclampsia - continue magnesium. Currently at 1 2/2 events overnight. - Severe anemia - chronic, however continued blood loss, pulse, O2 requirement. Will plan 2units PRBC. Patient consented. Lasix between and premeds. - O2 requirement - continue oxygen and close monitoring.             Yohan Minaya MD  [facility]

## 2020-06-12 NOTE — PROGRESS NOTES
2020  11:56 AM    CM met with LEE to complete initial assessment and complete discharge planning. MOB verified and confirmed demographic, LEE's phone # ([phone]). LEE lives with boyfriend/FOBMpablo Conroy ([phone])  at the address on file in [place], [address] [place] [address]. LEE is a  now, has 3 other children from a previous relationship, ages: 6, 8, and 5yr olds. LEE stated she was working for [employer], and will not be working for a while. FOEDVIN is employed through [employer], and will be taking about 6wks off. LEE reports she has good family support with her family, and FOB's family. LEE plans to breast feed baby and has ordered a pump, stated she has not received it yet. LEE plans to follow with Aston Kelley. LEE has car seat, bassinet/crib, clothing, bottles and all necessary supplies for baby. LEE has [employer], and will be adding baby to this policy. CM discussed process to add baby to insurance, MOB verbalized understanding. LEE denied needing WIC services at this time. MOB confirmed she uses [facility] in [place] for Rx, and stated she does not have issues affording medication. LEE stated she is not currently taking any psych meds and does not feels she needs to be on any at this time. LEE stated she has had PTSD in the past and has contact information for providers, if needed. CM discussed baby Shashi 1827 admission to NICU for respiratory distress. Baby born on 20, 2415 grams and 59get5g. Baby is on bCPAP +5/21% FiO2 for grunting and retracting, baby will be weaned off bCPAP today at noon. Currently on IV fluids, NPO, and in isolette. MOB confirmed she would have access to transportation to visit with baby daily. Care Management Interventions  PCP Verified by CM: Yes(Dr. Ree Reese)  Mode of Transport at Discharge:  Other (see comment)  Transition of Care Consult (CM Consult): Discharge Planning  Current Support Network: Family Lives [place], Own Home, Has Personal Caregivers  Confirm Follow Up Transport: Family  Discharge Location  Discharge Placement: Home with family assistance  Анна Burnett

## 2020-06-12 NOTE — ANESTHESIA PREPROCEDURE EVALUATION
Relevant Problems   No relevant active problems       Anesthetic History   No history of anesthetic complications            Review of Systems / Medical History  Patient summary reviewed, nursing notes reviewed and pertinent labs reviewed    Pulmonary  Within defined limits                 Neuro/Psych         Psychiatric history     Cardiovascular    Hypertension              Exercise tolerance: >4 METS     GI/Hepatic/Renal         Renal disease: stones       Endo/Other        Anemia     Other Findings   Comments: PTSD  Pre-eclampsia  bipolar           Physical Exam    Airway  Mallampati: II    Neck ROM: normal range of motion   Mouth opening: Normal     Cardiovascular  Regular rate and rhythm,  S1 and S2 normal,  no murmur, click, rub, or gallop  Rhythm: regular  Rate: normal         Dental  No notable dental hx       Pulmonary  Breath sounds clear to auscultation               Abdominal  GI exam deferred       Other Findings            Anesthetic Plan    ASA: 3  Patient did not consent to regional anesthesiaAnesthesia type: spinal          Induction: Intravenous  Anesthetic plan and risks discussed with: Patient      Pt presented with pre-eclampsia and acute shortness of breath with a significant oxygen requirement. Pt now on 10L non-rebreather with sats upper 90s dropping to 90 without oxygen. Pt is short of breath but lung sounds are clear.

## 2020-06-12 NOTE — PROGRESS NOTES
History: Hypoxia.     CTA of the chest was performed. 80 mL Isovue-370 were injected and scanning was  performed during the arterial phase of contrast administration. Post processing  was performed. 3D reconstructions were performed. CT dose reduction was  achieved through use of a standardized protocol tailored for this examination  and automatic exposure control for dose modulation.       There are no intraluminal filling defects to suggest pulmonary embolism. The  heart, pericardium, and great vessels appear unremarkable. The chest wall and  axilla appear unremarkable. There are small bilateral pleural effusions. There  is bilateral lower lobe consolidation. There is minimal groundglass  opacification in the right middle lobe and minimal nodular opacification in the  right upper lobe. There is a cyst in the left upper lobe. There is smooth septal  thickening.     IMPRESSION  IMPRESSION:  1. No evidence for pulmonary embolism. 2. Bilateral lower lobe consolidation, mild groundglass opacification in the  right middle lobe and mild nodular opacification in the right upper lobe  associated with small bilateral pleural effusions and smooth septal thickening  most compatible with volume overload although superimposed pneumonia cannot be  Excluded. A:  No e/o PE.  CT c/w volume overload c/w pre-eclampsia. P:  Lasix 20 mg IV x 1 now. Continue to closely monitor. Mag Sulfate was discontinued during the  and restarted at 1 gm/hr postoperatively (vice 2) as pt is still at risk of sz.

## 2020-06-12 NOTE — ANESTHESIA POSTPROCEDURE EVALUATION
Procedure(s):   SECTION.     spinal    Anesthesia Post Evaluation      Multimodal analgesia: multimodal analgesia used between 6 hours prior to anesthesia start to PACU discharge  Patient location during evaluation: bedside  Patient participation: complete - patient participated  Level of consciousness: awake  Pain management: adequate  Airway patency: patent  Anesthetic complications: no  Cardiovascular status: acceptable  Respiratory status: acceptable  Hydration status: acceptable        INITIAL Post-op Vital signs:   Vitals Value Taken Time   /79 2020  5:20 AM   Temp 37.1 °C (98.7 °F) 2020  3:56 AM   Pulse 116 2020  5:10 AM   Resp 18 2020  5:20 AM   SpO2 99 % 2020  5:24 AM

## 2020-06-12 NOTE — LACTATION NOTE
This note was copied from a baby's chart. Discussed with mother her plan for feeding. Reviewed the benefits of exclusive breast milk feeding during the hospital stay. Informed her of the risks of using formula to supplement in the first few days of life as well as the benefits of successful breast milk feeding; referred her to the Breastfeeding booklet about this information. She acknowledges understanding of information reviewed and states that it is her plan to breastfeeding her infant. Will support her choice and offer additional information as needed. Guidelines for pumping, milk collection and storage, proper cleaning of pump parts all reviewed. Differences between hospital grade rental pumps vs store bought double electric/hand pumps discussed. Set up pumping with double electric set up. Assisted with pump session. List of area pump rental locations and lactation support services reviewed. Reviewed effects/risks of late  birth on initiation of breastfeeding including infant's sleepiness, ineffective or missed breastfeedings, infant's decreased stamina to sustain prolonged latch and effective breastfeeding, decreased energy reserves related to low birth wt and inability to stimulate milk supply. Recommended interventions include skin to skin bonding at breast, hand expression of colostrum as infant rests at breast and initiation of breastfeeding as infant is able, initiation of pumping regimen to begin within 6 hours of birth as mom is able; complement/supplement feeding as guided by neonatologist.     Inés Odom Mom info on cleaning pump parts and setup breast pump. Gave pump log. Mom pumping, discussed importance of manual expression with pumping.

## 2020-06-12 NOTE — PROGRESS NOTES
Progress Note    Amie Iqbal  [id number]  1990   33w4d      S:  Feeling slightly better than this AM.  Not dizzy. Bleeding improved with cytotec and pitocin. Blood with + ab screen 2/2 Rhogam.  To [facility] for crossmatch. Pending. O:    Visit Vitals  BP (!) 140/91   Pulse (!) 109   Temp 98 °F (36.7 °C)   Resp 16   Ht 5' 2\" (1.575 m)   Wt 78 kg (172 lb)   LMP 2019   SpO2 100%   Breastfeeding Unknown   BMI 31.46 kg/m²     Vitals:    20 1312 20 1316 20 1317 20 1322   BP:  133/73     Pulse:  (!) 120     Resp:  16     Temp:       SpO2: 99%  99% 100%   Weight:       Height:         Date 20 0700 - 20 0659 20 0700 - 20 0659   Shift 9780-7193 6451-0699 24 Hour Total 6685-6876 5061-6094 24 Hour Total   INTAKE   I.V.(mL/kg/hr) 688.3(0.7) 1051.3(1.1) 1739.6(0.9) 823.3  823.3     Magnesium Sulfate Volume 188.3 401.3 589. 6 172.9  172.9     Pitocin Volume    525  525     Volume (lactated Ringers infusion) 300 600 900 68.8  68.8     Volume (lactated Ringers infusion)    56.7  56.7     Volume (magnesium sulfate 2 g/50 ml IVPB (premix or compounded)) 200 50 250      Shift Total(mL/kg) 688. 3(8.8) 1051. 3(13.5) 1739. 6(22.3) 823. 3(10.6)  823. 3(10.6)   OUTPUT   Urine(mL/kg/hr)  1500(1.6) 1500(0.8) 360  360     Urine Voided  1100 1100        Urine Output (mL) (Urinary Catheter 20 Solis)  400 400 360  360   Blood  1810 1810 310  310     Quantitative Blood Loss  1030 1030 310  310     Blood  780 780      Shift Total(mL/kg)  3310(42.4) 3310(42.4) 670(8.6)  670(8.6)   .3 -2258.8 -1570.4 153.3  153.3   Weight (kg) 78 78 78 78 78 78      Gen - pale, NAD         A/P:  31 y.o. G13 [phone] @ 33w4d - Anemia     Pending blood transfusion. O2 stats are improved. Continues with tachycardia. Using IS. No chest pain. Lochia is appropriate.       ______________________  6:39 PM      Currently on 2nd unit of PRBC. Feeling better. O2 is off and sats improved.   HR improved. Plan to continue magnesium until 24h PP. Continue blood and labs 4 hours s/p 2nd unit.         Rashaun Moreno MD  [facility]

## 2020-06-12 NOTE — PROGRESS NOTES
2315: Bedside and Verbal shift change report given to SARA Downs RN (oncoming nurse) by RAKESH Patel RN (offgoing nurse). Report included the following information SBAR, Kardex, Intake/Output, MAR, Recent Results and Med Rec Status. 0020: Dr. Serge Green given update on pt elevated BP. MD stated to retake pt pressure in 15 minutes and give 10 mg hydralazine if systolic BP remains over 092.     0030: Pt reporting difficulty breathing. 10L O2 via NRB applied. Pt complaining of cramping pain. 4206: 10 mg Hydralazine adminstered. 0050: Dr. Ryan given update on pt difficulty breathing. 6992: C-section called by Dr. Ryan for fetal intolerance and maternal instability. 0126: PAPR requested at this time. 0150: Pt transferred from room 205 to L&D OR1 for primary  due to fetal intolerance. 8524: Primary  of viable female infant. 0226: Manual removal of intact placenta.     0324: Pt transferred back from L&D OR1 to room 205 via stretcher. Pt vitals stable at this time. 6904: CT contacted about when pt can be brought up. This RN was told to bring the pt up to CT in 30 minutes. 0440: Pt transported to CT via stretcher. 0510: Pt returned to room 205 on L&D unit at this time. Pt O2 saturation at 100 with 3 L NC. NC removed at this time. 4438: Pt O2 saturation in low 90's. 2 L NC reapplied at this time. 36: Dr. Ryan stating to administer pt 20 mg lasix due to fluid in lungs. MD stated to add on Magnesium level to pt CMP.    5614: Pt requesting to remove NC. RN removing NC at this time. Pt instructed to report any SOB. Will continue to monitor. 0700: Bedside and Verbal shift change report given to ROBERTO Smith RN (oncoming nurse) by Nettie Downs (offgoing nurse). Report included the following information SBAR, Kardex, Procedure Summary, Intake/Output, MAR, Recent Results and Med Rec Status.

## 2020-06-12 NOTE — PROGRESS NOTES
Rapid COVID negative. Patient continues to have an unexplained O2 requirement. Lungs CTAB except bi-basilar rales. Case d/w anesthesia and on call radiologist.  Will proceed with CT PE protocol. Iodine allergy to shellfish and betadine prep used for her first vaginal delivery. D/w radiologist and not a contraindication to IV contrast.  WIll pre-rx with benadryl due to pt anxiety.

## 2020-06-12 NOTE — PROGRESS NOTES
Called to see patient due to acute decompensation. Pt is a [id number] at 33+4 undergoing IOL due to pre-e w/ severe features and difficult to control BP's requiring multiple doses of IV  antihypertensives. Pt is s/p BMZ at initial dx of pre-e last week and is currently on mag sulfate for sz proph. Pt now is tachycardic and O2 requiring (10L rebreather to bring sats from 90 to 97). HR high 120's. RR high 20's. FHT:  150 with min-mod variability and repetitive lates with ctx's q 2 min s/p cytotec at 2217. Concern for acute pre-e decompensation vs COVID vs PE. Patient consented for emergent  due to NRFM. Rapid COVID pending. Consider PE protocol CT PP pending pt status postop and COVID results.

## 2020-06-13 LAB
ANION GAP SERPL CALC-SCNC: 7 MMOL/L (ref 5–15)
BUN SERPL-MCNC: 14 MG/DL (ref 6–20)
BUN/CREAT SERPL: 17 (ref 12–20)
CALCIUM SERPL-MCNC: 5.8 MG/DL (ref 8.5–10.1)
CHLORIDE SERPL-SCNC: 103 MMOL/L (ref 97–108)
CO2 SERPL-SCNC: 23 MMOL/L (ref 21–32)
CREAT SERPL-MCNC: 0.83 MG/DL (ref 0.55–1.02)
ERYTHROCYTE [DISTWIDTH] IN BLOOD BY AUTOMATED COUNT: 15.9 % (ref 11.5–14.5)
GLUCOSE SERPL-MCNC: 94 MG/DL (ref 65–100)
HCT VFR BLD AUTO: 21.1 % (ref 35–47)
HGB BLD-MCNC: 6.9 G/DL (ref 11.5–16)
MCH RBC QN AUTO: 26.2 PG (ref 26–34)
MCHC RBC AUTO-ENTMCNC: 32.7 G/DL (ref 30–36.5)
MCV RBC AUTO: 80.2 FL (ref 80–99)
NRBC # BLD: 0 K/UL (ref 0–0.01)
NRBC BLD-RTO: 0 PER 100 WBC
PLATELET # BLD AUTO: 237 K/UL (ref 150–400)
PMV BLD AUTO: 9 FL (ref 8.9–12.9)
POTASSIUM SERPL-SCNC: 3.9 MMOL/L (ref 3.5–5.1)
RBC # BLD AUTO: 2.63 M/UL (ref 3.8–5.2)
SODIUM SERPL-SCNC: 133 MMOL/L (ref 136–145)
WBC # BLD AUTO: 13.3 K/UL (ref 3.6–11)

## 2020-06-13 PROCEDURE — 80048 BASIC METABOLIC PNL TOTAL CA: CPT

## 2020-06-13 PROCEDURE — 65270000029 HC RM PRIVATE

## 2020-06-13 PROCEDURE — 85027 COMPLETE CBC AUTOMATED: CPT

## 2020-06-13 PROCEDURE — 86900 BLOOD TYPING SEROLOGIC ABO: CPT

## 2020-06-13 PROCEDURE — 77030036554

## 2020-06-13 PROCEDURE — 85461 HEMOGLOBIN FETAL: CPT

## 2020-06-13 PROCEDURE — 36415 COLL VENOUS BLD VENIPUNCTURE: CPT

## 2020-06-13 PROCEDURE — 74011250637 HC RX REV CODE- 250/637: Performed by: OBSTETRICS & GYNECOLOGY

## 2020-06-13 PROCEDURE — 74011250636 HC RX REV CODE- 250/636: Performed by: OBSTETRICS & GYNECOLOGY

## 2020-06-13 RX ORDER — CALCIUM CARBONATE 200(500)MG
400 TABLET,CHEWABLE ORAL 2 TIMES DAILY
Status: COMPLETED | OUTPATIENT
Start: 2020-06-13 | End: 2020-06-13

## 2020-06-13 RX ORDER — LANOLIN ALCOHOL/MO/W.PET/CERES
1 CREAM (GRAM) TOPICAL 2 TIMES DAILY WITH MEALS
Status: DISCONTINUED | OUTPATIENT
Start: 2020-06-13 | End: 2020-06-16 | Stop reason: HOSPADM

## 2020-06-13 RX ADMIN — ANTACID TABLETS 400 MG: 500 TABLET, CHEWABLE ORAL at 17:57

## 2020-06-13 RX ADMIN — SIMETHICONE 80 MG: 80 TABLET, CHEWABLE ORAL at 19:19

## 2020-06-13 RX ADMIN — FERROUS SULFATE TAB 325 MG (65 MG ELEMENTAL FE) 325 MG: 325 (65 FE) TAB at 17:58

## 2020-06-13 RX ADMIN — ANTACID TABLETS 400 MG: 500 TABLET, CHEWABLE ORAL at 06:57

## 2020-06-13 RX ADMIN — SIMETHICONE 80 MG: 80 TABLET, CHEWABLE ORAL at 11:18

## 2020-06-13 RX ADMIN — IBUPROFEN 800 MG: 800 TABLET ORAL at 08:59

## 2020-06-13 RX ADMIN — HUMAN RHO(D) IMMUNE GLOBULIN 0.3 MG: 300 INJECTION, SOLUTION INTRAMUSCULAR at 19:09

## 2020-06-13 RX ADMIN — IBUPROFEN 800 MG: 800 TABLET ORAL at 17:58

## 2020-06-13 RX ADMIN — Medication 1 TABLET: at 08:59

## 2020-06-13 RX ADMIN — OXYCODONE AND ACETAMINOPHEN 1 TABLET: 5; 325 TABLET ORAL at 11:18

## 2020-06-13 NOTE — PROGRESS NOTES
PostPartum Note    Yvonne Carr  [id number]  1990  31 y.o.    S:  Ms. Yvonne Carr is a 31 y.o.  POD #1 s/p LTCS for severe preeclampsia @ 33w4d. Doing better today. Has tolerated a regular diet and feels no n/v. Has passed flatus. No dizziness. Had 2 units PRBC yesterday. Magnesium turned off this AM.  Solis in place and UOP adequate. She had a baby girl who is in the NICU. She is pumping. O:   Visit Vitals  /89   Pulse 86   Temp 98.4 °F (36.9 °C)   Resp 16   Ht 5' 2\" (1.575 m)   Wt 78 kg (172 lb)   LMP 2019   SpO2 94%   Breastfeeding Unknown   BMI 31.46 kg/m²     Vitals:    20 0217 20 0317 20 0417 20 0719   BP: 111/72 117/79 115/74 140/89   Pulse: 80 76 75 86   Resp: 15 16 15 16   Temp:  98.4 °F (36.9 °C)  98.4 °F (36.9 °C)   SpO2:       Weight:       Height:             Lab Results   Component Value Date/Time    WBC 13.3 (H) 2020 12:43 AM    HGB 6.9 (L) 2020 12:43 AM    HCT 21.1 (L) 2020 12:43 AM    PLATELET 321  12:43 AM    MCV 80.2 2020 12:43 AM    Hgb, External 11.7 2019    Hct, External 35.2 2019     Lab Results   Component Value Date/Time    Sodium 133 (L) 2020 05:12 AM    Potassium 3.9 2020 05:12 AM    Chloride 103 2020 05:12 AM    CO2 23 2020 05:12 AM    Anion gap 7 2020 05:12 AM    Glucose 94 2020 05:12 AM    BUN 14 2020 05:12 AM    Creatinine 0.83 2020 05:12 AM    BUN/Creatinine ratio 17 2020 05:12 AM    GFR est AA >60 2020 05:12 AM    GFR est non-AA >60 2020 05:12 AM    Calcium 5.8 (LL) 2020 05:12 AM       Gen - No acute distress  CV - RRR   Pulm - Decreased on the right, no crackles/wheezes  Abdomen - Fundus firm, below the umbilicus; incision c/d/i   Ext - Warm, well perfused. Nontender; SCDs in place.      Date 20 - 20 - 20   Shift 7199-0833  Hour Total 6760-6875 9032-2313 24 Hour Total   INTAKE   I.V.(mL/kg/hr) 1450(1.5) 914.6(1) 2364.6(1.3) 1803.8  1803.8     Magnesium Sulfate Volume 297.9 212.9 510.8 0  0     Pitocin Volume 1025  1025        Volume (lactated Ringers infusion) 68.8  68.8 1803.8  1803.8     Volume (lactated Ringers infusion) 58.3 701.7 760      Blood 307.5 305 612.5        Volume (TRANSFUSE PACKED RBC'S) 307.5  307.5        Volume (TRANSFUSE PACKED RBC'S)  305 305      Shift Total(mL/kg) 1757. 5(22.5) 21 . 6(15.6) 2977. 1(38.2) 1803.8(23.1)  1803.8(23.1)   OUTPUT   Urine(mL/kg/hr) 735(0.8) 1715(1.8) 2450(1.3) 100  100     Urine Output (mL) (Urinary Catheter 06/12/20 [facility]) 735 1715 2450 100  100   Blood 310  310        Quantitative Blood Loss 310  310      Shift Total(mL/kg) 1045(13.4) 1715(22) 2760(35.4) 100(1.3)  100(1.3)   .5 -495.4 217.1 1703.8  1703.8   Weight (kg) 78 78 78 78 78 78        A/P:  POD #1 s/p LTCS for severe PreE @ 33w4d doing well. Clinically improved from yesterday s/p blood. UOP adequate and is tolerating a regular diet. O2 saturations wnl without oxygen. Magnesium is off. Plan to add iron and continue routine care. Precautions reviewed and will advance slowly today.         Max Umana MD  [facility]

## 2020-06-13 NOTE — PROGRESS NOTES
1967 Received report from Sahnnon Simpson. RN. Patient resting in bed, family at bedside. 8642 Discussed plan of care with Dr Kory Campbell. RN received orders to allow patient to have a regular diet. Will change diet order and continue to monitor. 1050 Discussed plan of care with Dr Roopa Vicente. RN received verbal orders to discontinue neri catheter. Will continue to monitor. 1209 Patient ambulated to the bathroom with RN assist. Destiney care, gown changed and pad change done at this time by patient. Patient ambulated to the wheel chair and was taken to the NICU to see baby with . No complaints at this time. Will continue to monitor. RN will change linen while patient is out of bed and in the NICU. 1920 Bedside and Verbal shift change report given to Filemon Killian RN (oncoming nurse) by FABIAN Mustafa (offgoing nurse). Report included the following information SBAR, Kardex and MAR.

## 2020-06-13 NOTE — PROGRESS NOTES
8318 - Dr. Ja Grossman called regarding critical Calcium 5.8, orderes received for 2 dose of Tums, no repeat labs. 2307 - Bedside and Verbal shift change report given to [address] (oncoming nurse) by Angelica Landeros (offgoing nurse). Report included the following information SBAR, Intake/Output, MAR and Recent Results.

## 2020-06-13 NOTE — LACTATION NOTE
This note was copied from a baby's chart.   Mom pumping, has no questions for [facility]

## 2020-06-14 PROCEDURE — 74011250637 HC RX REV CODE- 250/637: Performed by: OBSTETRICS & GYNECOLOGY

## 2020-06-14 PROCEDURE — 74011250637 HC RX REV CODE- 250/637

## 2020-06-14 PROCEDURE — 65270000029 HC RM PRIVATE

## 2020-06-14 RX ORDER — NIFEDIPINE 10 MG/1
10 CAPSULE ORAL
Status: COMPLETED | OUTPATIENT
Start: 2020-06-14 | End: 2020-06-14

## 2020-06-14 RX ORDER — LABETALOL 200 MG/1
200 TABLET, FILM COATED ORAL 2 TIMES DAILY
Status: DISCONTINUED | OUTPATIENT
Start: 2020-06-14 | End: 2020-06-15

## 2020-06-14 RX ORDER — HYDROCORTISONE ACETATE PRAMOXINE HCL 2.5; 1 G/100G; G/100G
CREAM TOPICAL
Status: DISCONTINUED | OUTPATIENT
Start: 2020-06-14 | End: 2020-06-16 | Stop reason: HOSPADM

## 2020-06-14 RX ORDER — NIFEDIPINE 10 MG/1
CAPSULE ORAL
Status: COMPLETED
Start: 2020-06-14 | End: 2020-06-14

## 2020-06-14 RX ADMIN — FERROUS SULFATE TAB 325 MG (65 MG ELEMENTAL FE) 325 MG: 325 (65 FE) TAB at 17:01

## 2020-06-14 RX ADMIN — NIFEDIPINE 10 MG: 10 CAPSULE ORAL at 18:16

## 2020-06-14 RX ADMIN — OXYCODONE AND ACETAMINOPHEN 1 TABLET: 5; 325 TABLET ORAL at 17:01

## 2020-06-14 RX ADMIN — OXYCODONE AND ACETAMINOPHEN 1 TABLET: 5; 325 TABLET ORAL at 03:16

## 2020-06-14 RX ADMIN — LABETALOL HYDROCHLORIDE 200 MG: 200 TABLET, FILM COATED ORAL at 20:29

## 2020-06-14 RX ADMIN — OXYCODONE AND ACETAMINOPHEN 1 TABLET: 5; 325 TABLET ORAL at 07:45

## 2020-06-14 RX ADMIN — IBUPROFEN 800 MG: 800 TABLET ORAL at 03:17

## 2020-06-14 RX ADMIN — IBUPROFEN 800 MG: 800 TABLET ORAL at 11:31

## 2020-06-14 RX ADMIN — IBUPROFEN 800 MG: 800 TABLET ORAL at 17:01

## 2020-06-14 RX ADMIN — OXYCODONE AND ACETAMINOPHEN 1 TABLET: 5; 325 TABLET ORAL at 20:29

## 2020-06-14 RX ADMIN — OXYCODONE AND ACETAMINOPHEN 1 TABLET: 5; 325 TABLET ORAL at 13:37

## 2020-06-14 RX ADMIN — FERROUS SULFATE TAB 325 MG (65 MG ELEMENTAL FE) 325 MG: 325 (65 FE) TAB at 07:45

## 2020-06-14 RX ADMIN — Medication 1 TABLET: at 08:08

## 2020-06-14 NOTE — PROGRESS NOTES
8614  Bedside and Verbal shift change report given to JULIANNE Morales RN (oncoming nurse) by Santa Escobedo (offgoing nurse). Report included the following information SBAR, Kardex and MAR   7133  Complete assessment done. Medicated with percocet 1 po for lower incisional pain. 0815  Pt ate complete bkft. Up to the NICU to visit her baby  9:43 AM Pt remains in the NICU. Complete linen change given. Dr Clemencia King aware of elevated BP this morning. 10:58 AM Pt finished pumping. Sitting at the bedside talking on her cell phone  1130  Medicated with Motrin 800mg po for lower abd cramping. Saline lock removed from her right hand. 1200  Pt states relief of pain after her Motrin  1300  Pt asleep  1338   Medicated with Percocet 1 po for c/o lower abd discomfort.

## 2020-06-14 NOTE — PROGRESS NOTES
PostPartum Note    Babs Workman  [id number]  1990  31 y.o.    S:  Ms. Babs Workman is a 31 y.o.  POD #2 s/p LTCS for severe preeclampsia @ 33w4d. Doing well. She had a baby girl who is doing well in the NICU. Her lochia is like a period. She describes her pain as mild and is well controlled with PO medications. She is pumping and this is going well. She is ambulating and voiding. Tolerating PO intake. Has been in the NICU. Passing flatus. Denies cp/sob/n/v.     O:   Visit Vitals  BP (!) 169/94 (BP 1 Location: Left arm)   Pulse 84   Temp 97.6 °F (36.4 °C)   Resp 20   Ht 5' 2\" (1.575 m)   Wt 78 kg (172 lb)   LMP 2019   SpO2 98%   Breastfeeding Unknown   BMI 31.46 kg/m²       Lab Results   Component Value Date/Time    WBC 13.3 (H) 2020 12:43 AM    HGB 6.9 (L) 2020 12:43 AM    HCT 21.1 (L) 2020 12:43 AM    PLATELET 988  12:43 AM    MCV 80.2 2020 12:43 AM    Hgb, External 11.7 2019    Hct, External 35.2 2019       Gen - No acute distress  CV - RRR   Pulm - CTAB   Abdomen - Fundus firm, below the umbilicus; incision c/d/i + BS   Ext - Warm, well perfused. Nontender    A/P:  POD #2 s/p LTCS  @ 33w4d     Clinically improved s/p 2 units PRBC. Continue routine PP management.        Le Velasco MD  [facility]

## 2020-06-14 NOTE — PROGRESS NOTES
1435-Bedside report received from JULIANNE Wilson, JAYNE. Pt getting ready to go to see infant in the NICU. 1537-Writer at bedside to obtain VS and perform assessment. Pt still in NICU. 1630-Pt still in NICU with infant. Writer checked on pt at this time. Pt instructed to call writer once she is back in her room. Pt verbalized understanding. 1658-Pt called out stating she was back in her room. Pt requesting motrin, and percocet at this time for pain. 1701-Pt medicated for pain. Pt requesting a few minutes to settle in before assessment and VS.    1811-Dr. Nessa Noonan reported elevated blood pressures of 165/96 and 178/97. New order received to give pt 10mg of procardia now and repeat BP in 30 minutes. MD would also like to start pt on 200mg of labetalol to start at 2100 tonight. No other orders at this time. 1816-Nifedipine given now, see MAR. Writer will recheck pt's BP at 1845. Pt states she needs nothing else at this time. Pt sitting up in bed eating dinner. 1905-Bedside report given to ROBERTO Centeno RN.

## 2020-06-15 LAB
ABO + RH BLD: NORMAL
ABO + RH BLD: NORMAL
BLD PROD TYP BPU: NORMAL
BLOOD GROUP ANTIBODIES SERPL: NORMAL
BLOOD GROUP ANTIBODIES SERPL: NORMAL
BPU ID: NORMAL
CROSSMATCH RESULT,%XM: NORMAL
FETAL SCREEN,FMHS: NORMAL
SPECIMEN EXP DATE BLD: NORMAL
STATUS OF UNIT,%ST: NORMAL
UNIT DIVISION, %UDIV: 0
WEAK D AG RBC QL: NORMAL

## 2020-06-15 PROCEDURE — 74011250637 HC RX REV CODE- 250/637: Performed by: STUDENT IN AN ORGANIZED HEALTH CARE EDUCATION/TRAINING PROGRAM

## 2020-06-15 PROCEDURE — 74011250637 HC RX REV CODE- 250/637: Performed by: OBSTETRICS & GYNECOLOGY

## 2020-06-15 PROCEDURE — 77030021125

## 2020-06-15 PROCEDURE — 65270000029 HC RM PRIVATE

## 2020-06-15 RX ORDER — NIFEDIPINE 30 MG/1
30 TABLET, EXTENDED RELEASE ORAL DAILY
Status: DISCONTINUED | OUTPATIENT
Start: 2020-06-15 | End: 2020-06-16 | Stop reason: HOSPADM

## 2020-06-15 RX ORDER — LABETALOL 200 MG/1
TABLET, FILM COATED ORAL
Status: DISPENSED
Start: 2020-06-15 | End: 2020-06-15

## 2020-06-15 RX ORDER — NIFEDIPINE 10 MG/1
CAPSULE ORAL
Status: DISPENSED
Start: 2020-06-15 | End: 2020-06-15

## 2020-06-15 RX ORDER — LABETALOL 200 MG/1
400 TABLET, FILM COATED ORAL 2 TIMES DAILY
Status: DISCONTINUED | OUTPATIENT
Start: 2020-06-15 | End: 2020-06-16 | Stop reason: HOSPADM

## 2020-06-15 RX ORDER — NIFEDIPINE 10 MG/1
10 CAPSULE ORAL ONCE
Status: COMPLETED | OUTPATIENT
Start: 2020-06-15 | End: 2020-06-15

## 2020-06-15 RX ADMIN — FERROUS SULFATE TAB 325 MG (65 MG ELEMENTAL FE) 325 MG: 325 (65 FE) TAB at 08:05

## 2020-06-15 RX ADMIN — IBUPROFEN 800 MG: 800 TABLET ORAL at 16:12

## 2020-06-15 RX ADMIN — OXYCODONE AND ACETAMINOPHEN 1 TABLET: 5; 325 TABLET ORAL at 09:09

## 2020-06-15 RX ADMIN — HYDROCORTISONE ACETATE PRAMOXINE HCL: 2.5; 1 CREAM TOPICAL at 04:54

## 2020-06-15 RX ADMIN — NIFEDIPINE 30 MG: 30 TABLET, FILM COATED, EXTENDED RELEASE ORAL at 20:03

## 2020-06-15 RX ADMIN — OXYCODONE AND ACETAMINOPHEN 1 TABLET: 5; 325 TABLET ORAL at 04:46

## 2020-06-15 RX ADMIN — OXYCODONE AND ACETAMINOPHEN 1 TABLET: 5; 325 TABLET ORAL at 00:53

## 2020-06-15 RX ADMIN — NIFEDIPINE 10 MG: 10 CAPSULE ORAL at 11:05

## 2020-06-15 RX ADMIN — LABETALOL HYDROCHLORIDE 400 MG: 200 TABLET, FILM COATED ORAL at 19:40

## 2020-06-15 RX ADMIN — IBUPROFEN 800 MG: 800 TABLET ORAL at 23:57

## 2020-06-15 RX ADMIN — IBUPROFEN 800 MG: 800 TABLET ORAL at 08:04

## 2020-06-15 RX ADMIN — IBUPROFEN 800 MG: 800 TABLET ORAL at 00:53

## 2020-06-15 RX ADMIN — LABETALOL HYDROCHLORIDE 400 MG: 200 TABLET, FILM COATED ORAL at 08:03

## 2020-06-15 RX ADMIN — ACETAMINOPHEN 650 MG: 325 TABLET ORAL at 19:19

## 2020-06-15 RX ADMIN — FERROUS SULFATE TAB 325 MG (65 MG ELEMENTAL FE) 325 MG: 325 (65 FE) TAB at 16:12

## 2020-06-15 RX ADMIN — OXYCODONE AND ACETAMINOPHEN 1 TABLET: 5; 325 TABLET ORAL at 14:15

## 2020-06-15 RX ADMIN — Medication 1 TABLET: at 08:04

## 2020-06-15 NOTE — PROGRESS NOTES
1905: Bedside and Verbal shift change report given to [address]  (oncoming nurse) by Juany Weiss RN  (offgoing nurse). Report included the following information SBAR, Kardex, Intake/Output, MAR, Accordion, Recent Results and Med Rec Status. Assumed care of pt at this time. 2023: RN at bedside to perform assessment and vitals. Pt observed sleeping, rouses easily with touch. Assessment and vitals performed. VSS. Pt denies HA, visual disturbances, RUQ pain. Pt denies need of anything else at this time. 2100: Pt ambulates to NICU with FOB. 2132: Purposeful rounding performed in NICU. Pt observed sitting with FOB and infant. Pt denies need of anything at this time. 56: RN at bedside for medication administration. Pt denies need of anything at this time. 65: RN at bedside for medication administration. Pt states that her hemorrhoids are bothering her. RN asks if she is using Tucks pads, and pt states that she is not and is out of her cream. RN gets pt Tucks pads, dermaplast, and more Analpram cream.      0717: Bedside and Verbal shift change report given to Chavo Chavez RN (oncoming nurse) by Erica Hussein RN (offgoing nurse). Report included the following information SBAR, Kardex, Intake/Output, MAR, Accordion, Recent Results and Med Rec Status.

## 2020-06-15 NOTE — PROGRESS NOTES
PostPartum Note    Lisa Jarvis  [id number]  1990  31 y.o.    S:  Ms. Lisa Jarvis is a 31 y.o.  POD #3 s/p LTCS @ 33w4d. Doing well. She had a baby girl. Her lochia is like a period. She describes her pain as mild and is well controlled with PO medications. She is breast feeding and this is going well. She is ambulating and voiding. Tolerating PO intake. Denies dizziness, lightheadedness, no headaches, RUQ pain or worsening swelling. She is passing flatus. O:   Visit Vitals  BP (!) 166/98   Pulse 90   Temp 98.2 °F (36.8 °C)   Resp 16   Ht 5' 2\" (1.575 m)   Wt 78 kg (172 lb)   LMP 2019   SpO2 98%   Breastfeeding Unknown   BMI 31.46 kg/m²       Lab Results   Component Value Date/Time    WBC 13.3 (H) 2020 12:43 AM    HGB 6.9 (L) 2020 12:43 AM    HCT 21.1 (L) 2020 12:43 AM    PLATELET 134  12:43 AM    MCV 80.2 2020 12:43 AM    Hgb, External 11.7 2019    Hct, External 35.2 2019       Gen - No acute distress  Abdomen - Fundus firm, below the umbilicus, incision c/d/i  Ext - Warm, well perfused. Nontender, trace edema    A/P:  POD #3 s/p LTCS @ 33w4d, c/b severe preE doing well.       - increase labetalol to 400mg bid and will need to monitor one addn day given recent increases  - Routine PP instructions/ care discussed      Liz Denver, MD  [facility]

## 2020-06-15 NOTE — DISCHARGE INSTRUCTIONS
Discharge Instructions for  Section    Patient ID:  Nam Martinez  [id number]  31 y.o.  1990    Take Home Medications       Continue taking your prenatal vitamins if you are breastfeeding. Follow-up care is a key part of your treatment and safety. Be sure to keep all your scheduled appointments    Activity  1. Avoid heavy lifting greater than 10lbs for 2 weeks after your surgery  2. Pelvic rest for 6 weeks, ie, nothing in your vagina for 6 weeks  (no intercourse, tampons, or douching). No tubs for 6 weeks. 3. No driving for 2 weeks and until you are no longer taking prescription pain medications and you can put your foot on the break in a hurry   4. Limit climbing stairs to only when necessary the first 1-2 weeks. Hold the railing and do not carry your baby up and downstairs at first for safety   5. You may walk as tolerated, though be care to not over-do it. Walking will assist in overall healing, decrease constipation and bloating. Yodit Lance feel better more quickly with some daily movement. Diet  Regular diet as tolerated    Wound care  There are several types of incision closures. 1. If you have metal staples in place when you leave the hospital, please call your doctors office to schedule the staple removal as directed at discharge. 2. If you have steri strips covering your incision, you may remove them as they fall off or be sure to remove them about one week after your surgery. Removing them in the shower may be easier. 3. If you have Dermabond, or skin glue, covering your incision, it will fall off slowly in the next few weeks. You may remove it as it begins to peel off. Additional wound care  1. Clear or reddish drainage from your incision is normal.  It's best to leave it open to the air, but if there is drainage, you may cover the incision lightly with gauze, preferably without tape. 2.  Keep the incision clean and dry.     3. Numbness of the skin at or around your incision is normal and the feeling usually returns gradually. 4. Call your doctor if you have increasing drainage from your incision, an unpleasant odor, red streaks, an increase in pain, or if it appears to open. Pain Management  1. Continue prescription pain medication as written by discharging physician  2. Over the counter medications such as Tylenol and ibuprofen (Motrin or Advil) are also ideal.  These may be taken together or in alternating doses. You may  take the maximum dose:  Motrin or Advil (generic ibuprofen), either 3 tablets every 6 hours or 4 tablets every 8 hours. Your prescription medication conntains a narcotic mixed with Tylenol,so  you should not take any extra Tylenol or acetaminophen until you have reduced your prescription pain medication. The maximum dose is Tylenol or acetominophen 1000 mg every 6 hours (equivalent to 2 Extra Strength Tylenols every 6 hours). 3. After a few days, begin to replace the prescription medication with over the counter medications. Use the prescription medication if needed for more severe pain or at night. The prescription medication can be addictive if overused. 4. Add heating pad or sitz baths as needed. Constipation  1. Constipation is normal after surgery, especially while taking prescription narcotic pain medication. 2. Over the counter remedies including ducosate (Colace), take 1-2 capsules 1-2 times daily for soft stool as needed. You may also add/ try milk of magnesia or rectal remedies such as Dulcolax or Fleets enema. Recovery: What to Expect at Home  1. Fatigue is expected. Try to rest when you can and don't worry about doing housework or other tasks which can wait. 2. The soreness in your incision will improve significantly over the first 2 weeks, but it may take 6-8 weeks before you are completely recovered.   3. Back pain or general body aches or muscle soreness are expected and should improve with acetominophen or ibuprofen. 4. Leg swelling due to pregnancy and/or IV fluids given in the hospital will take about two weeks to resolve. 5. Most women experience some form of the \"Baby Blues\" after having a baby. Feeling emotional, tearful, frustrated, anxious, sad, and irritable some of the time is normal and go away after about 2 weeks. Adequate rest and help from your family will help. Take breaks from caring for the baby. Call your doctor if your symptoms seem severe, last more than 2 weeks, or seem to be getting worse instead of better. Get help immediately if you have thoughts of wanting to hurt yourself or others! Call your doctor or seek immediate medical care if you have:  Heavy vaginal bleeding, soaking through one or more pads an hour for several hours. Foul-smelling discharge from your vagina or incision. Consistent nausea and vomiting and cannot keep fluids down. Consistent pain that does not get better after you take pain medicine.   Sudden chest pain and shortness of breath  Signs of a blood clot: pain/ swelling/ increasing redness in your lower extremeties  Signs of infection: increased pain in your abdomen or vaginal area; red streaks, warmth, or tenderness of your breasts; fever of 100.5 F or greater

## 2020-06-15 NOTE — DISCHARGE SUMMARY
Patient ID:  Ligia Diamond  [id number]  38 y.o.  1990    Admit Date: 2020    Discharge Date: 2020     Admitting Physician: Teresa Tomlinson DO    Attending Physician: Silas Pina DO    Admission Diagnoses: Preeclampsia [O14.90] Pre-E with Severe Features at 33 weeks gestation, Anemia (chronic iron deficiency worsened by physiologic anemia of pregnancy)     Procedures for this admission: 1LTCS due to worsening Pre-E and pulmonary edema     Discharge Diagnoses: Same as above with 1LTCS producing a viable infant. Information for the patient's :  Yan Colon [[id number]]            Discharge Disposition:  Home    Discharge Condition:  Stable    Additional Diagnoses: Severe Pre-E and Anemia . Maternal Labs:   Lab Results   Component Value Date/Time    HBsAg, External Negative 2019    HIV, External Negative 2019    Rubella, External Immune 2019    RPR, External non-reactive 2019       Cord Blood Results:   Information for the patient's :  Yan Isaiah [[id number]]     Lab Results   Component Value Date/Time    ABO/Rh(D) O POSITIVE 2020 03:13 AM    MATEUSZ IgG NEG 2020 03:13 AM    Bilirubin if MATEUSZ pos: IF DIRECT JOSSY POSITIVE, BILIRUBIN TO FOLLOW 2020 03:13 AM           History of Present Illness:   OB History        13    Para   4    Term   3       1    AB   9    Living   4       SAB   9    TAB        Ectopic        Molar        Multiple   0    Live Births   4              Admitted for induction of labor. Hospital Course:   Patient was admitted as above and delivered via 1LTCS due to worsening Pre-E and pulmonary edema. Please the chart for details. The postpartum course was notable for symptomatic anemia requiring transfusion of 2u PRBCs on POD1 and titration of antihypertensive mendations. She was deemed stable for discharge home on day 4. Follow-up Care:  Follow up in 1wk for BP check         Signed:  Cara Powell DO  6/15/2020  10:12 AM

## 2020-06-15 NOTE — PROGRESS NOTES
9799: Bedside, Verbal and Written shift change report given to DAIJA Goode (oncoming nurse) by ROBERTO Lorenz, JAYNE (offgoing nurse). Report included the following information SBAR, Kardex, Intake/Output, MAR, Accordion and Recent Results. Pt sitting in bed, pumping. Pt SO at bedside. This RN to return to perform pt assessment when she is done pumping, pt verbalizing agreement and no complaints at this time. 3268: This RN calling Dr. Meg Nicolas to update on pt bp readings and determine POC. MD stating that she is about to walk onto unit to discuss pt status. 4580: Dr. Meg Nicolas reviewing pt bp readings at nurses station. MD DC increase dose to 400mg labetalol PO BID. MD not planning to discharge pt this morning due to bp    0803: Dr. Meg Nicolas at pt bedside discussing POC with pt and performing pt assessment. Pt verbalizing understanding and agreement with plan to increase labetalol dose to 400mg BID and to stay inpatient for additional day in order to better control bp prior to discharge. No concerns or additional orders by MD.    0830: This RN assessing pt bp per pt request so that she can go visit her daughter in NICU. BP of 163/95 noted. This RN to notify MD.    2017: This RN calling Dr. Meg Nicolas due to bp readings of 163/95 and pt request to do into NICU at this time to visit daughter. MD TORB pt to stay at bedside for bp retake in 30 min prior to NICU visit to confirm bp out of severe range. 2843: This RN discussing POC with pt, pt verbalizing understanding    0910: Pt visiting baby in NICU at this time    1059: This RN updating Dr. Meg Nicolas on elevated bp of 175/92 and retake of 167/91. MD TORB 10mg nifedipine PO at this time. 1105:  This RN discussing POC with pt, pt verbalizing understanding and agreement    1150: Pt visiting baby in NICU    1249: Pt still in NICU visiting with baby, verbalizing no needs or complaints at this time    1500: Pt in NICU visiting baby     [phone]: Pt ambulating back from NICU at this time    1915: Bedside, Verbal and Written shift change report given to CARLA Shaffer (oncoming nurse) by Rebecca Limon RN (offgoing nurse). Report included the following information SBAR, Kardex, Intake/Output, MAR, Accordion and Recent Results. Pt resting at bedside.  Leora Brito to administer labetalol PO

## 2020-06-15 NOTE — LACTATION NOTE
Reviewed breastfeeding basics:  Supply and demand,  stomach size, early  Feeding cues, skin to skin, positioning and baby led latch-on, assymetrical latch with signs of good, deep latch vs shallow, feeding frequency and duration, and log sheet for tracking infant feedings and output. Breastfeeding Booklet and Warm line information given. Discussed typical  weight loss and the importance of infant weight checks with pediatrician 1-2 post discharge. Mom states she is comfortable breast feeding. Has just started having her lunch and declined exam.   States she feels like her milk just came in and pump has been set up and that she is pumping about every 3 hours. Baby is in NICU and not able to nurse at this time. Discussed with mother her plan for feeding. Reviewed the benefits of exclusive breast milk feeding during the hospital stay. Informed her of the risks of using formula to supplement in the first few days of life as well as the benefits of successful breast milk feeding; referred her to the Breastfeeding booklet about this information. She acknowledges understanding of information reviewed and states that it is her plan to just nurse, but thinks baby may need to get formula  In NICU . Will support her choice and offer additional information as needed.

## 2020-06-15 NOTE — PROGRESS NOTES
1920 - Bedside and Verbal shift change report given to CARLA Le (oncoming nurse) by Elan Ashraf RN (offgoing nurse). Report included the following information SBAR, Kardex, OR Summary, Procedure Summary, Intake/Output, MAR and Recent Results. Assumed care of pt at this time. Pt just received tylenol from Elan Ashraf RN.    2283 - RN administering labetalol 400 mg PO at this time. 5 - RN at pt bedside performing shift assessment. Pt's BP elevated. RN to notify MD.    Consuelo Ivy notified Dr. Kathrin Govea of pt's 178/95 and 163/97 BPs. VORB from MD for nifedipine ER 30 mg PO daily. 2003 - RN at pt bedside administering nifedipine ER at this time. 2124 - Pt ambulating in hallway to NICU at this time. 2205 - Pt back in room from NICU at this time. 2357 - Pt c/o incisional pain. RN at pt bedside administering motrin at this time. 0020 - Pt ambulating to NICU to feed baby at this time. 0050 - Pt back in room from NICU at this time. 1 - RN at pt bedside checking on pt. Pt asleep upon RN entering room. 6723 - RN checking on pt. Pt asleep upon RN entering room; undisturbed. 0 - RN at pt bedside performing shift reassessment. Pt c/o pain. RN to administer percocet. 5 - Percocet given at this time. Pt states she is \"going to NICU soon to see baby\". 0600 - RN at pt bedside deflating and removing Cook catheter at this time.    0602 - RN at pt bedside disconnecting pt from [facility] at this time. Pt getting ready to take shower. Pt to call RN when back in bed.    0700 - Bedside and Verbal shift change report given to CHEIKH Harris RN (oncoming nurse) by CARLA Le (offgoing nurse). Report included the following information SBAR, Kardex, OR Summary, Procedure Summary, Intake/Output, MAR and Recent Results.

## 2020-06-16 VITALS
TEMPERATURE: 98.3 F | DIASTOLIC BLOOD PRESSURE: 75 MMHG | RESPIRATION RATE: 15 BRPM | WEIGHT: 172 LBS | BODY MASS INDEX: 31.65 KG/M2 | HEIGHT: 62 IN | SYSTOLIC BLOOD PRESSURE: 135 MMHG | OXYGEN SATURATION: 98 % | HEART RATE: 105 BPM

## 2020-06-16 PROCEDURE — 74011250637 HC RX REV CODE- 250/637: Performed by: OBSTETRICS & GYNECOLOGY

## 2020-06-16 RX ORDER — OXYCODONE AND ACETAMINOPHEN 5; 325 MG/1; MG/1
1 TABLET ORAL
Qty: 20 TAB | Refills: 0 | Status: SHIPPED | OUTPATIENT
Start: 2020-06-16 | End: 2020-06-23

## 2020-06-16 RX ORDER — LABETALOL 200 MG/1
400 TABLET, FILM COATED ORAL 2 TIMES DAILY
Qty: 120 TAB | Refills: 1 | Status: ON HOLD | OUTPATIENT
Start: 2020-06-16 | End: 2022-03-10

## 2020-06-16 RX ORDER — IBUPROFEN 600 MG/1
600 TABLET ORAL
Qty: 30 TAB | Refills: 0 | Status: ON HOLD | OUTPATIENT
Start: 2020-06-16 | End: 2022-01-11

## 2020-06-16 RX ORDER — NIFEDIPINE 30 MG/1
30 TABLET, EXTENDED RELEASE ORAL DAILY
Qty: 30 TAB | Refills: 1 | Status: ON HOLD | OUTPATIENT
Start: 2020-06-16 | End: 2022-03-10

## 2020-06-16 RX ADMIN — IBUPROFEN 800 MG: 800 TABLET ORAL at 08:34

## 2020-06-16 RX ADMIN — Medication 1 TABLET: at 08:34

## 2020-06-16 RX ADMIN — FERROUS SULFATE TAB 325 MG (65 MG ELEMENTAL FE) 325 MG: 325 (65 FE) TAB at 08:35

## 2020-06-16 RX ADMIN — LABETALOL HYDROCHLORIDE 400 MG: 200 TABLET, FILM COATED ORAL at 08:35

## 2020-06-16 RX ADMIN — OXYCODONE AND ACETAMINOPHEN 1 TABLET: 5; 325 TABLET ORAL at 05:10

## 2020-06-16 NOTE — PROGRESS NOTES
0700 Verbal SBAR received from CARLA Zuniga RN. Patient care assumed at this time. Patient currently ambulating back from NICU visiting baby. Will give patient time to get settled in her room before assessment. 0741 Morning assessment completed. States she is sore, but overall doing well. RN will return shortly for morning medications. 9242 RN at bedside asking patient about her PTA medications as Dr. Breana Hernandez is putting in discharge orders. Patient eating breakfast. Patient to get repeat BP and may be discharged this afternoon if BP remain WNL. 4135 Patient in NICU visiting . 1030 Patient sitting up at side of the bed filling out NICU paperwork. Denies any needs. Gen Krishnamurthy at bedside. Assessing BP. No complaints. Expressing readiness to go home. 1139 Reviewed BP with Dr. Breana Hernandez. MD states to discharge patient home. 1153 Discussed discharge teaching at length with patient including activity, prescribed diet, medication teaching, pain management, wound care from , recovery, baby blues, and instructed patient to follow up in one week from discharge in office. Patient states understanding. Signed copy of discharge teaching placed on hard chart. 1200 Discharged home in stable condition. Patient plans to stay on unit to visit with baby for a while.

## 2020-06-16 NOTE — PROGRESS NOTES
Spiritual Care Assessment/Progress Note  [address]      NAME: Jose Luis Pham      MRN: [id number]  AGE: 31 y.o. SEX: female  Caodaism Affiliation: Savage Prabhakar   Language: English     6/16/2020     Total Time (in minutes): 15     Spiritual Assessment begun in [facility] 2 LABOR & DELIVERY through conversation with:         [x]Patient        [x] Family    [] Friend(s)        Reason for Consult: Initial/Spiritual assessment, patient floor     Spiritual beliefs: (Please include comment if needed)     [x] Identifies with a vanna tradition:  Did not define       [] Supported by a vanna community:            [] Claims no spiritual orientation:           [] Seeking spiritual identity:                [] Adheres to an individual form of spirituality:           [] Not able to assess:                           Identified resources for coping:      [] Prayer                               [] Music                  [] Guided Imagery     [x] Family/friends                 [] Pet visits     [] Devotional reading                         [] Unknown     [] Other:                                           Interventions offered during this visit: (See comments for more details)    Patient Interventions: Affirmation of emotions/emotional suffering, Iconic (affirming the presence of God/Higher Power), Initial/Spiritual assessment, patient floor, Prayer (assurance of)     Family/Friend(s):  Affirmation of emotions/emotional suffering, Iconic (affirming the presence of God/Higher Power), Prayer (assurance of)     Plan of Care:     [] Support spiritual and/or cultural needs    [] Support AMD and/or advance care planning process      [] Support grieving process   [] Coordinate Rites and/or Rituals    [] Coordination with community clergy   [] No spiritual needs identified at this time   [] Detailed Plan of Care below (See Comments)  [] Make referral to Music Therapy  [] Make referral to Pet Therapy     [] Make referral to Addiction services  [] Make referral to [facility]  [] Make referral to Spiritual Care Partner  [] No future visits requested        [x] Follow up visits as needed     Comments:  Initial spiritual assessment in [facility]. Miss Nabil Gaviria and the father of baby boy Larayne Meckel who is in the NICU were present. They indicated they have spiritual beliefs and would be fine with  praying with their son Larayne Meckel. THey shared they are hopeful to bring Larayne Meckel home early next week. Miss Nabil Gaviria shared she has 4 other children at [facility], her parents are looking after them. Provided spiritual presence and assurance of prayer. Advised of  Availability.   Visited by: Skinny Dominguez., MS., [address] [facility] [address] ([id number])

## 2020-06-16 NOTE — PROGRESS NOTES
Post-Operative  Day [address] [employer]       Patient doing well without significant complaints. Tolerating diet, passing flatus, voiding and ambulating without difficulty    Vitals:  Visit Vitals  /87 (BP 1 Location: Left arm, BP Patient Position: At rest)   Pulse (!) 101   Temp 98.4 °F (36.9 °C)   Resp 14   Ht 5' 2\" (1.575 m)   Wt 78 kg (172 lb)   LMP 2019   SpO2 98%   Breastfeeding Unknown   BMI 31.46 kg/m²     Temp (24hrs), Av.3 °F (36.8 °C), Min:98 °F (36.7 °C), Max:98.4 °F (36.9 °C)        Exam:     Feeding: breast     Patient without distress. Abdomen: soft, expected tenderness, fundus firm                Wound incision clean, dry and intact               Lower extremities are nontender with/without edema.   No cords    Labs:   Lab Results   Component Value Date/Time    WBC 13.3 (H) 2020 12:43 AM    WBC 14.4 (H) 2020 03:59 AM    WBC 10.6 2020 11:10 AM    WBC 7.8 2020 05:00 PM    WBC 8.6 2020 05:10 PM    WBC 6.6 2020 07:06 PM    WBC 8.9 2020 11:12 AM    WBC 10.8 2020 01:43 PM    WBC 7.1 2019 09:41 PM    WBC 7.5 2019 02:45 PM    WBC 5.7 2010 03:00 PM    HGB 6.9 (L) 2020 12:43 AM    HGB 6.1 (L) 2020 04:26 PM    HGB 7.1 (L) 2020 03:59 AM    HGB 7.0 (L) 2020 11:10 AM    HGB 7.5 (L) 2020 05:00 PM    HGB 8.7 (L) 2020 05:10 PM    HGB 12.0 2020 07:06 PM    HGB 11.8 2020 11:12 AM    HGB 12.1 2020 01:43 PM    HGB 12.0 2019 09:41 PM    HGB 12.4 2019 02:45 PM    HGB 11.4 (L) 2010 03:00 PM    HCT 21.1 (L) 2020 12:43 AM    HCT 19.6 (L) 2020 04:26 PM    HCT 22.6 (L) 2020 03:59 AM    HCT 22.4 (L) 2020 11:10 AM    HCT 23.6 (L) 2020 05:00 PM    HCT 26.2 (L) 2020 05:10 PM    HCT 34.9 (L) 2020 07:06 PM    HCT 34.5 (L) 2020 11:12 AM    HCT 35.5 2020 01:43 PM    HCT 35.5 2019 09:41 PM    HCT 38.0 06/23/2019 02:45 PM    HCT 32.6 (L) 12/09/2010 03:00 PM    PLATELET 512 77/19/6594 12:43 AM    PLATELET 162 13/74/4069 03:59 AM    PLATELET 265 30/51/7604 11:10 AM    PLATELET 151 54/64/2059 05:00 PM    PLATELET 486 11/90/1757 05:10 PM    PLATELET 971 37/22/2701 07:06 PM    PLATELET 022 22/63/6244 11:12 AM    PLATELET 683 08/03/4470 01:43 PM    PLATELET 184 73/83/9267 09:41 PM    PLATELET 858 14/61/0157 02:45 PM    PLATELET 667 84/32/9469 03:00 PM    Hgb, External 11.7 12/18/2019    Hct, External 35.2 12/18/2019       No results found for this or any previous visit (from the past 24 hour(s)). Assessment: Post-Op day 4, doing well      Plan:   1. Discharge home today after assessing BPs by midday. Nifedipine added last evening which has helped stabilize bps.  2. Instructions given- pelvic rest, restrictions on driving and lifting, wound care instructions, follow-up  3.  Discharge Medications: see discharge instruction sheet

## 2020-06-16 NOTE — PROGRESS NOTES
9722 This RN bedside with Dr. Carley Pinto. MD states that if patients repeat blood pressure remains WNL that patient can be discharged home. MD states to repeat blood pressure in 4 hours from initial blood pressure. 4495 This RN bedside on behalf of Maritza Vaughn primary RN. Morning medications administered, see MAR.

## 2022-01-09 ENCOUNTER — APPOINTMENT (OUTPATIENT)
Dept: ULTRASOUND IMAGING | Age: 32
DRG: 566 | End: 2022-01-09
Attending: EMERGENCY MEDICINE
Payer: MEDICAID

## 2022-01-09 ENCOUNTER — HOSPITAL ENCOUNTER (EMERGENCY)
Age: 32
Discharge: HOME | DRG: 566 | End: 2022-01-09
Attending: EMERGENCY MEDICINE
Payer: MEDICAID

## 2022-01-09 VITALS
OXYGEN SATURATION: 99 % | HEIGHT: 62 IN | HEART RATE: 72 BPM | TEMPERATURE: 98.1 F | BODY MASS INDEX: 27.6 KG/M2 | RESPIRATION RATE: 18 BRPM | WEIGHT: 150 LBS | SYSTOLIC BLOOD PRESSURE: 150 MMHG | DIASTOLIC BLOOD PRESSURE: 85 MMHG

## 2022-01-09 DIAGNOSIS — K80.20 GALLSTONES: Primary | ICD-10-CM

## 2022-01-09 LAB
ALBUMIN SERPL-MCNC: 3 G/DL (ref 3.5–5)
ALBUMIN/GLOB SERPL: 0.9 (ref 1.1–2.2)
ALP SERPL-CCNC: 98 U/L (ref 45–117)
ALT SERPL-CCNC: 13 U/L (ref 12–78)
ANION GAP SERPL CALC-SCNC: 9 MMOL/L (ref 5–15)
APPEARANCE UR: CLEAR
AST SERPL W P-5'-P-CCNC: 10 U/L (ref 15–37)
BACTERIA URNS QL MICRO: NEGATIVE /HPF
BASOPHILS # BLD: 0 K/UL (ref 0–0.1)
BASOPHILS NFR BLD: 0 % (ref 0–1)
BILIRUB SERPL-MCNC: 0.2 MG/DL (ref 0.2–1)
BILIRUB UR QL: NEGATIVE
BUN SERPL-MCNC: 7 MG/DL (ref 6–20)
BUN/CREAT SERPL: 11 (ref 12–20)
CA-I BLD-MCNC: 9.6 MG/DL (ref 8.5–10.1)
CHLORIDE SERPL-SCNC: 104 MMOL/L (ref 97–108)
CO2 SERPL-SCNC: 22 MMOL/L (ref 21–32)
COLOR UR: ABNORMAL
CREAT SERPL-MCNC: 0.62 MG/DL (ref 0.55–1.02)
DIFFERENTIAL METHOD BLD: ABNORMAL
EOSINOPHIL # BLD: 0 K/UL (ref 0–0.4)
EOSINOPHIL NFR BLD: 0 % (ref 0–7)
ERYTHROCYTE [DISTWIDTH] IN BLOOD BY AUTOMATED COUNT: 12.6 % (ref 11.5–14.5)
GLOBULIN SER CALC-MCNC: 3.5 G/DL (ref 2–4)
GLUCOSE SERPL-MCNC: 133 MG/DL (ref 65–100)
GLUCOSE UR STRIP.AUTO-MCNC: NEGATIVE MG/DL
HCT VFR BLD AUTO: 35.8 % (ref 35–47)
HGB BLD-MCNC: 12.9 G/DL (ref 11.5–16)
HGB UR QL STRIP: NEGATIVE
IMM GRANULOCYTES # BLD AUTO: 0.1 K/UL (ref 0–0.04)
IMM GRANULOCYTES NFR BLD AUTO: 1 % (ref 0–0.5)
KETONES UR QL STRIP.AUTO: 20 MG/DL
LEUKOCYTE ESTERASE UR QL STRIP.AUTO: NEGATIVE
LIPASE SERPL-CCNC: 70 U/L (ref 73–393)
LYMPHOCYTES # BLD: 0.8 K/UL (ref 0.8–3.5)
LYMPHOCYTES NFR BLD: 7 % (ref 12–49)
MCH RBC QN AUTO: 29.7 PG (ref 26–34)
MCHC RBC AUTO-ENTMCNC: 36 G/DL (ref 30–36.5)
MCV RBC AUTO: 82.3 FL (ref 80–99)
MONOCYTES # BLD: 0.3 K/UL (ref 0–1)
MONOCYTES NFR BLD: 3 % (ref 5–13)
MUCOUS THREADS URNS QL MICRO: ABNORMAL /LPF
NEUTS SEG # BLD: 9.8 K/UL (ref 1.8–8)
NEUTS SEG NFR BLD: 89 % (ref 32–75)
NITRITE UR QL STRIP.AUTO: NEGATIVE
NRBC # BLD: 0 K/UL (ref 0–0.01)
NRBC BLD-RTO: 0 PER 100 WBC
PH UR STRIP: 6 (ref 5–8)
PLATELET # BLD AUTO: 245 K/UL (ref 150–400)
PMV BLD AUTO: 9.6 FL (ref 8.9–12.9)
POTASSIUM SERPL-SCNC: 3.7 MMOL/L (ref 3.5–5.1)
PROT SERPL-MCNC: 6.5 G/DL (ref 6.4–8.2)
PROT UR STRIP-MCNC: NEGATIVE MG/DL
RBC # BLD AUTO: 4.35 M/UL (ref 3.8–5.2)
RBC #/AREA URNS HPF: ABNORMAL /HPF (ref 0–5)
SODIUM SERPL-SCNC: 135 MMOL/L (ref 136–145)
SP GR UR REFRACTOMETRY: 1.02 (ref 1–1.03)
UA: UC IF INDICATED,UAUC: ABNORMAL
UROBILINOGEN UR QL STRIP.AUTO: 0.1 EU/DL (ref 0.1–1)
WBC # BLD AUTO: 11 K/UL (ref 3.6–11)
WBC URNS QL MICRO: ABNORMAL /HPF (ref 0–4)

## 2022-01-09 PROCEDURE — 36415 COLL VENOUS BLD VENIPUNCTURE: CPT

## 2022-01-09 PROCEDURE — 83690 ASSAY OF LIPASE: CPT

## 2022-01-09 PROCEDURE — 99283 EMERGENCY DEPT VISIT LOW MDM: CPT

## 2022-01-09 PROCEDURE — 81001 URINALYSIS AUTO W/SCOPE: CPT

## 2022-01-09 PROCEDURE — 76705 ECHO EXAM OF ABDOMEN: CPT

## 2022-01-09 PROCEDURE — 74011250636 HC RX REV CODE- 250/636: Performed by: EMERGENCY MEDICINE

## 2022-01-09 PROCEDURE — 74011250637 HC RX REV CODE- 250/637: Performed by: EMERGENCY MEDICINE

## 2022-01-09 PROCEDURE — 93005 ELECTROCARDIOGRAM TRACING: CPT

## 2022-01-09 PROCEDURE — 80053 COMPREHEN METABOLIC PANEL: CPT

## 2022-01-09 PROCEDURE — 96374 THER/PROPH/DIAG INJ IV PUSH: CPT

## 2022-01-09 PROCEDURE — 96375 TX/PRO/DX INJ NEW DRUG ADDON: CPT

## 2022-01-09 PROCEDURE — 85025 COMPLETE CBC W/AUTO DIFF WBC: CPT

## 2022-01-09 RX ORDER — DIPHENHYDRAMINE HYDROCHLORIDE 50 MG/ML
50 INJECTION, SOLUTION INTRAMUSCULAR; INTRAVENOUS ONCE
Status: COMPLETED | OUTPATIENT
Start: 2022-01-09 | End: 2022-01-09

## 2022-01-09 RX ORDER — METOCLOPRAMIDE HYDROCHLORIDE 5 MG/ML
10 INJECTION INTRAMUSCULAR; INTRAVENOUS
Status: COMPLETED | OUTPATIENT
Start: 2022-01-09 | End: 2022-01-09

## 2022-01-09 RX ORDER — ACETAMINOPHEN 500 MG
1000 TABLET ORAL ONCE
Status: COMPLETED | OUTPATIENT
Start: 2022-01-09 | End: 2022-01-09

## 2022-01-09 RX ORDER — PROMETHAZINE HYDROCHLORIDE 25 MG/1
25 TABLET ORAL
Qty: 12 TABLET | Refills: 0 | Status: ON HOLD | OUTPATIENT
Start: 2022-01-09 | End: 2022-01-13

## 2022-01-09 RX ORDER — ONDANSETRON 2 MG/ML
4 INJECTION INTRAMUSCULAR; INTRAVENOUS
Status: COMPLETED | OUTPATIENT
Start: 2022-01-09 | End: 2022-01-09

## 2022-01-09 RX ADMIN — ONDANSETRON 4 MG: 2 INJECTION INTRAMUSCULAR; INTRAVENOUS at 07:27

## 2022-01-09 RX ADMIN — ACETAMINOPHEN 1000 MG: 500 TABLET ORAL at 08:34

## 2022-01-09 RX ADMIN — METOCLOPRAMIDE HYDROCHLORIDE 10 MG: 5 INJECTION INTRAMUSCULAR; INTRAVENOUS at 08:34

## 2022-01-09 RX ADMIN — SODIUM CHLORIDE 1000 ML: 9 INJECTION, SOLUTION INTRAVENOUS at 07:26

## 2022-01-09 RX ADMIN — DIPHENHYDRAMINE HYDROCHLORIDE 50 MG: 50 INJECTION, SOLUTION INTRAMUSCULAR; INTRAVENOUS at 08:34

## 2022-01-09 NOTE — ED TRIAGE NOTES
Reports severe pain in chest, abd, and back. Reports recently had severe morning sickness but that has improved. PT is 13 wks pregnant. Vomiting only bile at this point.

## 2022-01-09 NOTE — DISCHARGE INSTRUCTIONS
Thank you! Thank you for allowing me to care for you in the emergency department. I sincerely hope that you are satisfied with your visit today. It is my goal to provide you with excellent care. Below you will find a list of your labs and imaging from your visit today. Should you have any questions regarding these results please do not hesitate to call the emergency department. Labs -     Recent Results (from the past 12 hour(s))   CBC WITH AUTOMATED DIFF    Collection Time: 01/09/22  7:23 AM   Result Value Ref Range    WBC 11.0 3.6 - 11.0 K/uL    RBC 4.35 3.80 - 5.20 M/uL    HGB 12.9 11.5 - 16.0 g/dL    HCT 35.8 35.0 - 47.0 %    MCV 82.3 80.0 - 99.0 FL    MCH 29.7 26.0 - 34.0 PG    MCHC 36.0 30.0 - 36.5 g/dL    RDW 12.6 11.5 - 14.5 %    PLATELET 267 811 - 590 K/uL    MPV 9.6 8.9 - 12.9 FL    NRBC 0.0 0.0  WBC    ABSOLUTE NRBC 0.00 0.00 - 0.01 K/uL    NEUTROPHILS 89 (H) 32 - 75 %    LYMPHOCYTES 7 (L) 12 - 49 %    MONOCYTES 3 (L) 5 - 13 %    EOSINOPHILS 0 0 - 7 %    BASOPHILS 0 0 - 1 %    IMMATURE GRANULOCYTES 1 (H) 0 - 0.5 %    ABS. NEUTROPHILS 9.8 (H) 1.8 - 8.0 K/UL    ABS. LYMPHOCYTES 0.8 0.8 - 3.5 K/UL    ABS. MONOCYTES 0.3 0.0 - 1.0 K/UL    ABS. EOSINOPHILS 0.0 0.0 - 0.4 K/UL    ABS. BASOPHILS 0.0 0.0 - 0.1 K/UL    ABS. IMM. GRANS. 0.1 (H) 0.00 - 0.04 K/UL    DF AUTOMATED     METABOLIC PANEL, COMPREHENSIVE    Collection Time: 01/09/22  7:23 AM   Result Value Ref Range    Sodium 135 (L) 136 - 145 mmol/L    Potassium 3.7 3.5 - 5.1 mmol/L    Chloride 104 97 - 108 mmol/L    CO2 22 21 - 32 mmol/L    Anion gap 9 5 - 15 mmol/L    Glucose 133 (H) 65 - 100 mg/dL    BUN 7 6 - 20 mg/dL    Creatinine 0.62 0.55 - 1.02 mg/dL    BUN/Creatinine ratio 11 (L) 12 - 20      GFR est AA >60 >60 ml/min/1.73m2    GFR est non-AA >60 >60 ml/min/1.73m2    Calcium 9.6 8.5 - 10.1 mg/dL    Bilirubin, total 0.2 0.2 - 1.0 mg/dL    AST (SGOT) 10 (L) 15 - 37 U/L    ALT (SGPT) 13 12 - 78 U/L    Alk.  phosphatase 98 45 - 117 U/L Protein, total 6.5 6.4 - 8.2 g/dL    Albumin 3.0 (L) 3.5 - 5.0 g/dL    Globulin 3.5 2.0 - 4.0 g/dL    A-G Ratio 0.9 (L) 1.1 - 2.2     LIPASE    Collection Time: 01/09/22  7:23 AM   Result Value Ref Range    Lipase 70 (L) 73 - 393 U/L   URINALYSIS W/ REFLEX CULTURE    Collection Time: 01/09/22  9:37 AM    Specimen: Urine   Result Value Ref Range    Color Yellow/Straw      Appearance Clear Clear      Specific gravity 1.017 1.003 - 1.030      pH (UA) 6.0 5.0 - 8.0      Protein Negative Negative mg/dL    Glucose Negative Negative mg/dL    Ketone 20 (A) Negative mg/dL    Bilirubin Negative Negative      Blood Negative Negative      Urobilinogen 0.1 0.1 - 1.0 EU/dL    Nitrites Negative Negative      Leukocyte Esterase Negative Negative      UA:UC IF INDICATED Culture not indicated by UA result Culture not indicated by UA result      WBC 0-4 0 - 4 /hpf    RBC 0-5 0 - 5 /hpf    Bacteria Negative Negative /hpf    Mucus Trace /lpf       Radiologic Studies -   US GALLBLADDER   Final Result   Cholelithiasis. Otherwise, unremarkable right upper quadrant ultrasound. CT Results  (Last 48 hours)      None          CXR Results  (Last 48 hours)      None               If you feel that you have not received excellent quality care or timely care, please ask to speak to the nurse manager. Please choose us in the future for your continued health care needs. ------------------------------------------------------------------------------------------------------------  The exam and treatment you received in the Emergency Department were for an urgent problem and are not intended as complete care. It is important that you follow-up with a doctor, nurse practitioner, or physician assistant to:  (1) confirm your diagnosis,  (2) re-evaluation of changes in your illness and treatment, and  (3) for ongoing care.   If your symptoms become worse or you do not improve as expected and you are unable to reach your usual health care provider, you should return to the Emergency Department. We are available 24 hours a day. Please take your discharge instructions with you when you go to your follow-up appointment. If you have any problem arranging a follow-up appointment, contact the Emergency Department immediately. If a prescription has been provided, please have it filled as soon as possible to prevent a delay in treatment. Read the entire medication instruction sheet provided to you by the pharmacy. If you have any questions or reservations about taking the medication due to side effects or interactions with other medications, please call your primary care physician or contact the ER to speak with the charge nurse. Make an appointment with your family doctor or the physician you were referred to for follow-up of this visit as instructed on your discharge paperwork, as this is a mandatory follow-up. Return to the ER if you are unable to be seen or if you are unable to be seen in a timely manner. If you have any problem arranging the follow-up visit, contact the Emergency Department immediately. 79

## 2022-01-09 NOTE — ED PROVIDER NOTES
EMERGENCY DEPARTMENT HISTORY AND PHYSICAL EXAM        Date: 2022  Patient Name: Jessica Conroy    History of Presenting Illness     Chief Complaint   Patient presents with    Abdominal Pain    Chest Pain    Back Pain     History Provided By: Patient    HPI: Jessica Conroy, 27 y.o. female with history of bipolar disorder, gestational hypertension, kidney stones, PTSD, and anemia who is currently 13 weeks pregnant who presents with abdominal pain. States that symptoms started at 3 AM.  Pain is in the upper abdomen, constant, severe, and radiates to her back and chest.  No abnormal vaginal discharge or bleeding. She has been having some vomiting. No diarrhea or constipation. PCP: Magdaleno Matson NP    Current Outpatient Medications   Medication Sig Dispense Refill    promethazine (PHENERGAN) 25 mg tablet Take 1 Tablet by mouth every six (6) hours as needed for Nausea for up to 3 days. 12 Tablet 0    labetaloL (NORMODYNE) 200 mg tablet Take 2 Tabs by mouth two (2) times a day. Indications: high blood pressure 120 Tab 1    ibuprofen (MOTRIN) 600 mg tablet Take 1 Tab by mouth every six (6) hours as needed for Pain. 30 Tab 0    NIFEdipine ER (PROCARDIA XL) 30 mg ER tablet Take 1 Tab by mouth daily. Indications: high blood pressure 30 Tab 1    prenatal 26/CIEB fum/folic/dha (PRENATAL-1 PO) Take  by mouth.          Past History     Past Medical History:  Past Medical History:   Diagnosis Date    Abnormal Papanicolaou smear of cervix     Anemia     Bipolar disorder ([facility] [address]     Gestational hypertension     Hypertension in pregnancy     pre-E with prior pregnancy    Ill-defined condition 2019    right shoulder injury/pain    Kidney disease     kidney stones    Postpartum depression     PTSD (post-traumatic stress disorder)     Trauma        Past Surgical History:  Past Surgical History:   Procedure Laterality Date    HX APPENDECTOMY      HX  SECTION      HX LEEP PROCEDURE 06/2019    HX OTHER SURGICAL      HX UROLOGICAL      kidney stones       Family History:  Family History   Problem Relation Age of Onset    Stroke Mother     Hypertension Mother     Hypertension Father     Mult Sclerosis Father        Social History:  Social History     Tobacco Use    Smoking status: Never Smoker    Smokeless tobacco: Never Used   Substance Use Topics    Alcohol use: Not Currently    Drug use: Never       Allergies: Allergies   Allergen Reactions    Latex Itching and Swelling    Iodine Itching and Swelling     Review of Systems   Review of Systems   Constitutional: Negative for fever. HENT: Negative for congestion. Eyes: Negative for visual disturbance. Respiratory: Negative for shortness of breath. Cardiovascular: Positive for chest pain. Gastrointestinal: Positive for abdominal pain and nausea. Genitourinary: Negative for dysuria. Musculoskeletal: Positive for back pain. Negative for arthralgias. Skin: Negative for rash. Neurological: Negative for headaches. Physical Exam   Constitutional: No acute distress. Well-nourished. Skin: No rash. ENT: No rhinorrhea. No cough. Head is normocephalic and atraumatic. Eye: No proptosis or conjunctival injections. Respiratory: No apparent respiratory distress. Gastrointestinal: Nondistended. Tenderness to the epigastric region of the abdomen without rebound or guarding. Negative [place] sign. Musculoskeletal: No obvious bony deformities. Cardio: Normal peripheral perfusion.     Diagnostic Study Results     Labs -     Recent Results (from the past 24 hour(s))   CBC WITH AUTOMATED DIFF    Collection Time: 01/09/22  7:23 AM   Result Value Ref Range    WBC 11.0 3.6 - 11.0 K/uL    RBC 4.35 3.80 - 5.20 M/uL    HGB 12.9 11.5 - 16.0 g/dL    HCT 35.8 35.0 - 47.0 %    MCV 82.3 80.0 - 99.0 FL    MCH 29.7 26.0 - 34.0 PG    MCHC 36.0 30.0 - 36.5 g/dL    RDW 12.6 11.5 - 14.5 %    PLATELET 488 568 - 038 K/uL    MPV 9.6 8.9 - 12.9 FL    NRBC 0.0 0.0  WBC    ABSOLUTE NRBC 0.00 0.00 - 0.01 K/uL    NEUTROPHILS 89 (H) 32 - 75 %    LYMPHOCYTES 7 (L) 12 - 49 %    MONOCYTES 3 (L) 5 - 13 %    EOSINOPHILS 0 0 - 7 %    BASOPHILS 0 0 - 1 %    IMMATURE GRANULOCYTES 1 (H) 0 - 0.5 %    ABS. NEUTROPHILS 9.8 (H) 1.8 - 8.0 K/UL    ABS. LYMPHOCYTES 0.8 0.8 - 3.5 K/UL    ABS. MONOCYTES 0.3 0.0 - 1.0 K/UL    ABS. EOSINOPHILS 0.0 0.0 - 0.4 K/UL    ABS. BASOPHILS 0.0 0.0 - 0.1 K/UL    ABS. IMM. GRANS. 0.1 (H) 0.00 - 0.04 K/UL    DF AUTOMATED     METABOLIC PANEL, COMPREHENSIVE    Collection Time: 01/09/22  7:23 AM   Result Value Ref Range    Sodium 135 (L) 136 - 145 mmol/L    Potassium 3.7 3.5 - 5.1 mmol/L    Chloride 104 97 - 108 mmol/L    CO2 22 21 - 32 mmol/L    Anion gap 9 5 - 15 mmol/L    Glucose 133 (H) 65 - 100 mg/dL    BUN 7 6 - 20 mg/dL    Creatinine 0.62 0.55 - 1.02 mg/dL    BUN/Creatinine ratio 11 (L) 12 - 20      GFR est AA >60 >60 ml/min/1.73m2    GFR est non-AA >60 >60 ml/min/1.73m2    Calcium 9.6 8.5 - 10.1 mg/dL    Bilirubin, total 0.2 0.2 - 1.0 mg/dL    AST (SGOT) 10 (L) 15 - 37 U/L    ALT (SGPT) 13 12 - 78 U/L    Alk.  phosphatase 98 45 - 117 U/L    Protein, total 6.5 6.4 - 8.2 g/dL    Albumin 3.0 (L) 3.5 - 5.0 g/dL    Globulin 3.5 2.0 - 4.0 g/dL    A-G Ratio 0.9 (L) 1.1 - 2.2     LIPASE    Collection Time: 01/09/22  7:23 AM   Result Value Ref Range    Lipase 70 (L) 73 - 393 U/L   URINALYSIS W/ REFLEX CULTURE    Collection Time: 01/09/22  9:37 AM    Specimen: Urine   Result Value Ref Range    Color Yellow/Straw      Appearance Clear Clear      Specific gravity 1.017 1.003 - 1.030      pH (UA) 6.0 5.0 - 8.0      Protein Negative Negative mg/dL    Glucose Negative Negative mg/dL    Ketone 20 (A) Negative mg/dL    Bilirubin Negative Negative      Blood Negative Negative      Urobilinogen 0.1 0.1 - 1.0 EU/dL    Nitrites Negative Negative      Leukocyte Esterase Negative Negative      UA:UC IF INDICATED Culture not indicated by UA result Culture not indicated by UA result      WBC 0-4 0 - 4 /hpf    RBC 0-5 0 - 5 /hpf    Bacteria Negative Negative /hpf    Mucus Trace /lpf       Radiologic Studies -   US GALLBLADDER   Final Result   Cholelithiasis. Otherwise, unremarkable right upper quadrant ultrasound. CT Results  (Last 48 hours)    None        CXR Results  (Last 48 hours)    None          Medical Decision Making and ED Course     I reviewed the available vital signs, nursing notes, past medical history, past surgical history, family history, and social history. Vital Signs - Reviewed the patient's vital signs. Patient Vitals for the past 12 hrs:   Temp Pulse Resp BP SpO2   01/09/22 0654 98.1 °F (36.7 °C) 72 18 (!) 150/85 99 %     EKG: Normal sinus rhythm at rate of 74 bpm.  Normal SC interval, QRS duration, QTc interval.  No ST segment abnormalities. Normal axis. Medical Decision Making:   Presented with abdominal pain. The differential diagnosis is pancreatitis, gallstones, acute cholecystitis. Work-up shows gallstones were otherwise negative. No white count. I feel no need for CT scan. Patient reassured and discharged after dose of Reglan and Benadryl. We will have her follow-up with a surgeon to discuss gallstones. Disposition     Discharged    DISCHARGE PLAN:  1. Current Discharge Medication List      CONTINUE these medications which have NOT CHANGED    Details   labetaloL (NORMODYNE) 200 mg tablet Take 2 Tabs by mouth two (2) times a day. Indications: high blood pressure  Qty: 120 Tab, Refills: 1      ibuprofen (MOTRIN) 600 mg tablet Take 1 Tab by mouth every six (6) hours as needed for Pain. Qty: 30 Tab, Refills: 0      NIFEdipine ER (PROCARDIA XL) 30 mg ER tablet Take 1 Tab by mouth daily. Indications: high blood pressure  Qty: 30 Tab, Refills: 1      prenatal 49/QCIC fum/folic/dha (PRENATAL-1 PO) Take  by mouth.            2.   Follow-up Information     Follow up With Specialties Details Why Contact Info    [facility] EMERGENCY DEPT Emergency Medicine Go today As soon as possible if symptoms worsen [address] [facility] [address]  [phone]    Lydia Aponte MD General Surgery Schedule an appointment as soon as possible for a visit  This is a surgeon. Discuss your gallstones. [address]  [address] [address],[address]  [phone]          3. Return to ED if worse     Diagnosis     Clinical impression:   1. Gallstones           Attestation:  Please note that this dictation was completed with [employer], the computer voice recognition software. Quite often unanticipated grammatical, syntax, homophones, and other interpretive errors are inadvertently transcribed by the computer software. Please disregard these errors. Please excuse any errors that have escaped final proofreading. Thank you.   Hussein Decker, DO

## 2022-01-10 ENCOUNTER — APPOINTMENT (OUTPATIENT)
Dept: ULTRASOUND IMAGING | Age: 32
DRG: 566 | End: 2022-01-10
Attending: OBSTETRICS & GYNECOLOGY
Payer: MEDICAID

## 2022-01-10 ENCOUNTER — HOSPITAL ENCOUNTER (INPATIENT)
Age: 32
LOS: 3 days | Discharge: HOME | DRG: 566 | End: 2022-01-13
Attending: EMERGENCY MEDICINE | Admitting: INTERNAL MEDICINE
Payer: MEDICAID

## 2022-01-10 ENCOUNTER — APPOINTMENT (OUTPATIENT)
Dept: ULTRASOUND IMAGING | Age: 32
DRG: 566 | End: 2022-01-10
Attending: EMERGENCY MEDICINE
Payer: MEDICAID

## 2022-01-10 DIAGNOSIS — O00.01 ABDOMINAL PREGNANCY WITH INTRAUTERINE PREGNANCY: ICD-10-CM

## 2022-01-10 DIAGNOSIS — K80.20 SYMPTOMATIC CHOLELITHIASIS: ICD-10-CM

## 2022-01-10 DIAGNOSIS — K80.20 GALLSTONES: Primary | ICD-10-CM

## 2022-01-10 LAB
ALBUMIN SERPL-MCNC: 2.5 G/DL (ref 3.5–5)
ALBUMIN/GLOB SERPL: 0.8 (ref 1.1–2.2)
ALP SERPL-CCNC: 129 U/L (ref 45–117)
ALT SERPL-CCNC: 31 U/L (ref 12–78)
ANION GAP SERPL CALC-SCNC: 8 MMOL/L (ref 5–15)
APPEARANCE UR: CLEAR
AST SERPL W P-5'-P-CCNC: 84 U/L (ref 15–37)
BACTERIA URNS QL MICRO: NEGATIVE /HPF
BACTERIA URNS QL MICRO: NEGATIVE /HPF
BASOPHILS # BLD: 0 K/UL (ref 0–0.1)
BASOPHILS NFR BLD: 0 % (ref 0–1)
BILIRUB SERPL-MCNC: 2.1 MG/DL (ref 0.2–1)
BILIRUB UR QL: ABNORMAL
BUN SERPL-MCNC: 4 MG/DL (ref 6–20)
BUN/CREAT SERPL: 8 (ref 12–20)
CA-I BLD-MCNC: 8 MG/DL (ref 8.5–10.1)
CHLORIDE SERPL-SCNC: 106 MMOL/L (ref 97–108)
CO2 SERPL-SCNC: 22 MMOL/L (ref 21–32)
COLOR UR: ABNORMAL
CREAT SERPL-MCNC: 0.53 MG/DL (ref 0.55–1.02)
DIFFERENTIAL METHOD BLD: ABNORMAL
EOSINOPHIL # BLD: 0 K/UL (ref 0–0.4)
EOSINOPHIL NFR BLD: 0 % (ref 0–7)
ERYTHROCYTE [DISTWIDTH] IN BLOOD BY AUTOMATED COUNT: 13.1 % (ref 11.5–14.5)
GLOBULIN SER CALC-MCNC: 3.3 G/DL (ref 2–4)
GLUCOSE SERPL-MCNC: 137 MG/DL (ref 65–100)
GLUCOSE UR STRIP.AUTO-MCNC: NEGATIVE MG/DL
HCT VFR BLD AUTO: 32.6 % (ref 35–47)
HGB BLD-MCNC: 11.5 G/DL (ref 11.5–16)
HGB UR QL STRIP: NEGATIVE
IMM GRANULOCYTES # BLD AUTO: 0.1 K/UL (ref 0–0.04)
IMM GRANULOCYTES NFR BLD AUTO: 1 % (ref 0–0.5)
KETONES UR QL STRIP.AUTO: 80 MG/DL
LACTATE SERPL-SCNC: 1.2 MMOL/L (ref 0.4–2)
LEUKOCYTE ESTERASE UR QL STRIP.AUTO: NEGATIVE
LIPASE SERPL-CCNC: 48 U/L (ref 73–393)
LYMPHOCYTES # BLD: 0.7 K/UL (ref 0.8–3.5)
LYMPHOCYTES NFR BLD: 6 % (ref 12–49)
MCH RBC QN AUTO: 29.6 PG (ref 26–34)
MCHC RBC AUTO-ENTMCNC: 35.3 G/DL (ref 30–36.5)
MCV RBC AUTO: 84 FL (ref 80–99)
MONOCYTES # BLD: 0.9 K/UL (ref 0–1)
MONOCYTES NFR BLD: 7 % (ref 5–13)
MUCOUS THREADS URNS QL MICRO: ABNORMAL /LPF
MUCOUS THREADS URNS QL MICRO: ABNORMAL /LPF
NEUTS SEG # BLD: 11.5 K/UL (ref 1.8–8)
NEUTS SEG NFR BLD: 86 % (ref 32–75)
NITRITE UR QL STRIP.AUTO: NEGATIVE
NRBC # BLD: 0 K/UL (ref 0–0.01)
NRBC BLD-RTO: 0 PER 100 WBC
PH UR STRIP: 6 (ref 5–8)
PLATELET # BLD AUTO: 222 K/UL (ref 150–400)
PMV BLD AUTO: 9.7 FL (ref 8.9–12.9)
POTASSIUM SERPL-SCNC: 3.4 MMOL/L (ref 3.5–5.1)
PROT SERPL-MCNC: 5.8 G/DL (ref 6.4–8.2)
PROT UR STRIP-MCNC: 30 MG/DL
RBC # BLD AUTO: 3.88 M/UL (ref 3.8–5.2)
RBC #/AREA URNS HPF: ABNORMAL /HPF (ref 0–5)
RBC #/AREA URNS HPF: ABNORMAL /HPF (ref 0–5)
SODIUM SERPL-SCNC: 136 MMOL/L (ref 136–145)
SP GR UR REFRACTOMETRY: 1.03 (ref 1–1.03)
UROBILINOGEN UR QL STRIP.AUTO: 4 EU/DL (ref 0.1–1)
WBC # BLD AUTO: 13.3 K/UL (ref 3.6–11)
WBC URNS QL MICRO: ABNORMAL /HPF (ref 0–4)
WBC URNS QL MICRO: ABNORMAL /HPF (ref 0–4)

## 2022-01-10 PROCEDURE — 76815 OB US LIMITED FETUS(S): CPT

## 2022-01-10 PROCEDURE — 83605 ASSAY OF LACTIC ACID: CPT

## 2022-01-10 PROCEDURE — 74011000258 HC RX REV CODE- 258: Performed by: INTERNAL MEDICINE

## 2022-01-10 PROCEDURE — 99284 EMERGENCY DEPT VISIT MOD MDM: CPT

## 2022-01-10 PROCEDURE — 74011250636 HC RX REV CODE- 250/636: Performed by: EMERGENCY MEDICINE

## 2022-01-10 PROCEDURE — 76817 TRANSVAGINAL US OBSTETRIC: CPT

## 2022-01-10 PROCEDURE — 96374 THER/PROPH/DIAG INJ IV PUSH: CPT

## 2022-01-10 PROCEDURE — 99232 SBSQ HOSP IP/OBS MODERATE 35: CPT | Performed by: OBSTETRICS & GYNECOLOGY

## 2022-01-10 PROCEDURE — 36415 COLL VENOUS BLD VENIPUNCTURE: CPT

## 2022-01-10 PROCEDURE — 96361 HYDRATE IV INFUSION ADD-ON: CPT

## 2022-01-10 PROCEDURE — 65270000029 HC RM PRIVATE

## 2022-01-10 PROCEDURE — 81001 URINALYSIS AUTO W/SCOPE: CPT

## 2022-01-10 PROCEDURE — 85025 COMPLETE CBC W/AUTO DIFF WBC: CPT

## 2022-01-10 PROCEDURE — 83690 ASSAY OF LIPASE: CPT

## 2022-01-10 PROCEDURE — 80053 COMPREHEN METABOLIC PANEL: CPT

## 2022-01-10 PROCEDURE — 76705 ECHO EXAM OF ABDOMEN: CPT

## 2022-01-10 PROCEDURE — 74011250637 HC RX REV CODE- 250/637: Performed by: INTERNAL MEDICINE

## 2022-01-10 PROCEDURE — 74011000250 HC RX REV CODE- 250: Performed by: INTERNAL MEDICINE

## 2022-01-10 PROCEDURE — 96375 TX/PRO/DX INJ NEW DRUG ADDON: CPT

## 2022-01-10 PROCEDURE — 74011250636 HC RX REV CODE- 250/636: Performed by: INTERNAL MEDICINE

## 2022-01-10 RX ORDER — MORPHINE SULFATE 4 MG/ML
4 INJECTION INTRAVENOUS ONCE
Status: COMPLETED | OUTPATIENT
Start: 2022-01-10 | End: 2022-01-10

## 2022-01-10 RX ORDER — ACETAMINOPHEN 650 MG/1
650 SUPPOSITORY RECTAL
Status: DISCONTINUED | OUTPATIENT
Start: 2022-01-10 | End: 2022-01-13 | Stop reason: HOSPADM

## 2022-01-10 RX ORDER — ONDANSETRON 2 MG/ML
4 INJECTION INTRAMUSCULAR; INTRAVENOUS
Status: DISCONTINUED | OUTPATIENT
Start: 2022-01-10 | End: 2022-01-13 | Stop reason: HOSPADM

## 2022-01-10 RX ORDER — HYDROMORPHONE HYDROCHLORIDE 1 MG/ML
1 INJECTION, SOLUTION INTRAMUSCULAR; INTRAVENOUS; SUBCUTANEOUS
Status: DISCONTINUED | OUTPATIENT
Start: 2022-01-10 | End: 2022-01-13 | Stop reason: HOSPADM

## 2022-01-10 RX ORDER — MORPHINE SULFATE 2 MG/ML
2 INJECTION, SOLUTION INTRAMUSCULAR; INTRAVENOUS
Status: DISCONTINUED | OUTPATIENT
Start: 2022-01-10 | End: 2022-01-10

## 2022-01-10 RX ORDER — LABETALOL HCL 20 MG/4 ML
10 SYRINGE (ML) INTRAVENOUS
Status: DISCONTINUED | OUTPATIENT
Start: 2022-01-10 | End: 2022-01-13 | Stop reason: HOSPADM

## 2022-01-10 RX ORDER — SODIUM CHLORIDE 0.9 % (FLUSH) 0.9 %
5-40 SYRINGE (ML) INJECTION EVERY 8 HOURS
Status: DISCONTINUED | OUTPATIENT
Start: 2022-01-10 | End: 2022-01-12

## 2022-01-10 RX ORDER — ONDANSETRON 4 MG/1
4 TABLET, ORALLY DISINTEGRATING ORAL
Status: DISCONTINUED | OUTPATIENT
Start: 2022-01-10 | End: 2022-01-13 | Stop reason: HOSPADM

## 2022-01-10 RX ORDER — ONDANSETRON 2 MG/ML
4 INJECTION INTRAMUSCULAR; INTRAVENOUS
Status: COMPLETED | OUTPATIENT
Start: 2022-01-10 | End: 2022-01-10

## 2022-01-10 RX ORDER — ENOXAPARIN SODIUM 100 MG/ML
40 INJECTION SUBCUTANEOUS DAILY
Status: DISCONTINUED | OUTPATIENT
Start: 2022-01-10 | End: 2022-01-13 | Stop reason: HOSPADM

## 2022-01-10 RX ORDER — LABETALOL 200 MG/1
400 TABLET, FILM COATED ORAL 2 TIMES DAILY
Status: DISCONTINUED | OUTPATIENT
Start: 2022-01-10 | End: 2022-01-13 | Stop reason: HOSPADM

## 2022-01-10 RX ORDER — POLYETHYLENE GLYCOL 3350 17 G/17G
17 POWDER, FOR SOLUTION ORAL DAILY PRN
Status: DISCONTINUED | OUTPATIENT
Start: 2022-01-10 | End: 2022-01-13 | Stop reason: HOSPADM

## 2022-01-10 RX ORDER — POTASSIUM CHLORIDE 7.45 MG/ML
10 INJECTION INTRAVENOUS
Status: COMPLETED | OUTPATIENT
Start: 2022-01-10 | End: 2022-01-10

## 2022-01-10 RX ORDER — SODIUM CHLORIDE, SODIUM LACTATE, POTASSIUM CHLORIDE, CALCIUM CHLORIDE 600; 310; 30; 20 MG/100ML; MG/100ML; MG/100ML; MG/100ML
125 INJECTION, SOLUTION INTRAVENOUS CONTINUOUS
Status: DISCONTINUED | OUTPATIENT
Start: 2022-01-10 | End: 2022-01-13 | Stop reason: HOSPADM

## 2022-01-10 RX ORDER — NIFEDIPINE 30 MG/1
30 TABLET, EXTENDED RELEASE ORAL DAILY
Status: DISCONTINUED | OUTPATIENT
Start: 2022-01-10 | End: 2022-01-13 | Stop reason: HOSPADM

## 2022-01-10 RX ORDER — ACETAMINOPHEN 325 MG/1
650 TABLET ORAL
Status: DISCONTINUED | OUTPATIENT
Start: 2022-01-10 | End: 2022-01-13 | Stop reason: HOSPADM

## 2022-01-10 RX ORDER — SODIUM CHLORIDE 0.9 % (FLUSH) 0.9 %
5-40 SYRINGE (ML) INJECTION AS NEEDED
Status: DISCONTINUED | OUTPATIENT
Start: 2022-01-10 | End: 2022-01-13 | Stop reason: HOSPADM

## 2022-01-10 RX ADMIN — LABETALOL HYDROCHLORIDE 400 MG: 200 TABLET, FILM COATED ORAL at 11:43

## 2022-01-10 RX ADMIN — POTASSIUM CHLORIDE 10 MEQ: 7.46 INJECTION, SOLUTION INTRAVENOUS at 15:56

## 2022-01-10 RX ADMIN — ONDANSETRON 4 MG: 2 INJECTION INTRAMUSCULAR; INTRAVENOUS at 06:23

## 2022-01-10 RX ADMIN — MORPHINE SULFATE 4 MG: 4 INJECTION INTRAVENOUS at 06:23

## 2022-01-10 RX ADMIN — LABETALOL HYDROCHLORIDE 400 MG: 200 TABLET, FILM COATED ORAL at 20:47

## 2022-01-10 RX ADMIN — POTASSIUM CHLORIDE 10 MEQ: 7.46 INJECTION, SOLUTION INTRAVENOUS at 14:40

## 2022-01-10 RX ADMIN — SODIUM CHLORIDE 1000 ML: 9 INJECTION, SOLUTION INTRAVENOUS at 06:23

## 2022-01-10 RX ADMIN — POTASSIUM CHLORIDE 10 MEQ: 7.46 INJECTION, SOLUTION INTRAVENOUS at 14:49

## 2022-01-10 RX ADMIN — MORPHINE SULFATE 4 MG: 4 INJECTION INTRAVENOUS at 09:47

## 2022-01-10 RX ADMIN — HYDROMORPHONE HYDROCHLORIDE 1 MG: 1 INJECTION, SOLUTION INTRAMUSCULAR; INTRAVENOUS; SUBCUTANEOUS at 15:30

## 2022-01-10 RX ADMIN — POTASSIUM CHLORIDE 10 MEQ: 7.46 INJECTION, SOLUTION INTRAVENOUS at 13:23

## 2022-01-10 RX ADMIN — NIFEDIPINE 30 MG: 30 TABLET, FILM COATED, EXTENDED RELEASE ORAL at 11:43

## 2022-01-10 RX ADMIN — POTASSIUM CHLORIDE 10 MEQ: 7.46 INJECTION, SOLUTION INTRAVENOUS at 12:26

## 2022-01-10 RX ADMIN — PIPERACILLIN SODIUM AND TAZOBACTAM SODIUM 3.38 G: 3; .375 INJECTION, POWDER, LYOPHILIZED, FOR SOLUTION INTRAVENOUS at 12:40

## 2022-01-10 RX ADMIN — SODIUM CHLORIDE, POTASSIUM CHLORIDE, SODIUM LACTATE AND CALCIUM CHLORIDE 125 ML/HR: 600; 310; 30; 20 INJECTION, SOLUTION INTRAVENOUS at 11:07

## 2022-01-10 RX ADMIN — SODIUM CHLORIDE, POTASSIUM CHLORIDE, SODIUM LACTATE AND CALCIUM CHLORIDE 125 ML/HR: 600; 310; 30; 20 INJECTION, SOLUTION INTRAVENOUS at 20:46

## 2022-01-10 RX ADMIN — SODIUM CHLORIDE, PRESERVATIVE FREE 10 ML: 5 INJECTION INTRAVENOUS at 21:15

## 2022-01-10 RX ADMIN — HYDROMORPHONE HYDROCHLORIDE 1 MG: 1 INJECTION, SOLUTION INTRAMUSCULAR; INTRAVENOUS; SUBCUTANEOUS at 11:47

## 2022-01-10 RX ADMIN — PIPERACILLIN SODIUM AND TAZOBACTAM SODIUM 3.38 G: 3; .375 INJECTION, POWDER, LYOPHILIZED, FOR SOLUTION INTRAVENOUS at 19:37

## 2022-01-10 RX ADMIN — HYDROMORPHONE HYDROCHLORIDE 1 MG: 1 INJECTION, SOLUTION INTRAMUSCULAR; INTRAVENOUS; SUBCUTANEOUS at 19:44

## 2022-01-10 RX ADMIN — POTASSIUM CHLORIDE 10 MEQ: 7.46 INJECTION, SOLUTION INTRAVENOUS at 11:07

## 2022-01-10 NOTE — CONSULTS
Gastroenterology Consult     Referring Physician: Mario Alberto Veras NP     Consult Date: 1/10/2022     Subjective:     Chief Complaint: Abdominal Pain    History of Present Illness: Janeen Pond is a 27 y.o. female who is seen in consultation for Choledocholithiasis. Patient seen in the ED she is 13 weeks gestation. She presented to the Ed with severe right upper quadrant pain radiating to her back. She does report nausea and vomiting. She states she has been unable to keep food or medications down. Her pain started yesterday. She denies recent fevers, or chills. Abdominal Ultrasound shows gallstones and CBD dilatation 0.8 cm, intrahepatic ductal dilatation suspected. She does have elevated LFT's: Total bilirubin 2.1, ALT 31, AST 84, Alk Phosphatase 129, lipase 48. She has a past medical history significant for kidney stones and gestational hypertension. Her abdomen is soft with tenderness to light palpation. Possible ERCP, high risk to fetus d/t radiation exposure in first trimester. If symptoms worsen contact surgery to consider cholecystectomy or bile duct drain placement until after first trimester. Repeat LFT's in the am. Keep NPO, continue IV hydration. Abdominal Ultrasound 1/10/2022:  IMPRESSION  Gallstone. No pericholecystic inflammatory changes noted. Common  bile duct dilatation. Intrahepatic ductal dilatation suspected.     Past Medical History:   Diagnosis Date    Abnormal Papanicolaou smear of cervix     Anemia     Bipolar disorder ([facility] [address]     Gestational hypertension     Hypertension in pregnancy     pre-E with prior pregnancy    Ill-defined condition 2019    right shoulder injury/pain    Kidney disease     kidney stones    Postpartum depression     PTSD (post-traumatic stress disorder)     Trauma      Past Surgical History:   Procedure Laterality Date    HX APPENDECTOMY      HX  SECTION      HX LEEP PROCEDURE  2019    HX OTHER SURGICAL      HX UROLOGICAL kidney stones      Family History   Problem Relation Age of Onset    Stroke Mother     Hypertension Mother     Hypertension Father     Mult Sclerosis Father      Social History     Tobacco Use    Smoking status: Never Smoker    Smokeless tobacco: Never Used   Substance Use Topics    Alcohol use: Not Currently      Allergies   Allergen Reactions    Latex Itching and Swelling    Iodine Itching and Swelling     Current Facility-Administered Medications   Medication Dose Route Frequency    lactated Ringers infusion  125 mL/hr IntraVENous CONTINUOUS    potassium chloride 10 mEq in 100 ml IVPB  10 mEq IntraVENous Q1H    labetaloL (NORMODYNE) tablet 400 mg  400 mg Oral BID    NIFEdipine ER (PROCARDIA XL) tablet 30 mg  30 mg Oral DAILY    . PHARMACY TO SUBSTITUTE PER PROTOCOL (Reordered from: prenatal 65/XLZV fum/folic/dha (PRENATAL-1 PO))    Per Protocol    sodium chloride (NS) flush 5-40 mL  5-40 mL IntraVENous Q8H    sodium chloride (NS) flush 5-40 mL  5-40 mL IntraVENous PRN    acetaminophen (TYLENOL) tablet 650 mg  650 mg Oral Q6H PRN    Or    acetaminophen (TYLENOL) suppository 650 mg  650 mg Rectal Q6H PRN    polyethylene glycol (MIRALAX) packet 17 g  17 g Oral DAILY PRN    ondansetron (ZOFRAN ODT) tablet 4 mg  4 mg Oral Q8H PRN    Or    ondansetron (ZOFRAN) injection 4 mg  4 mg IntraVENous Q6H PRN    enoxaparin (LOVENOX) injection 40 mg  40 mg SubCUTAneous DAILY    labetaloL (NORMODYNE;TRANDATE) 20 mg/4 mL (5 mg/mL) injection 10 mg  10 mg IntraVENous Q4H PRN    piperacillin-tazobactam (ZOSYN) 3.375 g in 0.9% sodium chloride (MBP/ADV) 100 mL MBP  3.375 g IntraVENous Q8H    HYDROmorphone (DILAUDID) injection 1 mg  1 mg IntraVENous Q4H PRN     Current Outpatient Medications   Medication Sig    promethazine (PHENERGAN) 25 mg tablet Take 1 Tablet by mouth every six (6) hours as needed for Nausea for up to 3 days.  labetaloL (NORMODYNE) 200 mg tablet Take 2 Tabs by mouth two (2) times a day. Indications: high blood pressure    ibuprofen (MOTRIN) 600 mg tablet Take 1 Tab by mouth every six (6) hours as needed for Pain.  NIFEdipine ER (PROCARDIA XL) 30 mg ER tablet Take 1 Tab by mouth daily. Indications: high blood pressure    prenatal 01/JNGD fum/folic/dha (PRENATAL-1 PO) Take  by mouth. Review of Systems:  A detailed 10 organ review of systems is obtained with pertinent positives as listed in the History of Present Illness and Past Medical History. All others are negative. Objective:     Physical Exam:  Visit Vitals  BP (!) 136/90   Pulse (!) 111   Temp 98.2 °F (36.8 °C)   Resp 18   Ht 5' 2\" (1.575 m)   Wt 68 kg (150 lb)   SpO2 96%   BMI 27.44 kg/m²        Skin:  Extremities and face reveal no rashes. No spann erythema. No telangiectasias on the chest wall. HEENT: Sclerae anicteric. Extra-occular muscles are intact. No oral ulcers. No abnormal pigmentation of the lips. The neck is supple. Cardiovascular: Regular rate and rhythm. Respiratory:  Comfortable breathing with no accessory muscle use. GI:  Abdomen nondistended, soft, and  +tender. Normal active bowel sounds. No enlargement of the liver or spleen. No masses palpable. Rectal:  Deferred  Musculoskeletal: Extremities have good range of motion. Neurological:  Gross memory appears intact. Patient is alert and oriented. Psychiatric:  Mood appears appropriate with judgement intact. Lymphatic:  No cervical or supraclavicular adenopathy.     Lab/Data Review:  CMP:   Lab Results   Component Value Date/Time     01/10/2022 06:30 AM    K 3.4 (L) 01/10/2022 06:30 AM     01/10/2022 06:30 AM    CO2 22 01/10/2022 06:30 AM    AGAP 8 01/10/2022 06:30 AM     (H) 01/10/2022 06:30 AM    BUN 4 (L) 01/10/2022 06:30 AM    CREA 0.53 (L) 01/10/2022 06:30 AM    GFRAA >60 01/10/2022 06:30 AM    GFRNA >60 01/10/2022 06:30 AM    CA 8.0 (L) 01/10/2022 06:30 AM    ALB 2.5 (L) 01/10/2022 06:30 AM    TP 5.8 (L) 01/10/2022 06:30 AM GLOB 3.3 01/10/2022 06:30 AM    AGRAT 0.8 (L) 01/10/2022 06:30 AM    ALT 31 01/10/2022 06:30 AM     CBC:   Lab Results   Component Value Date/Time    WBC 13.3 (H) 01/10/2022 06:30 AM    HGB 11.5 01/10/2022 06:30 AM    HCT 32.6 (L) 01/10/2022 06:30 AM     01/10/2022 06:30 AM         Assessment/Plan:     1. Choledocholithiasis      LFT's in the am      NPO       Continue IV hydration      Continue IV Zosyn      Possible ERCP high risk to fetus d/t radiation exposure in first trimester      If symptoms worsen contact surgery to consider ERCP or CHOLECYSTOSTOMY until after first trimester. 2. 13 Weeks Gestation      OB/GYN consulted  3. Gallstones      Surgery consulted    Thank you for allowing me to participate in this patients care. Plan discussed with Dr. Olya Medrano and he approves.

## 2022-01-10 NOTE — ED PROVIDER NOTES
EMERGENCY DEPARTMENT HISTORY AND PHYSICAL EXAM      Date: 1/10/2022  Patient Name: Billie Wallace      History of Presenting Illness     Chief Complaint   Patient presents with    Abdominal Pain       History Provided By: Patient    HPI: Billie Wallace, 27 y.o. female with a past medical history significant hypertension and Kidney stones, who is currently 13 weeks pregnant  presents to the ED with cc of severe right upper quadrant pain. Was seen yesterday for the same. Diagnosis symptomatic cholelithiasis. However overnight patient had vomiting and worsening of her abdominal pain. States she was unable to keep any medications down. No Fever. There are no other complaints, changes, or physical findings at this time.     PCP: Oralia Leon NP    Current Facility-Administered Medications   Medication Dose Route Frequency Provider Last Rate Last Admin    lactated Ringers infusion  125 mL/hr IntraVENous CONTINUOUS Baylee Leonard Floyd  mL/hr at 01/11/22 0143 125 mL/hr at 01/11/22 0143    labetaloL (NORMODYNE) tablet 400 mg  400 mg Oral BID Valdez Cordoba MD   400 mg at 01/10/22 2047    NIFEdipine ER (PROCARDIA XL) tablet 30 mg  30 mg Oral DAILY Leonard Adrian MD   30 mg at 01/10/22 1143    pnv 6-iron-FA-B12-calcium-D3 35 mg(d)/1.1 mg -600 unit (n) TbSQ 1 Tablet  1 Tablet Oral DAILY Valdez Cordoba MD        sodium chloride (NS) flush 5-40 mL  5-40 mL IntraVENous Amrita Contreras MD   10 mL at 01/10/22 2115    sodium chloride (NS) flush 5-40 mL  5-40 mL IntraVENous PRN Valdez Cordoba MD        acetaminophen (TYLENOL) tablet 650 mg  650 mg Oral Q6H PRN Valdez Cordoba MD        Or    acetaminophen (TYLENOL) suppository 650 mg  650 mg Rectal Q6H PRN Valdez Cordoba MD        polyethylene glycol [facility]) packet 17 g  17 g Oral DAILY PRN Valdez Cordoba MD        ondansetron (ZOFRAN ODT) tablet 4 mg  4 mg Oral Q8H PRN Amrita Adrian MD        Or    ondansetron [facility]) injection 4 mg  4 mg IntraVENous Q6H PRN Amrita Adrian MD        enoxaparin (LOVENOX) injection 40 mg  40 mg SubCUTAneous DAILY Amrita Adrian MD        labetaloL (NORMODYNE;TRANDATE) 20 mg/4 mL (5 mg/mL) injection 10 mg  10 mg IntraVENous Q4H PRN Amrita Adrian MD        piperacillin-tazobactam (ZOSYN) 3.375 g in 0.9% sodium chloride (MBP/ADV) 100 mL MBP  3.375 g IntraVENous Amrita Pickering MD 25 mL/hr at 01/10/22 193 3.375 g at 01/10/22 193    HYDROmorphone (DILAUDID) injection 1 mg  1 mg IntraVENous Q4H PRN Letty Smith MD   1 mg at 22 0146       Past History     Past Medical History:  Past Medical History:   Diagnosis Date    Abnormal Papanicolaou smear of cervix     Anemia     [facility] disorder ([facility] [address]     Gestational hypertension     Hypertension in pregnancy     pre-E with prior pregnancy    Ill-defined condition 2019    right shoulder injury/pain    Kidney disease     kidney stones    Postpartum depression     PTSD (post-traumatic stress disorder)     Trauma        Past Surgical History:  Past Surgical History:   Procedure Laterality Date    HX APPENDECTOMY      HX  SECTION      HX LEEP PROCEDURE  2019    HX OTHER SURGICAL      HX UROLOGICAL      kidney stones       Family History:  Family History   Problem Relation Age of Onset    Stroke Mother     Hypertension Mother     Hypertension Father     Mult Sclerosis Father        Social History:  Social History     Tobacco Use    Smoking status: Never Smoker    Smokeless tobacco: Never Used   Substance Use Topics    Alcohol use: Not Currently    Drug use: Never       Allergies:   Allergies   Allergen Reactions    Latex Itching and Swelling    Iodine Itching and Swelling    Reglan [Metoclopramide] Other (comments)         Review of Systems Review of Systems   Constitutional: Negative for activity change and fever. HENT: Negative for rhinorrhea and sore throat. Eyes: Negative for visual disturbance. Respiratory: Negative for cough. Cardiovascular: Negative for chest pain. Gastrointestinal: Positive for abdominal pain, nausea and vomiting. Negative for diarrhea. Genitourinary: Negative for dysuria. Musculoskeletal: Negative for arthralgias and myalgias. Skin: Negative for rash and wound. Neurological: Negative for syncope and headaches. Psychiatric/Behavioral: Negative for confusion. All other systems reviewed and are negative. Physical Exam     Physical Exam  Vitals and nursing note reviewed. Constitutional:       General: She is in acute distress. Appearance: Normal appearance. She is normal weight. She is not toxic-appearing. HENT:      Head: Normocephalic and atraumatic. Nose: Nose normal.      Mouth/Throat:      Mouth: Mucous membranes are moist.   Eyes:      Conjunctiva/sclera: Conjunctivae normal.   Cardiovascular:      Rate and Rhythm: Normal rate. Pulses: Normal pulses. Pulmonary:      Effort: Pulmonary effort is normal. No respiratory distress. Abdominal:      Tenderness: There is abdominal tenderness in the right upper quadrant. There is guarding. There is no rebound. Positive signs include Hermosillo's sign. Musculoskeletal:         General: No swelling or deformity. Normal range of motion. Skin:     General: Skin is warm and dry. Findings: No rash. Neurological:      General: No focal deficit present. Mental Status: She is alert.    Psychiatric:         Mood and Affect: Mood normal.         Behavior: Behavior normal.         Lab and Diagnostic Study Results     Labs -     Recent Results (from the past 12 hour(s))   URINALYSIS W/ RFLX MICROSCOPIC    Collection Time: 01/10/22  3:36 PM   Result Value Ref Range    Color Damari      Appearance Clear Clear      Specific gravity 1.029 1.003 - 1.030      pH (UA) 6.0 5.0 - 8.0      Protein 30 (A) Negative mg/dL    Glucose Negative Negative mg/dL    Ketone 80 (A) Negative mg/dL    Bilirubin Small (A) Negative      Blood Negative Negative      Urobilinogen 4.0 (H) 0.1 - 1.0 EU/dL    Nitrites Negative Negative      Leukocyte Esterase Negative Negative      WBC 5-10 0 - 4 /hpf    RBC 0-5 0 - 5 /hpf    Bacteria Negative Negative /hpf    Mucus 1+ /lpf   URINE MICROSCOPIC    Collection Time: 01/10/22  3:36 PM   Result Value Ref Range    WBC 5-10 0 - 4 /hpf    RBC 0-5 0 - 5 /hpf    Bacteria Negative Negative /hpf    Mucus 1+ (A) Negative /lpf       Radiologic Studies -   [unfilled]  CT Results  (Last 48 hours)    None        CXR Results  (Last 48 hours)    None          Medical Decision Making and ED Course   - I am the first and primary provider for this patient AND AM THE PRIMARY PROVIDER OF RECORD. - I reviewed the vital signs, available nursing notes, past medical history, past surgical history, family history and social history. - Initial assessment performed. The patients presenting problems have been discussed, and the staff are in agreement with the care plan formulated and outlined with them. I have encouraged them to ask questions as they arise throughout their visit. Vital Signs-Reviewed the patient's vital signs.     Patient Vitals for the past 12 hrs:   Temp Pulse Resp BP SpO2   01/11/22 0124 98.2 °F (36.8 °C) (!) 108 16 108/64 98 %   01/11/22 0050  (!) 102 13 107/87 97 %   01/11/22 0010  (!) 115 18 105/69 98 %   01/10/22 2320  (!) 115 16 117/75 97 %   01/10/22 2215  (!) 109 13 117/69 96 %   01/10/22 2117  (!) 120 16 109/73 95 %   01/10/22 2047  (!) 124  113/75    01/10/22 2034  (!) 124 19 113/75 96 %   01/10/22 1947 98.9 °F (37.2 °C)       01/10/22 1932  (!) 110 18 118/82 97 %   01/10/22 1907  (!) 106 17 117/85 96 %   01/10/22 1800  (!) 112 18 118/82 97 %   01/10/22 1700  (!) 108 14 100/64 96 %   01/10/22 1600  (!) 108 14 106/74 96 %       Records Reviewed: Nursing Notes and Old Medical Records        ED Course:       ED Course as of 01/11/22 0300   Mon David 10, 2022   [age]-year-old female presents for evaluation of abdominal pain. Patient reports having right upper quadrant abdominal pain which she was seen for yesterday. Throughout the day it got significantly worse and she had vomiting. She is not able to keep anything down at home. Of note patient is 13 weeks pregnant. On exam patient is in acute distress with a Hermosillo sign. Concerning for evolution to cholecystitis. Differential diagnosis includes cholecystitis versus symptomatic cholelithiasis versus UTI versus pancreatitis. Getting labs including a CBC, CMP, lipase, lactate, UA and right upper quadrant ultrasound. Giving fluids, Zofran and morphine. Disposition as per clinical course. [LW]   1215 Awaiting work-up. Patient signed out to daytime physician. [LW]   1029 Dr Emily Barros consulted. [HP]   7992 Discussed case with surgery requesting medical admission. Patient is not a surgical candidate with her pregnancy probably needs an ERCP also not a candidate secondary to 13 weeks pregnant. D/w Viviane Jackson will admit    OBGYN consult kendall rucker [HP]      ED Course User Index  [HP] Talita Ryan MD  [LW] Harry Copeland MD         Consultations:       Consultations: - General Surgery Consultant: Dr. Emily Barros: We have asked for emergent assistance with regard to this patient. We have discussed the patients HPI, ROS, PE and results this far. They will come and evaluate the patient for their acute surgical needs and further treatment with possible admission. and - OB/GYN Consultant: Dr. Consuelo Doran: We have asked for emergent assistance with regard to this patient. We have discussed the patients HPI, ROS, PE and labd and imaging results this far.         Disposition     Disposition: Admitted to Floor Surgical Floor the case was discussed with the admitting physician [place]. Admitted  Diagnosis     Clinical Impression:   1. Gallstones    2. Abdominal pregnancy with intrauterine pregnancy        Attestations:    Joe Moon MD    Please note that this dictation was completed with [employer], the computer voice recognition software. Quite often unanticipated grammatical, syntax, homophones, and other interpretive errors are inadvertently transcribed by the computer software. Please disregard these errors. Please excuse any errors that have escaped final proofreading. Thank you.

## 2022-01-10 NOTE — H&P
GENERAL GENERIC H&P/CONSULT  Presenting complaint: Abdominal pain    Subjective: 65-year-old female with past medical history of multiple comorbidities presents [facility] with complaints of abdominal pain. Patient states she was in her usual state of health until 2 days back patient started experiencing sudden onset persistent dull upper abdominal pain, radiating to her back, severe in intensity with no aggravating or alleviating factors following which patient presented to the ED. patient denies any fevers chills nausea vomiting lightheadedness dizziness dyspnea orthopnea paroxysmal nocturnal dyspnea chest pain palpitations headache focal weakness loss of sensation auditory or visual symptoms stool or urinary complaints or any other associated symptoms. Patient endorses no recent sick contacts or travel activity    Past Medical History:   Diagnosis Date    Abnormal Papanicolaou smear of cervix     Anemia     Bipolar disorder ([facility] [address]     Gestational hypertension     Hypertension in pregnancy     pre-E with prior pregnancy    Ill-defined condition 2019    right shoulder injury/pain    Kidney disease     kidney stones    Postpartum depression     PTSD (post-traumatic stress disorder)     Trauma       Past Surgical History:   Procedure Laterality Date    HX APPENDECTOMY      HX  SECTION      HX LEEP PROCEDURE  2019    HX OTHER SURGICAL      HX UROLOGICAL      kidney stones      Prior to Admission medications    Medication Sig Start Date End Date Taking? Authorizing Provider   promethazine (PHENERGAN) 25 mg tablet Take 1 Tablet by mouth every six (6) hours as needed for Nausea for up to 3 days. 22  Cosme WARNER DO   labetaloL (NORMODYNE) 200 mg tablet Take 2 Tabs by mouth two (2) times a day. Indications: high blood pressure 20   Mary Sams MD   ibuprofen (MOTRIN) 600 mg tablet Take 1 Tab by mouth every six (6) hours as needed for Pain. 6/16/20   Sissy Gilford, MD   NIFEdipine ER (PROCARDIA XL) 30 mg ER tablet Take 1 Tab by mouth daily. Indications: high blood pressure 6/16/20   Sissy Gilford, MD   prenatal 66/VGYN fum/folic/dha (PRENATAL-1 PO) Take  by mouth. Provider, Historical     Allergies   Allergen Reactions    Latex Itching and Swelling    Iodine Itching and Swelling      Social History     Tobacco Use    Smoking status: Never Smoker    Smokeless tobacco: Never Used   Substance Use Topics    Alcohol use: Not Currently      Family History   Problem Relation Age of Onset    Stroke Mother     Hypertension Mother     Hypertension Father     Mult Sclerosis Father       Review of Systems   Constitutional: Positive for activity change, appetite change and fatigue. Negative for chills, diaphoresis, fever and unexpected weight change. HENT: Negative for congestion, dental problem, drooling, ear discharge, ear pain, facial swelling, hearing loss, mouth sores, nosebleeds, postnasal drip, rhinorrhea, sinus pressure, sinus pain, sneezing, sore throat, tinnitus, trouble swallowing and voice change. Eyes: Negative for photophobia, pain, discharge, redness, itching and visual disturbance. Respiratory: Negative for apnea, cough, choking, chest tightness, shortness of breath, wheezing and stridor. Cardiovascular: Negative for chest pain, palpitations and leg swelling. Gastrointestinal: Positive for abdominal pain. Negative for abdominal distention, anal bleeding, blood in stool, constipation, diarrhea, nausea, rectal pain and vomiting. Endocrine: Negative for cold intolerance, heat intolerance, polydipsia, polyphagia and polyuria. Genitourinary: Negative for decreased urine volume, difficulty urinating, dyspareunia, dysuria, enuresis, flank pain, frequency, genital sores, hematuria, menstrual problem, pelvic pain, urgency, vaginal bleeding, vaginal discharge and vaginal pain.    Musculoskeletal: Negative for arthralgias, back pain, gait problem, joint swelling, myalgias, neck pain and neck stiffness. Skin: Negative for color change, pallor, rash and wound. Allergic/Immunologic: Negative for environmental allergies, food allergies and immunocompromised state. Neurological: Positive for weakness. Negative for dizziness, tremors, seizures, syncope, facial asymmetry, speech difficulty, light-headedness, numbness and headaches. Hematological: Negative for adenopathy. Does not bruise/bleed easily. Psychiatric/Behavioral: Negative for agitation, behavioral problems, confusion, decreased concentration, dysphoric mood, hallucinations, self-injury, sleep disturbance and suicidal ideas. The patient is not nervous/anxious and is not hyperactive. Objective:    No intake/output data recorded. No intake/output data recorded. Patient Vitals for the past 8 hrs:   BP Temp Pulse Resp SpO2 Height Weight   01/10/22 1049 (!) 141/96  (!) 55 20 98 %     01/10/22 0540 120/73 98.2 °F (36.8 °C) (!) 103 22 95 % 5' 2\" (1.575 m) 68 kg (150 lb)     Physical Exam  Vitals reviewed. Constitutional:       General: She is not in acute distress. Appearance: She is normal weight. She is ill-appearing. She is not toxic-appearing or diaphoretic. HENT:      Head: Normocephalic and atraumatic. Nose: Nose normal. No congestion or rhinorrhea. Mouth/Throat:      Mouth: Mucous membranes are moist.      Pharynx: Oropharynx is clear. No oropharyngeal exudate or posterior oropharyngeal erythema. Eyes:      General: No scleral icterus. Right eye: No discharge. Left eye: No discharge. Extraocular Movements: Extraocular movements intact. Conjunctiva/sclera: Conjunctivae normal.      Pupils: Pupils are equal, round, and reactive to light. Neck:      Vascular: No carotid bruit. Cardiovascular:      Rate and Rhythm: Normal rate and regular rhythm. Pulses: Normal pulses. Heart sounds: Normal heart sounds.  No murmur heard. No friction rub. No gallop. Pulmonary:      Effort: Pulmonary effort is normal. No respiratory distress. Breath sounds: Normal breath sounds. No stridor. No wheezing, rhonchi or rales. Chest:      Chest wall: No tenderness. Abdominal:      General: Abdomen is flat. Bowel sounds are normal. There is distension. Palpations: Abdomen is soft. There is no mass. Tenderness: There is abdominal tenderness (periumbilical/RUQ region). There is no right CVA tenderness, left CVA tenderness, guarding or rebound. Hernia: No hernia is present. Musculoskeletal:         General: No swelling, tenderness, deformity or signs of injury. Normal range of motion. Cervical back: Normal range of motion and neck supple. No rigidity or tenderness. Right lower leg: No edema. Left lower leg: No edema. Lymphadenopathy:      Cervical: No cervical adenopathy. Skin:     General: Skin is warm. Capillary Refill: Capillary refill takes less than 2 seconds. Coloration: Skin is not jaundiced or pale. Findings: No bruising, erythema, lesion or rash. Neurological:      General: No focal deficit present. Mental Status: She is alert and oriented to person, place, and time. Mental status is at baseline. Cranial Nerves: No cranial nerve deficit. Sensory: No sensory deficit. Motor: No weakness. Coordination: Coordination normal.      Gait: Gait normal.      Deep Tendon Reflexes: Reflexes normal.   Psychiatric:         Mood and Affect: Mood normal.         Behavior: Behavior normal.         Thought Content:  Thought content normal.         Judgment: Judgment normal.          Labs:    Recent Results (from the past 24 hour(s))   CBC WITH AUTOMATED DIFF    Collection Time: 01/10/22  6:30 AM   Result Value Ref Range    WBC 13.3 (H) 3.6 - 11.0 K/uL    RBC 3.88 3.80 - 5.20 M/uL    HGB 11.5 11.5 - 16.0 g/dL    HCT 32.6 (L) 35.0 - 47.0 %    MCV 84.0 80.0 - 99.0 FL MCH 29.6 26.0 - 34.0 PG    MCHC 35.3 30.0 - 36.5 g/dL    RDW 13.1 11.5 - 14.5 %    PLATELET 270 643 - 486 K/uL    MPV 9.7 8.9 - 12.9 FL    NRBC 0.0 0.0  WBC    ABSOLUTE NRBC 0.00 0.00 - 0.01 K/uL    NEUTROPHILS 86 (H) 32 - 75 %    LYMPHOCYTES 6 (L) 12 - 49 %    MONOCYTES 7 5 - 13 %    EOSINOPHILS 0 0 - 7 %    BASOPHILS 0 0 - 1 %    IMMATURE GRANULOCYTES 1 (H) 0 - 0.5 %    ABS. NEUTROPHILS 11.5 (H) 1.8 - 8.0 K/UL    ABS. LYMPHOCYTES 0.7 (L) 0.8 - 3.5 K/UL    ABS. MONOCYTES 0.9 0.0 - 1.0 K/UL    ABS. EOSINOPHILS 0.0 0.0 - 0.4 K/UL    ABS. BASOPHILS 0.0 0.0 - 0.1 K/UL    ABS. IMM. GRANS. 0.1 (H) 0.00 - 0.04 K/UL    DF AUTOMATED     METABOLIC PANEL, COMPREHENSIVE    Collection Time: 01/10/22  6:30 AM   Result Value Ref Range    Sodium 136 136 - 145 mmol/L    Potassium 3.4 (L) 3.5 - 5.1 mmol/L    Chloride 106 97 - 108 mmol/L    CO2 22 21 - 32 mmol/L    Anion gap 8 5 - 15 mmol/L    Glucose 137 (H) 65 - 100 mg/dL    BUN 4 (L) 6 - 20 mg/dL    Creatinine 0.53 (L) 0.55 - 1.02 mg/dL    BUN/Creatinine ratio 8 (L) 12 - 20      GFR est AA >60 >60 ml/min/1.73m2    GFR est non-AA >60 >60 ml/min/1.73m2    Calcium 8.0 (L) 8.5 - 10.1 mg/dL    Bilirubin, total 2.1 (H) 0.2 - 1.0 mg/dL    AST (SGOT) 84 (H) 15 - 37 U/L    ALT (SGPT) 31 12 - 78 U/L    Alk. phosphatase 129 (H) 45 - 117 U/L    Protein, total 5.8 (L) 6.4 - 8.2 g/dL    Albumin 2.5 (L) 3.5 - 5.0 g/dL    Globulin 3.3 2.0 - 4.0 g/dL    A-G Ratio 0.8 (L) 1.1 - 2.2     LIPASE    Collection Time: 01/10/22  6:30 AM   Result Value Ref Range    Lipase 48 (L) 73 - 393 U/L   LACTIC ACID    Collection Time: 01/10/22  6:30 AM   Result Value Ref Range    Lactic acid 1.2 0.4 - 2.0 mmol/L       ECG: nonspecific ST and T waves changes   US RUQ:  IMPRESSION  Gallstone. No pericholecystic inflammatory changes noted. Common  bile duct dilatation. Intrahepatic ductal dilatation suspected.     Assessment:  Active Problems:    Symptomatic cholelithiasis (1/10/2022)        Plan:    Symptomatic cholelithiasis/choledocholithiasispatient presents with above-mentioned symptomatology and based on patient clinical presentation as well as physical examination in conjunction with radiographic findings consistent with symptomatic cholelithiasis with concerns for choledocholithiasis given evidence of biliary duct dilatation as well as elevated LFTs  Maintenance IV fluids  Dilaudid as needed for pain relief  N.p.o.  Continue to trend LFTs  Gastroenterology consult further evaluation  Zosyn for antibiotic coverage  General surgery consult appreciated, continue to follow recommendations    Hypertensioncontinue home antihypertensive medications    Pregnant at 13 weeks of gestation continue prenatal vitamins  Obtain OB/GYN consult for further evaluation    Hypokalemiareplete potassium and recheck potassium    FENNPO, lactated Ringer's at 125 cc/h, replete potassium and magnesium  Full code, will clarify about surrogate decision maker, admitted to surgical unit for further management    Signed:   Benjamin Piña MD 1/10/2022

## 2022-01-10 NOTE — CONSULTS
High Risk Consult Note    Chief Complaint:  Pregnancy and abdominal pain  . History of Present Illness: 27 y.o.  female at 15  weeks gestation. Seen for consult secondary to pregnancy in the presence of Choledocholithiasis,. Presented to [facility] c/o sever RUQ pain that has been present x 2 day,. Pain radiated to her back. + nausea, + vomiting. Reports decrease appetitie  Ultrasound of abdomen reveals  The liver measures 15.7 cm in its craniocaudal extent. The liver  demonstrates normal echotexture. No focal lesion is identified. The main  portal vein is patent.     The gallbladder contains a gallstone. No gallbladder wall thickening or  pericholecystic fluid is present. No tenderness was reported over the  gallbladder during this study. The common bile duct measures 0.8 cm which is  dilated. No intraductal stone is definitively identified. Intrahepatic biliary  ductal dilatation is suspected.     The right kidney measures 11.7 cm and demonstrates normal echotexture. No  hydronephrosis is identified.     The pancreas is obscured.     The visualized abdominal aorta and inferior vena cava appear normal in caliber.     No free fluid is present in the included abdomen.         Pregnancy has been complicated by none to date. .    ROS:  A comprehensive review of systems was negative except for that written in the HPI.     Past Medical History:   Diagnosis Date    Abnormal Papanicolaou smear of cervix     Anemia     Bipolar disorder ([facility] [address]     Gestational hypertension     Hypertension in pregnancy     pre-E with prior pregnancy    Ill-defined condition 2019    right shoulder injury/pain    Kidney disease     kidney stones    Postpartum depression     PTSD (post-traumatic stress disorder)     Trauma      Past Surgical History:   Procedure Laterality Date    HX APPENDECTOMY      HX  SECTION      HX LEEP PROCEDURE  2019    HX OTHER SURGICAL      HX UROLOGICAL      kidney stones     Social History     Socioeconomic History    Marital status:      Spouse name: Not on file    Number of children: Not on file    Years of education: Not on file    Highest education level: Not on file   Occupational History    Not on file   Tobacco Use    Smoking status: Never Smoker    Smokeless tobacco: Never Used   Substance and Sexual Activity    Alcohol use: Not Currently    Drug use: Never    Sexual activity: Yes   Other Topics Concern     Service Not Asked    Blood Transfusions Not Asked    Caffeine Concern Not Asked    Occupational Exposure Not Asked    Hobby Hazards Not Asked    Sleep Concern Not Asked    Stress Concern Not Asked    Weight Concern Not Asked    Special Diet Not Asked    Back Care Not Asked    Exercise Not Asked    Bike Helmet Not Asked    Seat Belt Not Asked    Self-Exams Not Asked   Social History Narrative    Not on file     Social Determinants of Health     Financial Resource Strain:     Difficulty of Paying Living Expenses: Not on file   Food Insecurity:     Worried About Running Out of Food in the Last Year: Not on file    Dash of Food in the Last Year: Not on file   Transportation Needs:     Lack of Transportation (Medical): Not on file    Lack of Transportation (Non-Medical):  Not on file   Physical Activity:     Days of Exercise per Week: Not on file    Minutes of Exercise per Session: Not on file   Stress:     Feeling of Stress : Not on file   Social Connections:     Frequency of Communication with Friends and Family: Not on file    Frequency of Social Gatherings with Friends and Family: Not on file    Attends Orthodoxy Services: Not on file    Active Member of Clubs or Organizations: Not on file    Attends Club or Organization Meetings: Not on file    Marital Status: Not on file   Intimate Partner Violence:     Fear of Current or Ex-Partner: Not on file    Emotionally Abused: Not on file   Gisell Manzanares Physically Abused: Not on file    Sexually Abused: Not on file   Housing Stability:     Unable to Pay for Housing in the Last Year: Not on file    Number of Places Lived in the Last Year: Not on file    Unstable Housing in the Last Year: Not on file     Family History   Problem Relation Age of Onset    Stroke Mother     Hypertension Mother     Hypertension Father     Mult Sclerosis Father      Allergies   Allergen Reactions    Latex Itching and Swelling    Iodine Itching and Swelling         Vitals:  Temp (24hrs), Av.2 °F (36.8 °C), Min:98.2 °F (36.8 °C), Max:98.2 °F (36.8 °C)     Patient Vitals for the past 24 hrs:   BP   01/10/22 1300 (!) 136/90   01/10/22 1143 (!) 154/95   01/10/22 1049 (!) 141/96   01/10/22 0540 120/73       I&O:  No intake/output data recorded. No intake/output data recorded. Exam:  Patient with distress.                Abdomen: soft, tender in RUQ/ epigastric area               Fundus: soft and non tender                              Right Upper Quadrant: tender               Perineum: No sign of blood or amniotic fluid                   Cervical Exam:    deferred                                                                  Lab/Data Review:  CMP:   Lab Results   Component Value Date/Time     01/10/2022 06:30 AM    K 3.4 (L) 01/10/2022 06:30 AM     01/10/2022 06:30 AM    CO2 22 01/10/2022 06:30 AM    AGAP 8 01/10/2022 06:30 AM     (H) 01/10/2022 06:30 AM    BUN 4 (L) 01/10/2022 06:30 AM    CREA 0.53 (L) 01/10/2022 06:30 AM    GFRAA >60 01/10/2022 06:30 AM    GFRNA >60 01/10/2022 06:30 AM    CA 8.0 (L) 01/10/2022 06:30 AM    ALB 2.5 (L) 01/10/2022 06:30 AM    TP 5.8 (L) 01/10/2022 06:30 AM    GLOB 3.3 01/10/2022 06:30 AM    AGRAT 0.8 (L) 01/10/2022 06:30 AM    ALT 31 01/10/2022 06:30 AM     CBC:   Lab Results   Component Value Date/Time    WBC 13.3 (H) 01/10/2022 06:30 AM    HGB 11.5 01/10/2022 06:30 AM    HCT 32.6 (L) 01/10/2022 06:30 AM    PLT 222 01/10/2022 06:30 AM     Liver Panel:   Lab Results   Component Value Date/Time    ALB 2.5 (L) 01/10/2022 06:30 AM    TP 5.8 (L) 01/10/2022 06:30 AM    GLOB 3.3 01/10/2022 06:30 AM    AGRAT 0.8 (L) 01/10/2022 06:30 AM    ALT 31 01/10/2022 06:30 AM     (H) 01/10/2022 06:30 AM     Pancreatic Markers:   Lab Results   Component Value Date/Time    LPSE 48 (L) 01/10/2022 06:30 AM       Assessment and Plan: Active Problems:    Symptomatic cholelithiasis (1/10/2022)        Pregnancy  Gallstones    Ultrasound to assess fetal status. Defer treatment of gallstones to GI/ Surgery. Discussed with patient significance of gallstones in pregnancy and typical management. All questions answered and patient states comprehension.

## 2022-01-11 ENCOUNTER — ANESTHESIA EVENT (OUTPATIENT)
Dept: ENDOSCOPY | Age: 32
DRG: 566 | End: 2022-01-11
Payer: MEDICAID

## 2022-01-11 ENCOUNTER — APPOINTMENT (OUTPATIENT)
Dept: GENERAL RADIOLOGY | Age: 32
DRG: 566 | End: 2022-01-11
Attending: INTERNAL MEDICINE
Payer: MEDICAID

## 2022-01-11 ENCOUNTER — ANESTHESIA (OUTPATIENT)
Dept: ENDOSCOPY | Age: 32
DRG: 566 | End: 2022-01-11
Payer: MEDICAID

## 2022-01-11 ENCOUNTER — APPOINTMENT (OUTPATIENT)
Dept: ENDOSCOPY | Age: 32
DRG: 566 | End: 2022-01-11
Attending: INTERNAL MEDICINE
Payer: MEDICAID

## 2022-01-11 LAB
ALBUMIN SERPL-MCNC: 2 G/DL (ref 3.5–5)
ALBUMIN SERPL-MCNC: 2 G/DL (ref 3.5–5)
ALBUMIN/GLOB SERPL: 0.7 (ref 1.1–2.2)
ALBUMIN/GLOB SERPL: 0.7 (ref 1.1–2.2)
ALP SERPL-CCNC: 129 U/L (ref 45–117)
ALP SERPL-CCNC: 131 U/L (ref 45–117)
ALT SERPL-CCNC: 19 U/L (ref 12–78)
ALT SERPL-CCNC: 20 U/L (ref 12–78)
ANION GAP SERPL CALC-SCNC: 9 MMOL/L (ref 5–15)
AST SERPL W P-5'-P-CCNC: 25 U/L (ref 15–37)
AST SERPL W P-5'-P-CCNC: 25 U/L (ref 15–37)
BASOPHILS # BLD: 0 K/UL (ref 0–0.1)
BASOPHILS NFR BLD: 0 % (ref 0–1)
BILIRUB DIRECT SERPL-MCNC: 2.6 MG/DL (ref 0–0.2)
BILIRUB SERPL-MCNC: 3.4 MG/DL (ref 0.2–1)
BILIRUB SERPL-MCNC: 3.4 MG/DL (ref 0.2–1)
BUN SERPL-MCNC: 3 MG/DL (ref 6–20)
BUN/CREAT SERPL: 7 (ref 12–20)
CA-I BLD-MCNC: 8.1 MG/DL (ref 8.5–10.1)
CHLORIDE SERPL-SCNC: 104 MMOL/L (ref 97–108)
CO2 SERPL-SCNC: 22 MMOL/L (ref 21–32)
CREAT SERPL-MCNC: 0.46 MG/DL (ref 0.55–1.02)
DIFFERENTIAL METHOD BLD: ABNORMAL
EOSINOPHIL # BLD: 0.1 K/UL (ref 0–0.4)
EOSINOPHIL NFR BLD: 1 % (ref 0–7)
ERYTHROCYTE [DISTWIDTH] IN BLOOD BY AUTOMATED COUNT: 13.5 % (ref 11.5–14.5)
GLOBULIN SER CALC-MCNC: 2.9 G/DL (ref 2–4)
GLOBULIN SER CALC-MCNC: 2.9 G/DL (ref 2–4)
GLUCOSE SERPL-MCNC: 92 MG/DL (ref 65–100)
HCT VFR BLD AUTO: 29.7 % (ref 35–47)
HGB BLD-MCNC: 10.2 G/DL (ref 11.5–16)
IMM GRANULOCYTES # BLD AUTO: 0.1 K/UL (ref 0–0.04)
IMM GRANULOCYTES NFR BLD AUTO: 1 % (ref 0–0.5)
LYMPHOCYTES # BLD: 0.7 K/UL (ref 0.8–3.5)
LYMPHOCYTES NFR BLD: 7 % (ref 12–49)
MAGNESIUM SERPL-MCNC: 1.7 MG/DL (ref 1.6–2.4)
MCH RBC QN AUTO: 29.7 PG (ref 26–34)
MCHC RBC AUTO-ENTMCNC: 34.3 G/DL (ref 30–36.5)
MCV RBC AUTO: 86.3 FL (ref 80–99)
MONOCYTES # BLD: 0.5 K/UL (ref 0–1)
MONOCYTES NFR BLD: 5 % (ref 5–13)
NEUTS SEG # BLD: 8.9 K/UL (ref 1.8–8)
NEUTS SEG NFR BLD: 86 % (ref 32–75)
NRBC # BLD: 0 K/UL (ref 0–0.01)
NRBC BLD-RTO: 0 PER 100 WBC
PLATELET # BLD AUTO: 229 K/UL (ref 150–400)
PMV BLD AUTO: 10.1 FL (ref 8.9–12.9)
POTASSIUM SERPL-SCNC: 3.4 MMOL/L (ref 3.5–5.1)
PROT SERPL-MCNC: 4.9 G/DL (ref 6.4–8.2)
PROT SERPL-MCNC: 4.9 G/DL (ref 6.4–8.2)
RBC # BLD AUTO: 3.44 M/UL (ref 3.8–5.2)
SODIUM SERPL-SCNC: 135 MMOL/L (ref 136–145)
WBC # BLD AUTO: 10.2 K/UL (ref 3.6–11)

## 2022-01-11 PROCEDURE — 77030012596 HC SPHNTOM BILI BSC -E: Performed by: INTERNAL MEDICINE

## 2022-01-11 PROCEDURE — 74011000250 HC RX REV CODE- 250: Performed by: INTERNAL MEDICINE

## 2022-01-11 PROCEDURE — 2709999900 HC NON-CHARGEABLE SUPPLY: Performed by: INTERNAL MEDICINE

## 2022-01-11 PROCEDURE — 76000 FLUOROSCOPY <1 HR PHYS/QHP: CPT

## 2022-01-11 PROCEDURE — 80076 HEPATIC FUNCTION PANEL: CPT

## 2022-01-11 PROCEDURE — 99232 SBSQ HOSP IP/OBS MODERATE 35: CPT | Performed by: COLON & RECTAL SURGERY

## 2022-01-11 PROCEDURE — 74011000250 HC RX REV CODE- 250: Performed by: ANESTHESIOLOGY

## 2022-01-11 PROCEDURE — 74011000250 HC RX REV CODE- 250: Performed by: NURSE ANESTHETIST, CERTIFIED REGISTERED

## 2022-01-11 PROCEDURE — 77030009038 HC CATH BILI STN RTVR BSC -C: Performed by: INTERNAL MEDICINE

## 2022-01-11 PROCEDURE — 76040000003: Performed by: INTERNAL MEDICINE

## 2022-01-11 PROCEDURE — 65270000029 HC RM PRIVATE

## 2022-01-11 PROCEDURE — 74011000258 HC RX REV CODE- 258: Performed by: INTERNAL MEDICINE

## 2022-01-11 PROCEDURE — 0FC98ZZ EXTIRPATION OF MATTER FROM COMMON BILE DUCT, VIA NATURAL OR ARTIFICIAL OPENING ENDOSCOPIC: ICD-10-PCS | Performed by: INTERNAL MEDICINE

## 2022-01-11 PROCEDURE — 74011250636 HC RX REV CODE- 250/636: Performed by: NURSE ANESTHETIST, CERTIFIED REGISTERED

## 2022-01-11 PROCEDURE — 77030007288 HC DEV LOK BILI BSC -A: Performed by: INTERNAL MEDICINE

## 2022-01-11 PROCEDURE — 80053 COMPREHEN METABOLIC PANEL: CPT

## 2022-01-11 PROCEDURE — 74011250637 HC RX REV CODE- 250/637: Performed by: INTERNAL MEDICINE

## 2022-01-11 PROCEDURE — 77030012595 HC SPHNTOM BILI BSC -D: Performed by: INTERNAL MEDICINE

## 2022-01-11 PROCEDURE — 76060000035 HC ANESTHESIA 2 TO 2.5 HR: Performed by: INTERNAL MEDICINE

## 2022-01-11 PROCEDURE — 85025 COMPLETE CBC W/AUTO DIFF WBC: CPT

## 2022-01-11 PROCEDURE — 83735 ASSAY OF MAGNESIUM: CPT

## 2022-01-11 PROCEDURE — 74011250636 HC RX REV CODE- 250/636: Performed by: INTERNAL MEDICINE

## 2022-01-11 PROCEDURE — 36415 COLL VENOUS BLD VENIPUNCTURE: CPT

## 2022-01-11 RX ORDER — SODIUM CHLORIDE 0.9 % (FLUSH) 0.9 %
5-40 SYRINGE (ML) INJECTION AS NEEDED
Status: DISCONTINUED | OUTPATIENT
Start: 2022-01-11 | End: 2022-01-13 | Stop reason: HOSPADM

## 2022-01-11 RX ORDER — FENTANYL CITRATE 50 UG/ML
INJECTION, SOLUTION INTRAMUSCULAR; INTRAVENOUS AS NEEDED
Status: DISCONTINUED | OUTPATIENT
Start: 2022-01-11 | End: 2022-01-11 | Stop reason: HOSPADM

## 2022-01-11 RX ORDER — SODIUM CHLORIDE 0.9 % (FLUSH) 0.9 %
5-40 SYRINGE (ML) INJECTION EVERY 8 HOURS
Status: DISCONTINUED | OUTPATIENT
Start: 2022-01-11 | End: 2022-01-12

## 2022-01-11 RX ORDER — DIPHENHYDRAMINE HYDROCHLORIDE 50 MG/ML
INJECTION, SOLUTION INTRAMUSCULAR; INTRAVENOUS
Status: DISPENSED
Start: 2022-01-11 | End: 2022-01-12

## 2022-01-11 RX ORDER — SUCCINYLCHOLINE CHLORIDE 20 MG/ML
INJECTION INTRAMUSCULAR; INTRAVENOUS AS NEEDED
Status: DISCONTINUED | OUTPATIENT
Start: 2022-01-11 | End: 2022-01-11 | Stop reason: HOSPADM

## 2022-01-11 RX ORDER — LIDOCAINE HYDROCHLORIDE 10 MG/ML
0.1 INJECTION, SOLUTION EPIDURAL; INFILTRATION; INTRACAUDAL; PERINEURAL AS NEEDED
Status: CANCELLED | OUTPATIENT
Start: 2022-01-11

## 2022-01-11 RX ORDER — SODIUM CHLORIDE, SODIUM LACTATE, POTASSIUM CHLORIDE, CALCIUM CHLORIDE 600; 310; 30; 20 MG/100ML; MG/100ML; MG/100ML; MG/100ML
INJECTION, SOLUTION INTRAVENOUS
Status: DISCONTINUED | OUTPATIENT
Start: 2022-01-11 | End: 2022-01-11 | Stop reason: HOSPADM

## 2022-01-11 RX ORDER — PROPOFOL 10 MG/ML
INJECTION, EMULSION INTRAVENOUS
Status: COMPLETED
Start: 2022-01-11 | End: 2022-01-11

## 2022-01-11 RX ORDER — DIPHENHYDRAMINE HYDROCHLORIDE 50 MG/ML
INJECTION, SOLUTION INTRAMUSCULAR; INTRAVENOUS AS NEEDED
Status: DISCONTINUED | OUTPATIENT
Start: 2022-01-11 | End: 2022-01-11 | Stop reason: HOSPADM

## 2022-01-11 RX ORDER — PROPOFOL 10 MG/ML
INJECTION, EMULSION INTRAVENOUS AS NEEDED
Status: DISCONTINUED | OUTPATIENT
Start: 2022-01-11 | End: 2022-01-11 | Stop reason: HOSPADM

## 2022-01-11 RX ORDER — SODIUM CHLORIDE 0.9 % (FLUSH) 0.9 %
5-40 SYRINGE (ML) INJECTION AS NEEDED
Status: CANCELLED | OUTPATIENT
Start: 2022-01-11

## 2022-01-11 RX ORDER — FENTANYL CITRATE 50 UG/ML
INJECTION, SOLUTION INTRAMUSCULAR; INTRAVENOUS
Status: COMPLETED
Start: 2022-01-11 | End: 2022-01-11

## 2022-01-11 RX ORDER — DIPHENHYDRAMINE HYDROCHLORIDE 50 MG/ML
12.5 INJECTION, SOLUTION INTRAMUSCULAR; INTRAVENOUS AS NEEDED
Status: ACTIVE | OUTPATIENT
Start: 2022-01-11 | End: 2022-01-11

## 2022-01-11 RX ORDER — SODIUM CHLORIDE 0.9 % (FLUSH) 0.9 %
5-40 SYRINGE (ML) INJECTION EVERY 8 HOURS
Status: CANCELLED | OUTPATIENT
Start: 2022-01-11

## 2022-01-11 RX ORDER — NORETHINDRONE AND ETHINYL ESTRADIOL 0.5-0.035
5 KIT ORAL AS NEEDED
Status: DISCONTINUED | OUTPATIENT
Start: 2022-01-11 | End: 2022-01-13 | Stop reason: HOSPADM

## 2022-01-11 RX ORDER — LIDOCAINE HYDROCHLORIDE 20 MG/ML
INJECTION, SOLUTION EPIDURAL; INFILTRATION; INTRACAUDAL; PERINEURAL AS NEEDED
Status: DISCONTINUED | OUTPATIENT
Start: 2022-01-11 | End: 2022-01-11 | Stop reason: HOSPADM

## 2022-01-11 RX ORDER — ONDANSETRON 2 MG/ML
4 INJECTION INTRAMUSCULAR; INTRAVENOUS AS NEEDED
Status: DISCONTINUED | OUTPATIENT
Start: 2022-01-11 | End: 2022-01-13 | Stop reason: HOSPADM

## 2022-01-11 RX ORDER — PROPOFOL 10 MG/ML
INJECTION, EMULSION INTRAVENOUS
Status: DISPENSED
Start: 2022-01-11 | End: 2022-01-12

## 2022-01-11 RX ADMIN — PROPOFOL 50 MG: 10 INJECTION, EMULSION INTRAVENOUS at 13:31

## 2022-01-11 RX ADMIN — PIPERACILLIN SODIUM AND TAZOBACTAM SODIUM 3.38 G: 3; .375 INJECTION, POWDER, LYOPHILIZED, FOR SOLUTION INTRAVENOUS at 19:56

## 2022-01-11 RX ADMIN — PROPOFOL 40 MG: 10 INJECTION, EMULSION INTRAVENOUS at 13:37

## 2022-01-11 RX ADMIN — PROPOFOL 180 MG: 10 INJECTION, EMULSION INTRAVENOUS at 13:16

## 2022-01-11 RX ADMIN — PROPOFOL 30 MG: 10 INJECTION, EMULSION INTRAVENOUS at 13:20

## 2022-01-11 RX ADMIN — LABETALOL HYDROCHLORIDE 400 MG: 200 TABLET, FILM COATED ORAL at 09:27

## 2022-01-11 RX ADMIN — FENTANYL CITRATE 50 MCG: 50 INJECTION, SOLUTION INTRAMUSCULAR; INTRAVENOUS at 13:31

## 2022-01-11 RX ADMIN — PIPERACILLIN SODIUM AND TAZOBACTAM SODIUM 3.38 G: 3; .375 INJECTION, POWDER, LYOPHILIZED, FOR SOLUTION INTRAVENOUS at 03:23

## 2022-01-11 RX ADMIN — PROPOFOL 20 MG: 10 INJECTION, EMULSION INTRAVENOUS at 13:17

## 2022-01-11 RX ADMIN — SODIUM CHLORIDE, PRESERVATIVE FREE 10 ML: 5 INJECTION INTRAVENOUS at 06:01

## 2022-01-11 RX ADMIN — PROPOFOL 50 MG: 10 INJECTION, EMULSION INTRAVENOUS at 13:22

## 2022-01-11 RX ADMIN — SUCCINYLCHOLINE CHLORIDE 120 MG: 20 INJECTION, SOLUTION INTRAMUSCULAR; INTRAVENOUS at 13:18

## 2022-01-11 RX ADMIN — PROPOFOL 30 MG: 10 INJECTION, EMULSION INTRAVENOUS at 13:34

## 2022-01-11 RX ADMIN — SODIUM CHLORIDE, PRESERVATIVE FREE 10 ML: 5 INJECTION INTRAVENOUS at 21:29

## 2022-01-11 RX ADMIN — DIPHENHYDRAMINE HYDROCHLORIDE 50 MG: 50 INJECTION, SOLUTION INTRAMUSCULAR; INTRAVENOUS at 13:22

## 2022-01-11 RX ADMIN — SODIUM CHLORIDE, POTASSIUM CHLORIDE, SODIUM LACTATE AND CALCIUM CHLORIDE 125 ML/HR: 600; 310; 30; 20 INJECTION, SOLUTION INTRAVENOUS at 11:31

## 2022-01-11 RX ADMIN — SODIUM CHLORIDE, POTASSIUM CHLORIDE, SODIUM LACTATE AND CALCIUM CHLORIDE: 600; 310; 30; 20 INJECTION, SOLUTION INTRAVENOUS at 13:16

## 2022-01-11 RX ADMIN — PROPOFOL 30 MG: 10 INJECTION, EMULSION INTRAVENOUS at 13:39

## 2022-01-11 RX ADMIN — SODIUM CHLORIDE, PRESERVATIVE FREE 10 ML: 5 INJECTION INTRAVENOUS at 14:00

## 2022-01-11 RX ADMIN — ONDANSETRON 4 MG: 2 INJECTION INTRAMUSCULAR; INTRAVENOUS at 09:36

## 2022-01-11 RX ADMIN — PROPOFOL 30 MG: 10 INJECTION, EMULSION INTRAVENOUS at 13:42

## 2022-01-11 RX ADMIN — PROPOFOL 30 MG: 10 INJECTION, EMULSION INTRAVENOUS at 13:46

## 2022-01-11 RX ADMIN — LABETALOL HYDROCHLORIDE 400 MG: 200 TABLET, FILM COATED ORAL at 21:29

## 2022-01-11 RX ADMIN — PROPOFOL 50 MG: 10 INJECTION, EMULSION INTRAVENOUS at 13:49

## 2022-01-11 RX ADMIN — PROPOFOL 50 MG: 10 INJECTION, EMULSION INTRAVENOUS at 13:28

## 2022-01-11 RX ADMIN — HYDROMORPHONE HYDROCHLORIDE 1 MG: 1 INJECTION, SOLUTION INTRAMUSCULAR; INTRAVENOUS; SUBCUTANEOUS at 09:27

## 2022-01-11 RX ADMIN — SODIUM CHLORIDE, PRESERVATIVE FREE 10 ML: 5 INJECTION INTRAVENOUS at 15:25

## 2022-01-11 RX ADMIN — PROPOFOL 50 MG: 10 INJECTION, EMULSION INTRAVENOUS at 13:25

## 2022-01-11 RX ADMIN — FENTANYL CITRATE 50 MCG: 50 INJECTION, SOLUTION INTRAMUSCULAR; INTRAVENOUS at 13:22

## 2022-01-11 RX ADMIN — PIPERACILLIN SODIUM AND TAZOBACTAM SODIUM 3.38 G: 3; .375 INJECTION, POWDER, LYOPHILIZED, FOR SOLUTION INTRAVENOUS at 15:13

## 2022-01-11 RX ADMIN — HYDROMORPHONE HYDROCHLORIDE 1 MG: 1 INJECTION, SOLUTION INTRAMUSCULAR; INTRAVENOUS; SUBCUTANEOUS at 01:46

## 2022-01-11 RX ADMIN — HYDROMORPHONE HYDROCHLORIDE 1 MG: 1 INJECTION, SOLUTION INTRAMUSCULAR; INTRAVENOUS; SUBCUTANEOUS at 21:29

## 2022-01-11 RX ADMIN — PROPOFOL 50 MG: 10 INJECTION, EMULSION INTRAVENOUS at 13:19

## 2022-01-11 RX ADMIN — NIFEDIPINE 30 MG: 30 TABLET, FILM COATED, EXTENDED RELEASE ORAL at 09:27

## 2022-01-11 RX ADMIN — LIDOCAINE HYDROCHLORIDE 60 MG: 20 INJECTION, SOLUTION EPIDURAL; INFILTRATION; INTRACAUDAL; PERINEURAL at 13:16

## 2022-01-11 RX ADMIN — PROPOFOL 50 MG: 10 INJECTION, EMULSION INTRAVENOUS at 13:18

## 2022-01-11 NOTE — PROGRESS NOTES
Progress Note    Patient: Thu Farmer MRN: [id number]  SSN: [id number]    YOB: 1990  Age: 27 y.o. Sex: female      Admit Date: 1/10/2022    LOS: 1 day     Subjective:   GI is following for choledocolothiasis. ERCP 1/11 - Pus and sludge in the bile duct. Sphincterotomy was done and bile duct  was swept with balloon several times to clear the bile duct. Cystic duct did not fill with contrast.     Patient seen in room, still groggy from anesthesia. ERCP done today, result above.  at bedside. Total bilirubin this am was 3.4. Will re-check labs in am.    History of Present Illness: Thu Farmer is a 27 y.o. female who is seen in consultation for Choledocholithiasis. Patient seen in the ED she is 13 weeks gestation. She presented to the Ed with severe right upper quadrant pain radiating to her back. She does report nausea and vomiting. She states she has been unable to keep food or medications down. Her pain started yesterday. She denies recent fevers, or chills. Abdominal Ultrasound shows gallstones and CBD dilatation 0.8 cm, intrahepatic ductal dilatation suspected. She does have elevated LFT's: Total bilirubin 2.1, ALT 31, AST 84, Alk Phosphatase 129, lipase 48. She has a past medical history significant for kidney stones and gestational hypertension. Her abdomen is soft with tenderness to light palpation. Possible ERCP, high risk to fetus d/t radiation exposure in first trimester. If symptoms worsen contact surgery to consider cholecystectomy or bile duct drain placement until after first trimester.  Repeat LFT's in the am. Keep NPO, continue IV hydration.     Abdominal Ultrasound 1/10/2022:  IMPRESSION  Gallstone.  No pericholecystic inflammatory changes noted.  Common  bile duct dilatation.  Intrahepatic ductal dilatation suspected.     Objective:     Vitals:    01/11/22 1423 01/11/22 1429 01/11/22 1430 01/11/22 1435   BP: 111/61 122/78 112/64 104/71   Pulse: (!) 125 (!) 125 (!) 121 (!) 119   Resp: 18 14 19 20   Temp: 98.9 °F (37.2 °C) 97 °F (36.1 °C)  97.6 °F (36.4 °C)   SpO2: 96% 100% 96% 96%   Weight:       Height:            Intake and Output:  Current Shift: No intake/output data recorded. Last three shifts: 01/09 1901 - 01/11 0700  In: 1100 [I.V.:1100]  Out: -     Physical Exam:   Skin:  Extremities and face reveal no rashes. No spann erythema. HEENT: Sclerae anicteric. Extra-occular muscles are intact. No abnormal pigmentation of the lips. The neck is supple. Cardiovascular: Regular rate and rhythm. Respiratory:  Comfortable breathing with no accessory muscle use. GI:  Abdomen nondistended, soft, and tender. No enlargement of the liver or spleen. No masses palpable. Rectal:  Deferred  Musculoskeletal: Extremities have good range of motion. Neurological:  Gross memory appears intact. Patient is alert and oriented. Psychiatric:  Mood appears appropriate with judgement intact. Lymphatic:  No visible adenopathy      Lab/Data Review:  Recent Results (from the past 24 hour(s))   METABOLIC PANEL, COMPREHENSIVE    Collection Time: 01/11/22  7:23 AM   Result Value Ref Range    Sodium 135 (L) 136 - 145 mmol/L    Potassium 3.4 (L) 3.5 - 5.1 mmol/L    Chloride 104 97 - 108 mmol/L    CO2 22 21 - 32 mmol/L    Anion gap 9 5 - 15 mmol/L    Glucose 92 65 - 100 mg/dL    BUN 3 (L) 6 - 20 mg/dL    Creatinine 0.46 (L) 0.55 - 1.02 mg/dL    BUN/Creatinine ratio 7 (L) 12 - 20      GFR est AA >60 >60 ml/min/1.73m2    GFR est non-AA >60 >60 ml/min/1.73m2    Calcium 8.1 (L) 8.5 - 10.1 mg/dL    Bilirubin, total 3.4 (H) 0.2 - 1.0 mg/dL    AST (SGOT) 25 15 - 37 U/L    ALT (SGPT) 19 12 - 78 U/L    Alk.  phosphatase 129 (H) 45 - 117 U/L    Protein, total 4.9 (L) 6.4 - 8.2 g/dL    Albumin 2.0 (L) 3.5 - 5.0 g/dL    Globulin 2.9 2.0 - 4.0 g/dL    A-G Ratio 0.7 (L) 1.1 - 2.2     MAGNESIUM    Collection Time: 01/11/22  7:23 AM   Result Value Ref Range    Magnesium 1.7 1.6 - 2.4 mg/dL   CBC WITH AUTOMATED DIFF    Collection Time: 01/11/22  7:23 AM   Result Value Ref Range    WBC 10.2 3.6 - 11.0 K/uL    RBC 3.44 (L) 3.80 - 5.20 M/uL    HGB 10.2 (L) 11.5 - 16.0 g/dL    HCT 29.7 (L) 35.0 - 47.0 %    MCV 86.3 80.0 - 99.0 FL    MCH 29.7 26.0 - 34.0 PG    MCHC 34.3 30.0 - 36.5 g/dL    RDW 13.5 11.5 - 14.5 %    PLATELET 444 242 - 831 K/uL    MPV 10.1 8.9 - 12.9 FL    NRBC 0.0 0.0  WBC    ABSOLUTE NRBC 0.00 0.00 - 0.01 K/uL    NEUTROPHILS 86 (H) 32 - 75 %    LYMPHOCYTES 7 (L) 12 - 49 %    MONOCYTES 5 5 - 13 %    EOSINOPHILS 1 0 - 7 %    BASOPHILS 0 0 - 1 %    IMMATURE GRANULOCYTES 1 (H) 0 - 0.5 %    ABS. NEUTROPHILS 8.9 (H) 1.8 - 8.0 K/UL    ABS. LYMPHOCYTES 0.7 (L) 0.8 - 3.5 K/UL    ABS. MONOCYTES 0.5 0.0 - 1.0 K/UL    ABS. EOSINOPHILS 0.1 0.0 - 0.4 K/UL    ABS. BASOPHILS 0.0 0.0 - 0.1 K/UL    ABS. IMM. GRANS. 0.1 (H) 0.00 - 0.04 K/UL    DF AUTOMATED     HEPATIC FUNCTION PANEL    Collection Time: 01/11/22  7:23 AM   Result Value Ref Range    Protein, total 4.9 (L) 6.4 - 8.2 g/dL    Albumin 2.0 (L) 3.5 - 5.0 g/dL    Globulin 2.9 2.0 - 4.0 g/dL    A-G Ratio 0.7 (L) 1.1 - 2.2      Bilirubin, total 3.4 (H) 0.2 - 1.0 mg/dL    Bilirubin, direct 2.6 (H) 0.0 - 0.2 mg/dL    Alk. phosphatase 131 (H) 45 - 117 U/L    AST (SGOT) 25 15 - 37 U/L    ALT (SGPT) 20 12 - 78 U/L              XR FLUOROSCOPY UNDER 60 MINUTES   Final Result      US PREG UTS LTD   Final Result   Single live intrauterine pregnancy      US ABD LTD   Final Result   Gallstone. No pericholecystic inflammatory changes noted. Common   bile duct dilatation. Intrahepatic ductal dilatation suspected. Assessment:     Active Problems:    Symptomatic cholelithiasis (1/10/2022)        Plan:   1. Choledocholithiasis      -ERCP 1/11 - Pus and sludge in the bile duct. Sphincterotomy was done and bile duct was swept with balloon several times to clear the bile duct.  Cystic duct did not fill with contrast.       -LFT's in the am      -clear liquid diet then low fat tonight as tolerated. -Continue IV hydration      -Continue IV Zosyn  2. 13 Weeks Gestation       -OB/GYN following. 3. Gallstones       -Surgery consulted    Patient discussed with Dr Jessica Streeter and agrees to above impression and plan. Thank you for allowing me to participate in this patients care    Signed By: Trevon Vasquez.  RA Mix     January 11, 2022

## 2022-01-11 NOTE — PROGRESS NOTES
Progress Note    Patient: Tavia Batres MRN: [id number]  SSN: [id number]    YOB: 1990  Age: 27 y.o. Sex: female      Admit Date: 1/10/2022    LOS: 1 day     Subjective:   Hospital day 2 choledocholithiasis  Patient continues to complain of right upper quadrant pain  Denies nausea or vomiting currently    Objective:     Vitals:    01/11/22 0050 01/11/22 0124 01/11/22 0825 01/11/22 0923   BP: 107/87 108/64  (!) 120/102   Pulse: (!) 102 (!) 108  (!) 135   Resp: 13 16  20   Temp:  98.2 °F (36.8 °C)  98.7 °F (37.1 °C)   SpO2: 97% 98% 98% 97%   Weight:       Height:            Intake and Output:  Current Shift: No intake/output data recorded.   Last three shifts: 01/09 1901 - 01/11 0700  In: 1100 [I.V.:1100]  Out: -     Review of Systems:  ROS     Physical Exam:   Abdomen is soft, tender palpation epigastrium and right upper quadrant, no rebound or guarding, nondistended    Lab/Data Review:  Recent Results (from the past 24 hour(s))   URINALYSIS W/ RFLX MICROSCOPIC    Collection Time: 01/10/22  3:36 PM   Result Value Ref Range    Color Damari      Appearance Clear Clear      Specific gravity 1.029 1.003 - 1.030      pH (UA) 6.0 5.0 - 8.0      Protein 30 (A) Negative mg/dL    Glucose Negative Negative mg/dL    Ketone 80 (A) Negative mg/dL    Bilirubin Small (A) Negative      Blood Negative Negative      Urobilinogen 4.0 (H) 0.1 - 1.0 EU/dL    Nitrites Negative Negative      Leukocyte Esterase Negative Negative      WBC 5-10 0 - 4 /hpf    RBC 0-5 0 - 5 /hpf    Bacteria Negative Negative /hpf    Mucus 1+ /lpf   URINE MICROSCOPIC    Collection Time: 01/10/22  3:36 PM   Result Value Ref Range    WBC 5-10 0 - 4 /hpf    RBC 0-5 0 - 5 /hpf    Bacteria Negative Negative /hpf    Mucus 1+ (A) Negative /lpf   METABOLIC PANEL, COMPREHENSIVE    Collection Time: 01/11/22  7:23 AM   Result Value Ref Range    Sodium 135 (L) 136 - 145 mmol/L    Potassium 3.4 (L) 3.5 - 5.1 mmol/L    Chloride 104 97 - 108 mmol/L CO2 22 21 - 32 mmol/L    Anion gap 9 5 - 15 mmol/L    Glucose 92 65 - 100 mg/dL    BUN 3 (L) 6 - 20 mg/dL    Creatinine 0.46 (L) 0.55 - 1.02 mg/dL    BUN/Creatinine ratio 7 (L) 12 - 20      GFR est AA >60 >60 ml/min/1.73m2    GFR est non-AA >60 >60 ml/min/1.73m2    Calcium 8.1 (L) 8.5 - 10.1 mg/dL    Bilirubin, total 3.4 (H) 0.2 - 1.0 mg/dL    AST (SGOT) 25 15 - 37 U/L    ALT (SGPT) 19 12 - 78 U/L    Alk. phosphatase 129 (H) 45 - 117 U/L    Protein, total 4.9 (L) 6.4 - 8.2 g/dL    Albumin 2.0 (L) 3.5 - 5.0 g/dL    Globulin 2.9 2.0 - 4.0 g/dL    A-G Ratio 0.7 (L) 1.1 - 2.2     MAGNESIUM    Collection Time: 01/11/22  7:23 AM   Result Value Ref Range    Magnesium 1.7 1.6 - 2.4 mg/dL   CBC WITH AUTOMATED DIFF    Collection Time: 01/11/22  7:23 AM   Result Value Ref Range    WBC 10.2 3.6 - 11.0 K/uL    RBC 3.44 (L) 3.80 - 5.20 M/uL    HGB 10.2 (L) 11.5 - 16.0 g/dL    HCT 29.7 (L) 35.0 - 47.0 %    MCV 86.3 80.0 - 99.0 FL    MCH 29.7 26.0 - 34.0 PG    MCHC 34.3 30.0 - 36.5 g/dL    RDW 13.5 11.5 - 14.5 %    PLATELET 332 019 - 226 K/uL    MPV 10.1 8.9 - 12.9 FL    NRBC 0.0 0.0  WBC    ABSOLUTE NRBC 0.00 0.00 - 0.01 K/uL    NEUTROPHILS 86 (H) 32 - 75 %    LYMPHOCYTES 7 (L) 12 - 49 %    MONOCYTES 5 5 - 13 %    EOSINOPHILS 1 0 - 7 %    BASOPHILS 0 0 - 1 %    IMMATURE GRANULOCYTES 1 (H) 0 - 0.5 %    ABS. NEUTROPHILS 8.9 (H) 1.8 - 8.0 K/UL    ABS. LYMPHOCYTES 0.7 (L) 0.8 - 3.5 K/UL    ABS. MONOCYTES 0.5 0.0 - 1.0 K/UL    ABS. EOSINOPHILS 0.1 0.0 - 0.4 K/UL    ABS. BASOPHILS 0.0 0.0 - 0.1 K/UL    ABS. IMM. GRANS. 0.1 (H) 0.00 - 0.04 K/UL    DF AUTOMATED     HEPATIC FUNCTION PANEL    Collection Time: 01/11/22  7:23 AM   Result Value Ref Range    Protein, total 4.9 (L) 6.4 - 8.2 g/dL    Albumin 2.0 (L) 3.5 - 5.0 g/dL    Globulin 2.9 2.0 - 4.0 g/dL    A-G Ratio 0.7 (L) 1.1 - 2.2      Bilirubin, total 3.4 (H) 0.2 - 1.0 mg/dL    Bilirubin, direct 2.6 (H) 0.0 - 0.2 mg/dL    Alk.  phosphatase 131 (H) 45 - 117 U/L    AST (SGOT) 25 15 - 37 U/L    ALT (SGPT) 20 12 - 78 U/L            Assessment:     Active Problems:    Symptomatic cholelithiasis (1/10/2022)        Plan:   25-year-old female 13 weeks gestation continue upper abdominal pain with elevated bilirubin, bilirubin up to 3.4 up from 2.1 yesterday  And extensive discussion with the patient and her  who was in the room and explained that her bilirubin is still high which indicates the stone is still blocking the bile duct. I told her that if this does not resolve she is going need to have an ERCP to clear her duct. I did tell her that she is almost at the end of first trimester so I think that the benefits of the procedure outweigh the risk. We will reassess with blood work and physical exam in a.m.     Signed By: Chrissie Flores MD     January 11, 2022

## 2022-01-11 NOTE — PROGRESS NOTES
Hospitalist Progress Note    NAME: Sylvie Terrazas   :  1990   MRN:  [id number]           Subjective:     Chief Complaint / Reason for Physician Visit  Patient seen and examined at bedside, of note patient's abdominal pain improved on Dilaudid, now with right upper quadrant, however patient still gets severe pain once Dilaudid wears off, no nausea or vomiting. Discussed with RN events overnight. Review of Systems:  Symptom Y/N Comments  Symptom Y/N Comments   Fever/Chills N   Chest Pain N    Poor Appetite Y   Edema N    Cough N   Abdominal Pain Y    Sputum N   Joint Pain N    SOB/MAYER N   Pruritis/Rash N    Nausea/vomit N   Tolerating PT/OT NA    Diarrhea N   Tolerating Diet NA    Constipation N   Other       Could NOT obtain due to:    Patient denies any fevers chills nausea vomiting lightheadedness dizziness dyspnea orthopnea paroxysmal nocturnal dyspnea chest pain palpitations headache focal weakness loss of sensation auditory or visual symptoms stool or urinary complaints  Objective:     VITALS:   Last 24hrs VS reviewed since prior progress note.  Most recent are:  Patient Vitals for the past 24 hrs:   Temp Pulse Resp BP SpO2   22 0825     98 %   22 0124 98.2 °F (36.8 °C) (!) 108 16 108/64 98 %   22 0050  (!) 102 13 107/87 97 %   22 0010  (!) 115 18 105/69 98 %   01/10/22 2320  (!) 115 16 117/75 97 %   01/10/22 2215  (!) 109 13 117/69 96 %   01/10/22 2117  (!) 120 16 109/73 95 %   01/10/22 2047  (!) 124  113/75    01/10/22 2034  (!) 124 19 113/75 96 %   01/10/22 1947 98.9 °F (37.2 °C)       01/10/22 1932  (!) 110 18 118/82 97 %   01/10/22 1907  (!) 106 17 117/85 96 %   01/10/22 1800  (!) 112 18 118/82 97 %   01/10/22 1700  (!) 108 14 100/64 96 %   01/10/22 1600  (!) 108 14 106/74 96 %   01/10/22 1500  (!) 107 13 114/85 96 %   01/10/22 1400  (!) 106 18 127/87 96 %   01/10/22 1300  (!) 111 18 (!) 136/90 96 %   01/10/22 1143  89  (!) 154/95  01/10/22 1049  (!) 55 20 (!) 141/96 98 %       Intake/Output Summary (Last 24 hours) at 1/11/2022 0848  Last data filed at 1/10/2022 1656  Gross per 24 hour   Intake 100 ml   Output    Net 100 ml        PHYSICAL EXAM:  General: Patient appears uncomfortable    EENT:  EOMI. Anicteric sclerae. MMM  Resp:  CTA bilaterally, no wheezing or rales. No accessory muscle use  CV:  Regular  rhythm,  S1 plus S2, no murmurs rubs or gallops no edema  GI:  Soft, Non distended, appreciable tenderness in right upper quadrant with no appreciable rebound or guarding +Bowel sounds  Neurologic:  Alert and oriented X 3, normal speech,   Psych:   Good insight. Not anxious nor agitated  Skin:  No rashes. No jaundice    Procedures: see electronic medical records for all procedures/Xrays and details which were not copied into this note but were reviewed prior to creation of Plan. LABS:  I reviewed today's most current labs and imaging studies. Pertinent labs include:  Recent Labs     01/10/22  0630 01/09/22  0723   WBC 13.3* 11.0   HGB 11.5 12.9   HCT 32.6* 35.8    245     Recent Labs     01/10/22  0630 01/09/22  0723    135*   K 3.4* 3.7    104   CO2 22 22   * 133*   BUN 4* 7   CREA 0.53* 0.62   CA 8.0* 9.6   ALB 2.5* 3.0*   TBILI 2.1* 0.2   ALT 31 13       Signed: Fox Sanchez MD    US RUQ:  IMPRESSION  Gallstone. No pericholecystic inflammatory changes noted. Common  bile duct dilatation. Intrahepatic ductal dilatation suspected. US pregnancy:Transabdominal study shows a single intrauterine pregnancy heart rate 160. Calculated gestational age of 12 weeks 2 days. KIRT is 7/9/2022. Placenta is  homogeneous, posterior position of normal thickness.     Fluid volume is normal. No obvious fetal anomaly on this limited study.  No  subchorionic, myometrial adnexal finding    Reviewed most current lab test results and cultures  YES  Reviewed most current radiology test results   YES  Review and summation of old records today    NO  Reviewed patient's current orders and MAR    YES  PMH/SH reviewed - no change compared to H&P      Assessment / Plan:  Symptomatic cholelithiasis/choledocholithiasispatient presents with above-mentioned symptomatology and based on patient clinical presentation as well as physical examination in conjunction with radiographic findings consistent with symptomatic cholelithiasis with concerns for choledocholithiasis given evidence of biliary duct dilatation as well as elevated LFTs, patient has been evaluated by gastroenterology as well as general surgery, the complicating factor is pregnancy, patient would normally get an ERCP however want to minimize radiation exposure for fetus  Maintenance IV fluids  Dilaudid as needed for pain relief  N.p.o.  Continue to trend LFTs  Continue Zosyn for antibiotic coverage  Gastroenterology consult appreciated, will continue conservative management given pregnancy status  General surgery consult appreciated, continue to follow recommendations     Hypertensioncontinue home antihypertensive medications     Pregnant at 13 weeks of gestation continue prenatal vitamins  Ultrasound reviewed  OB/GYN consult appreciated, continue to follow     Hypokalemia follow-up repeat potassium to assess for resolution     FENNPO, lactated Ringer's at 125 cc/h, replete potassium and magnesium  Full code  Dispositionpending clinical improvement, home once cleared by gastroenterology/general Surgery    25.0 - 29.9 Overweight / Body mass index is 27.44 kg/m². Code status: Full  Prophylaxis: Lovenox  Recommended Disposition: Home w/Family     ________________________________________________________________________  Care Plan discussed with:    Comments   Patient x    Family      RN x    Care Manager x    Consultant  x                     x Multidiciplinary team rounds were held today with , nursing, pharmacist and clinical coordinator.   Patient's plan of care was discussed; medications were reviewed and discharge planning was addressed.      ________________________________________________________________________  Total NON critical care TIME:  35   Minutes      Comments   >50% of visit spent in counseling and coordination of care x    ________________________________________________________________________  Jagruti Celis MD

## 2022-01-11 NOTE — ANESTHESIA POSTPROCEDURE EVALUATION
Procedure(s):  ENDOSCOPIC RETROGRADE CHOLANGIOPANCREATOGRAPHY (ERCP)  ENDOSCOPIC SPHINCTEROTOMY.     general    Anesthesia Post Evaluation      Multimodal analgesia: multimodal analgesia used between 6 hours prior to anesthesia start to PACU discharge  Patient location during evaluation: PACU  Patient participation: complete - patient participated  Level of consciousness: awake  Pain score: 0  Pain management: adequate  Airway patency: patent  Anesthetic complications: no  Cardiovascular status: acceptable  Respiratory status: acceptable  Hydration status: acceptable  Post anesthesia nausea and vomiting:  controlled  Final Post Anesthesia Temperature Assessment:  Normothermia (36.0-37.5 degrees C)      INITIAL Post-op Vital signs:   Vitals Value Taken Time   /71 01/11/22 1435   Temp 36.4 °C (97.6 °F) 01/11/22 1435   Pulse 119 01/11/22 1435   Resp 20 01/11/22 1435   SpO2 96 % 01/11/22 1435

## 2022-01-11 NOTE — CONSULTS
Consult Date: 1/10/2022    Consults    Subjective   The patient is a 34-year-old female who presents to the emergency department with complaints of right upper quadrant pain rating to the back. In addition along with the pain she had nausea and vomiting and at presentation stated she been unable to keep food down for 24 hours. She had an abdominal ultrasound which demonstrated gallstones and CBD dilation. Intrahepatic ductal dilation also suspected. No evidence of cholecystitis. She had elevated LFTs with total bilirubin of 2.1 and alkaline phos is 129. Lipase was normal.    She is currently pregnant at 13 weeks gestation. Past medical history significant for history of kidney stones, gestational hypertension.   Past Medical History:   Diagnosis Date    Abnormal Papanicolaou smear of cervix     Anemia     Bipolar disorder ([facility] [address]     Gestational hypertension     Hypertension in pregnancy     pre-E with prior pregnancy    Ill-defined condition 2019    right shoulder injury/pain    Kidney disease     kidney stones    Postpartum depression     PTSD (post-traumatic stress disorder)     Trauma       Past Surgical History:   Procedure Laterality Date    HX APPENDECTOMY      HX  SECTION      HX LEEP PROCEDURE  2019    HX OTHER SURGICAL      HX UROLOGICAL      kidney stones     Family History   Problem Relation Age of Onset    Stroke Mother     Hypertension Mother     Hypertension Father     Mult Sclerosis Father       Social History     Tobacco Use    Smoking status: Never Smoker    Smokeless tobacco: Never Used   Substance Use Topics    Alcohol use: Not Currently       Current Facility-Administered Medications   Medication Dose Route Frequency Provider Last Rate Last Admin    lactated Ringers infusion  125 mL/hr IntraVENous CONTINUOUS Lieutenant Derick  mL/hr at 01/10/22 1107 125 mL/hr at 01/10/22 1107    labetaloL (NORMODYNE) tablet 400 mg  400 mg Oral BID Sai Gipson MD   400 mg at 01/10/22 1143    NIFEdipine ER (PROCARDIA XL) tablet 30 mg  30 mg Oral DAILY Odell Adrian MD   30 mg at 01/10/22 1143    [START ON 1/11/2022] pnv 6-iron-FA-B12-calcium-D3 35 mg(d)/1.1 mg -600 unit (n) TbSQ 1 Tablet  1 Tablet Oral DAILY Sai Gipson MD        sodium chloride (NS) flush 5-40 mL  5-40 mL IntraVENous Amrita Bolanos MD        sodium chloride (NS) flush 5-40 mL  5-40 mL IntraVENous PRN Sai Gipson MD        acetaminophen (TYLENOL) tablet 650 mg  650 mg Oral Q6H PRN Sai Gipson MD        Or    acetaminophen (TYLENOL) suppository 650 mg  650 mg Rectal Q6H PRN Sai Gipson MD        polyethylene glycol [facility]) packet 17 g  17 g Oral DAILY PRN Sai Gipson MD        ondansetron (ZOFRAN ODT) tablet 4 mg  4 mg Oral Q8H PRN Sai Gipson MD        Or    ondansetron [facility]) injection 4 mg  4 mg IntraVENous Q6H PRN Sai Gipson MD        enoxaparin (LOVENOX) injection 40 mg  40 mg SubCUTAneous DAILY Amrita Adrian MD        labetaloL (NORMODYNE;TRANDATE) 20 mg/4 mL (5 mg/mL) injection 10 mg  10 mg IntraVENous Q4H PRN Sai Gipson MD        piperacillin-tazobactam (ZOSYN) 3.375 g in 0.9% sodium chloride (MBP/ADV) 100 mL MBP  3.375 g IntraVENous Amrita Bolanos MD 25 mL/hr at 01/10/22 1240 3.375 g at 01/10/22 1240    HYDROmorphone (DILAUDID) injection 1 mg  1 mg IntraVENous Q4H PRN Sai Gipson MD   1 mg at 01/10/22 1530     Current Outpatient Medications   Medication Sig Dispense Refill    promethazine (PHENERGAN) 25 mg tablet Take 1 Tablet by mouth every six (6) hours as needed for Nausea for up to 3 days. 12 Tablet 0    labetaloL (NORMODYNE) 200 mg tablet Take 2 Tabs by mouth two (2) times a day.  Indications: high blood pressure 120 Tab 1    ibuprofen (MOTRIN) 600 mg tablet Take 1 Tab by mouth every six (6) hours as needed for Pain. 30 Tab 0    NIFEdipine ER (PROCARDIA XL) 30 mg ER tablet Take 1 Tab by mouth daily. Indications: high blood pressure 30 Tab 1    prenatal 96/ARKB fum/folic/dha (PRENATAL-1 PO) Take  by mouth. Review of Systems   All other systems reviewed and are negative. Objective     Vital signs for last 24 hours:  Visit Vitals  BP (!) 136/90   Pulse (!) 111   Temp 98.2 °F (36.8 °C)   Resp 18   Ht 5' 2\" (1.575 m)   Wt 150 lb (68 kg)   SpO2 96%   BMI 27.44 kg/m²       Intake/Output this shift:  Current Shift: No intake/output data recorded. Last 3 Shifts: No intake/output data recorded. Data Review:   Recent Results (from the past 24 hour(s))   CBC WITH AUTOMATED DIFF    Collection Time: 01/10/22  6:30 AM   Result Value Ref Range    WBC 13.3 (H) 3.6 - 11.0 K/uL    RBC 3.88 3.80 - 5.20 M/uL    HGB 11.5 11.5 - 16.0 g/dL    HCT 32.6 (L) 35.0 - 47.0 %    MCV 84.0 80.0 - 99.0 FL    MCH 29.6 26.0 - 34.0 PG    MCHC 35.3 30.0 - 36.5 g/dL    RDW 13.1 11.5 - 14.5 %    PLATELET 420 534 - 840 K/uL    MPV 9.7 8.9 - 12.9 FL    NRBC 0.0 0.0  WBC    ABSOLUTE NRBC 0.00 0.00 - 0.01 K/uL    NEUTROPHILS 86 (H) 32 - 75 %    LYMPHOCYTES 6 (L) 12 - 49 %    MONOCYTES 7 5 - 13 %    EOSINOPHILS 0 0 - 7 %    BASOPHILS 0 0 - 1 %    IMMATURE GRANULOCYTES 1 (H) 0 - 0.5 %    ABS. NEUTROPHILS 11.5 (H) 1.8 - 8.0 K/UL    ABS. LYMPHOCYTES 0.7 (L) 0.8 - 3.5 K/UL    ABS. MONOCYTES 0.9 0.0 - 1.0 K/UL    ABS. EOSINOPHILS 0.0 0.0 - 0.4 K/UL    ABS. BASOPHILS 0.0 0.0 - 0.1 K/UL    ABS. IMM.  GRANS. 0.1 (H) 0.00 - 0.04 K/UL    DF AUTOMATED     METABOLIC PANEL, COMPREHENSIVE    Collection Time: 01/10/22  6:30 AM   Result Value Ref Range    Sodium 136 136 - 145 mmol/L    Potassium 3.4 (L) 3.5 - 5.1 mmol/L    Chloride 106 97 - 108 mmol/L    CO2 22 21 - 32 mmol/L    Anion gap 8 5 - 15 mmol/L    Glucose 137 (H) 65 - 100 mg/dL    BUN 4 (L) 6 - 20 mg/dL    Creatinine 0.53 (L) 0.55 - 1.02 mg/dL    BUN/Creatinine ratio 8 (L) 12 - 20      GFR est AA >60 >60 ml/min/1.73m2    GFR est non-AA >60 >60 ml/min/1.73m2    Calcium 8.0 (L) 8.5 - 10.1 mg/dL    Bilirubin, total 2.1 (H) 0.2 - 1.0 mg/dL    AST (SGOT) 84 (H) 15 - 37 U/L    ALT (SGPT) 31 12 - 78 U/L    Alk. phosphatase 129 (H) 45 - 117 U/L    Protein, total 5.8 (L) 6.4 - 8.2 g/dL    Albumin 2.5 (L) 3.5 - 5.0 g/dL    Globulin 3.3 2.0 - 4.0 g/dL    A-G Ratio 0.8 (L) 1.1 - 2.2     LIPASE    Collection Time: 01/10/22  6:30 AM   Result Value Ref Range    Lipase 48 (L) 73 - 393 U/L   LACTIC ACID    Collection Time: 01/10/22  6:30 AM   Result Value Ref Range    Lactic acid 1.2 0.4 - 2.0 mmol/L   URINALYSIS W/ RFLX MICROSCOPIC    Collection Time: 01/10/22  3:36 PM   Result Value Ref Range    Color Damari      Appearance Clear Clear      Specific gravity 1.029 1.003 - 1.030      pH (UA) 6.0 5.0 - 8.0      Protein 30 (A) Negative mg/dL    Glucose Negative Negative mg/dL    Ketone 80 (A) Negative mg/dL    Bilirubin Small (A) Negative      Blood Negative Negative      Urobilinogen 4.0 (H) 0.1 - 1.0 EU/dL    Nitrites Negative Negative      Leukocyte Esterase Negative Negative      WBC 5-10 0 - 4 /hpf    RBC 0-5 0 - 5 /hpf    Bacteria Negative Negative /hpf    Mucus 1+ /lpf   URINE MICROSCOPIC    Collection Time: 01/10/22  3:36 PM   Result Value Ref Range    WBC 5-10 0 - 4 /hpf    RBC 0-5 0 - 5 /hpf    Bacteria Negative Negative /hpf    Mucus 1+ (A) Negative /lpf       Physical Exam  Constitutional:       Appearance: Normal appearance. She is normal weight. She is not ill-appearing. HENT:      Head: Normocephalic and atraumatic. Cardiovascular:      Rate and Rhythm: Normal rate and regular rhythm. Pulmonary:      Breath sounds: Normal breath sounds. Abdominal:      General: There is no distension. Palpations: Abdomen is soft. There is no mass. Tenderness:  There is abdominal tenderness (Very mildly tender palpation of the epigastrium). Hernia: No hernia is present. Musculoskeletal:      Cervical back: Normal range of motion. Skin:     General: Skin is warm and dry. Neurological:      General: No focal deficit present. Mental Status: She is alert and oriented to person, place, and time. Psychiatric:         Mood and Affect: Mood normal.         Thought Content: Thought content normal.         Judgment: Judgment normal.         Assessment and plan:  40-year-old female 13 weeks gestation presents with right upper quadrant abdominal pain and choledocholithiasis. Elevated bilirubin alkaline phosphatase. Given the fact she is first trimester soon surgery is ill-advised because of the ongoing organogenesis. Hopefully she will be able to pass her stone and her LFTs will start normalizing. Gastroenterology likewise does not feel ERCP is recommended because of radiation. We will continue to follow closely. If LFTs remain high or higher the patient will require to have an ERCP for clearance of her duct.

## 2022-01-11 NOTE — PROGRESS NOTES
Reason for Admission:  Symptomatic Cholelithiasis                     RUR Score: 6%                    Plan for utilizing home health:  Declines at this time        PCP: First and Last name:  Raif Betancur NP     Name of Practice:    Are you a current patient: Yes/No:    Approximate date of last visit: Unsure of last visit   Can you participate in a virtual visit with your PCP:                     Current Advanced Directive/Advance Care Plan: Full Code      Healthcare Decision Maker:   Click here to complete [address] including selection of the Healthcare Decision Maker Relationship (ie \"Primary\")           Pritesh Hence, [phone]                  Transition of Care Plan:                    Patient currently lives at home with her  and children. There are 3 steps to enter the home. Patient has no DME and has never had HH, IRF or SNF services. Patient's  will be her ride home at discharge and she can transport herself to follow up appointments. Patient uses the [facility] in [place], [place]. Current Dispo: Home/self.

## 2022-01-11 NOTE — ANESTHESIA PREPROCEDURE EVALUATION
Relevant Problems   CARDIOVASCULAR   (+) Gestational hypertension   (+) Preeclampsia       Anesthetic History   No history of anesthetic complications            Review of Systems / Medical History  Patient summary reviewed, nursing notes reviewed and pertinent labs reviewed    Pulmonary  Within defined limits                 Neuro/Psych         Psychiatric history     Cardiovascular    Hypertension (pre-E with all pregnancies)              Exercise tolerance: >4 METS     GI/Hepatic/Renal         Renal disease: stones       Endo/Other        Anemia     Other Findings   Comments: PTSD  Pre-eclampsia  Bipolar    13 weeks pregnant           Physical Exam    Airway  Mallampati: I    Neck ROM: normal range of motion   Mouth opening: Normal     Cardiovascular  Regular rate and rhythm,  S1 and S2 normal,  no murmur, click, rub, or gallop  Rhythm: regular  Rate: normal         Dental  No notable dental hx       Pulmonary  Breath sounds clear to auscultation               Abdominal  GI exam deferred       Other Findings            Anesthetic Plan    ASA: 3  Anesthesia type: general          Induction: Intravenous  Anesthetic plan and risks discussed with: Patient      Pt is 13 wks pregnant with likely pre-E. NPO, but treat as full stomach. Minimize pharmaceuticals.

## 2022-01-11 NOTE — PROGRESS NOTES
Received pt from ED, alert and oriented, VS stable, complaining of RUQ abdominal pain 4/10. Skin assessment completed with Rm Mireles RN. Skin is intact, no issues identified.

## 2022-01-11 NOTE — ROUTINE PROCESS
TRANSFER - OUT REPORT:    Verbal report given to kimberly(name) on Melodie Bryan  being transferred to [facility](unit) for routine progression of care       Report consisted of patients Situation, Background, Assessment and   Recommendations(SBAR). Information from the following report(s) SBAR was reviewed with the receiving nurse. Lines:   Peripheral IV 01/10/22 Right Antecubital (Active)   Site Assessment Clean, dry, & intact 01/10/22 0610   Phlebitis Assessment 0 01/10/22 0610   Infiltration Assessment 0 01/10/22 0610   Dressing Status Clean, dry, & intact 01/10/22 0610   Dressing Type Transparent 01/10/22 0610   Hub Color/Line Status Blue;Flushed;Patent 01/10/22 4568        Opportunity for questions and clarification was provided.       Patient transported with:   [employer]

## 2022-01-11 NOTE — PROGRESS NOTES
Problem: Pain  Goal: *Control of Pain  Outcome: Progressing Towards Goal     Problem: Impaired Skin Integrity/Pressure Injury Treatment  Goal: *Prevention of pressure injury  Description: Document Noah Scale and appropriate interventions in the flowsheet. Outcome: Progressing Towards Goal  Note: Pressure Injury Interventions:   Moisture Interventions: Apply protective barrier, creams and emollients     Nutrition Interventions: Document food/fluid/supplement intake     Problem: Falls - Risk of  Goal: *Absence of Falls  Description: Document Bill Fall Risk and appropriate interventions in the flowsheet.   Outcome: Progressing Towards Goal  Note: Fall Risk Interventions:     Medication Interventions: Patient to call before getting OOB

## 2022-01-12 LAB
ALBUMIN SERPL-MCNC: 1.9 G/DL (ref 3.5–5)
ALBUMIN/GLOB SERPL: 0.5 (ref 1.1–2.2)
ALP SERPL-CCNC: 137 U/L (ref 45–117)
ALT SERPL-CCNC: 17 U/L (ref 12–78)
ANION GAP SERPL CALC-SCNC: 10 MMOL/L (ref 5–15)
AST SERPL W P-5'-P-CCNC: 18 U/L (ref 15–37)
ATRIAL RATE: 74 BPM
BILIRUB SERPL-MCNC: 1.8 MG/DL (ref 0.2–1)
BUN SERPL-MCNC: 6 MG/DL (ref 6–20)
BUN/CREAT SERPL: 15 (ref 12–20)
CA-I BLD-MCNC: 8.3 MG/DL (ref 8.5–10.1)
CALCULATED P AXIS, ECG09: 47 DEGREES
CALCULATED R AXIS, ECG10: 46 DEGREES
CALCULATED T AXIS, ECG11: 38 DEGREES
CHLORIDE SERPL-SCNC: 106 MMOL/L (ref 97–108)
CO2 SERPL-SCNC: 19 MMOL/L (ref 21–32)
CREAT SERPL-MCNC: 0.41 MG/DL (ref 0.55–1.02)
DIAGNOSIS, 93000: NORMAL
ERYTHROCYTE [DISTWIDTH] IN BLOOD BY AUTOMATED COUNT: 13.8 % (ref 11.5–14.5)
GLOBULIN SER CALC-MCNC: 3.9 G/DL (ref 2–4)
GLUCOSE SERPL-MCNC: 84 MG/DL (ref 65–100)
HCT VFR BLD AUTO: 30.3 % (ref 35–47)
HGB BLD-MCNC: 10.2 G/DL (ref 11.5–16)
MCH RBC QN AUTO: 29.4 PG (ref 26–34)
MCHC RBC AUTO-ENTMCNC: 33.7 G/DL (ref 30–36.5)
MCV RBC AUTO: 87.3 FL (ref 80–99)
NRBC # BLD: 0 K/UL (ref 0–0.01)
NRBC BLD-RTO: 0 PER 100 WBC
P-R INTERVAL, ECG05: 114 MS
PLATELET # BLD AUTO: 248 K/UL (ref 150–400)
PMV BLD AUTO: 9.6 FL (ref 8.9–12.9)
POTASSIUM SERPL-SCNC: 3.4 MMOL/L (ref 3.5–5.1)
PROT SERPL-MCNC: 5.8 G/DL (ref 6.4–8.2)
Q-T INTERVAL, ECG07: 394 MS
QRS DURATION, ECG06: 86 MS
QTC CALCULATION (BEZET), ECG08: 437 MS
RBC # BLD AUTO: 3.47 M/UL (ref 3.8–5.2)
SODIUM SERPL-SCNC: 135 MMOL/L (ref 136–145)
VENTRICULAR RATE, ECG03: 74 BPM
WBC # BLD AUTO: 10 K/UL (ref 3.6–11)

## 2022-01-12 PROCEDURE — 74011250637 HC RX REV CODE- 250/637: Performed by: INTERNAL MEDICINE

## 2022-01-12 PROCEDURE — 85027 COMPLETE CBC AUTOMATED: CPT

## 2022-01-12 PROCEDURE — 74011250636 HC RX REV CODE- 250/636: Performed by: INTERNAL MEDICINE

## 2022-01-12 PROCEDURE — 36415 COLL VENOUS BLD VENIPUNCTURE: CPT

## 2022-01-12 PROCEDURE — 80053 COMPREHEN METABOLIC PANEL: CPT

## 2022-01-12 PROCEDURE — 74011000258 HC RX REV CODE- 258: Performed by: INTERNAL MEDICINE

## 2022-01-12 PROCEDURE — 74011250637 HC RX REV CODE- 250/637: Performed by: HOSPITALIST

## 2022-01-12 PROCEDURE — 65270000029 HC RM PRIVATE

## 2022-01-12 RX ORDER — FAMOTIDINE 20 MG/1
20 TABLET, FILM COATED ORAL DAILY
Status: DISCONTINUED | OUTPATIENT
Start: 2022-01-12 | End: 2022-01-13 | Stop reason: HOSPADM

## 2022-01-12 RX ADMIN — ACETAMINOPHEN 650 MG: 325 TABLET ORAL at 20:32

## 2022-01-12 RX ADMIN — PIPERACILLIN SODIUM AND TAZOBACTAM SODIUM 3.38 G: 3; .375 INJECTION, POWDER, LYOPHILIZED, FOR SOLUTION INTRAVENOUS at 12:23

## 2022-01-12 RX ADMIN — PIPERACILLIN SODIUM AND TAZOBACTAM SODIUM 3.38 G: 3; .375 INJECTION, POWDER, LYOPHILIZED, FOR SOLUTION INTRAVENOUS at 20:32

## 2022-01-12 RX ADMIN — PRENATAL VIT W/ FE FUMARATE-FA TAB 27-0.8 MG 1 TABLET: 27-0.8 TAB at 09:56

## 2022-01-12 RX ADMIN — HYDROMORPHONE HYDROCHLORIDE 1 MG: 1 INJECTION, SOLUTION INTRAMUSCULAR; INTRAVENOUS; SUBCUTANEOUS at 03:20

## 2022-01-12 RX ADMIN — LABETALOL HYDROCHLORIDE 400 MG: 200 TABLET, FILM COATED ORAL at 09:56

## 2022-01-12 RX ADMIN — LABETALOL HYDROCHLORIDE 400 MG: 200 TABLET, FILM COATED ORAL at 20:32

## 2022-01-12 RX ADMIN — PIPERACILLIN SODIUM AND TAZOBACTAM SODIUM 3.38 G: 3; .375 INJECTION, POWDER, LYOPHILIZED, FOR SOLUTION INTRAVENOUS at 03:20

## 2022-01-12 RX ADMIN — FAMOTIDINE 20 MG: 20 TABLET ORAL at 12:23

## 2022-01-12 RX ADMIN — NIFEDIPINE 30 MG: 30 TABLET, FILM COATED, EXTENDED RELEASE ORAL at 09:55

## 2022-01-12 NOTE — PROGRESS NOTES
Progress Note    Patient: Ted Dale MRN: [id number]  SSN: [id number]    YOB: 1990  Age: 27 y.o. Sex: female      Admit Date: 1/10/2022    LOS: 2 days     Subjective:     31F, week 15 of gestation, with severe nausea and abdominal pain s.t syptomatic cholelithiasis and choledocholithiasis    S/p ERCP on 1/11: Pus and sludge in the bile duct. Sphincterotomy was done and bile duct was swept with balloon several times to clear the bile duct. Cystic duct did not fill with contrast.     On zosyn D2. She feels a little better, has nausea and epigastric pain. Tolerated regular diet which wasn't low fat    Review of Systems:  Constitutional:  denies weight loss, no fever, no chills, no night sweats  Eye: No recent visual disturbances, no discharge, no double vision  Ear/nose/mouth/throat : No hearing disturbance, no ear pain, no nasal congestion, no sore throat, no trouble swallowing. Respiratory : No trouble breathing, no cough, no shortness of breath, no hemoptysis, no wheezing  Cardiovascular : No chest pain, no palpitation, no racing of heart, no orthopnea, no paroxysmal nocturnal dyspnea, no peripheral edema  Gastrointestinal : ++ nausea, no vomiting, no diarrhea, constipation, +++ heartburn, ++++ abdominal pain  Genitourinary : No dysuria, no hematuria, no increased frequency, incontinence,  Lymphatics : No swollen glands -Neck, axillary, inguinal  Endocrine : No excessive thirst, no polyuria no cold intolerance, no heat intolerance. Immunologic : No hives, urticaria, no seasonal allergies,   Musculoskeletal : Left upper back pain.   No joint swelling, pain, effusion,  no neck pain,   Integumentary : No rash, no pruritus, no ecchymosis  Hematology : No petechiae, No easy bruising,  No tendency to bleed easy  Neurology : Denies change in mental status, no abnormal balance, no headache, no confusion, numbness, tingling,  Psychiatric : No mood swings, no anxiety, depression      Objective:     Vitals:    01/11/22 1435 01/11/22 2122 01/12/22 0323 01/12/22 0952   BP: 104/71 120/68  109/74   Pulse: (!) 119 (!) 128 (!) 108 (!) 114   Resp: 20 18  18   Temp: 97.6 °F (36.4 °C) 99 °F (37.2 °C)  98.2 °F (36.8 °C)   SpO2: 96% 98% 97% 97%   Weight:       Height:            Intake and Output:  Current Shift: No intake/output data recorded. Last three shifts: No intake/output data recorded. Physical Exam:   General:          Patient appears uncomfortable    EENT:              EOMI. Anicteric sclerae. MMM  Resp:               CTA bilaterally, no wheezing or rales. No accessory muscle use  CV:                  Regular  rhythm,  S1 plus S2, no murmurs rubs or gallops no edema  GI:                   Soft, Non distended, appreciable tenderness in right upper quadrant with no appreciable rebound or guarding +Bowel sounds  Neurologic:       Alert and oriented X 3, normal speech,   Psych:   Good insight. Not anxious nor agitated  Skin:                No rashes.   No jaundice      Lab/Data Review:  Recent Results (from the past 24 hour(s))   CBC W/O DIFF    Collection Time: 01/12/22  8:23 AM   Result Value Ref Range    WBC 10.0 3.6 - 11.0 K/uL    RBC 3.47 (L) 3.80 - 5.20 M/uL    HGB 10.2 (L) 11.5 - 16.0 g/dL    HCT 30.3 (L) 35.0 - 47.0 %    MCV 87.3 80.0 - 99.0 FL    MCH 29.4 26.0 - 34.0 PG    MCHC 33.7 30.0 - 36.5 g/dL    RDW 13.8 11.5 - 14.5 %    PLATELET 914 406 - 327 K/uL    MPV 9.6 8.9 - 12.9 FL    NRBC 0.0 0.0  WBC    ABSOLUTE NRBC 0.00 0.00 - 6.32 K/uL   METABOLIC PANEL, COMPREHENSIVE    Collection Time: 01/12/22  8:23 AM   Result Value Ref Range    Sodium 135 (L) 136 - 145 mmol/L    Potassium 3.4 (L) 3.5 - 5.1 mmol/L    Chloride 106 97 - 108 mmol/L    CO2 19 (L) 21 - 32 mmol/L    Anion gap 10 5 - 15 mmol/L    Glucose 84 65 - 100 mg/dL    BUN 6 6 - 20 mg/dL    Creatinine 0.41 (L) 0.55 - 1.02 mg/dL    BUN/Creatinine ratio 15 12 - 20      GFR est AA >60 >60 ml/min/1.73m2    GFR est non-AA >60 >60 ml/min/1.73m2    Calcium 8.3 (L) 8.5 - 10.1 mg/dL    Bilirubin, total 1.8 (H) 0.2 - 1.0 mg/dL    AST (SGOT) 18 15 - 37 U/L    ALT (SGPT) 17 12 - 78 U/L    Alk.  phosphatase 137 (H) 45 - 117 U/L    Protein, total 5.8 (L) 6.4 - 8.2 g/dL    Albumin 1.9 (L) 3.5 - 5.0 g/dL    Globulin 3.9 2.0 - 4.0 g/dL    A-G Ratio 0.5 (L) 1.1 - 2.2           Assessment and plan:      Symptomatic cholelithiasis/choledocholithiasispatient presents with above-mentioned symptomatology and based on patient clinical presentation as well as physical examination in conjunction with radiographic findings consistent with symptomatic cholelithiasis with concerns for choledocholithiasis given evidence of biliary duct dilatation as well as elevated LFTs, patient has been evaluated by gastroenterology as well as general surgery, the complicating factor is pregnancy, patient would normally get an ERCP however want to minimize radiation exposure for fetus  Maintenance IV fluids  Dilaudid as needed for pain relief  N.p.o.  Continue to trend LFTs  Continue Zosyn for antibiotic coverage  Gastroenterology consult appreciated, will continue conservative management given pregnancy status  General surgery consult appreciated, continue to follow recommendations     Hypertensioncontinue home antihypertensive medications     Pregnant at 13 weeks of gestation continue prenatal vitamins  Ultrasound reviewed  OB/GYN consult appreciated, continue to follow     Hypokalemia follow-up repeat potassium to assess for resolution     FENNPO, lactated Ringer's at 125 cc/h, replete potassium and magnesium  Full code  [id number] [facility] [place] once cleared by gastroenterology/general Surgery         Signed By: Olaf Milner MD     January 12, 2022

## 2022-01-12 NOTE — PROGRESS NOTES
CM reviewed chart and spoke to primary physician. Patient will likely be ready for discharge tomorrow, 1/13/22. No CM needs at discharge at this time. CM will continue to follow.

## 2022-01-12 NOTE — PROGRESS NOTES
Progress Note    Patient: Bard Code MRN: [id number]  SSN: [id number]    YOB: 1990  Age: 27 y.o. Sex: female      Admit Date: 1/10/2022    LOS: 2 days     Subjective:   GI is following for choledocolothiasis.      ERCP 1/11 - Pus and sludge in the bile duct. Sphincterotomy was done and bile duct  was swept with balloon several times to clear the bile duct. Cystic duct did not fill with contrast.      Patient seen in room awake and alert. Abdominal pain is much better. Sore RUQ. Denies nausea or vomiting. She is tolerating regular diet. Total bilirubin decreased to 1.8. Normal LFTs.      History of Present Illness: Shellie Burnett is a 27 y. o. female who is seen in consultation for Choledocholithiasis. Patient seen in the ED she is 13 weeks gestation. She presented to the Ed with severe right upper quadrant pain radiating to her back. She does report nausea and vomiting. She states she has been unable to keep food or medications down.  Her pain started yesterday. She denies recent fevers, or chills.  Abdominal Ultrasound shows gallstones and CBD dilatation 0.8 cm, intrahepatic ductal dilatation suspected. She does have elevated LFT's: Total bilirubin 2.1, ALT 31, AST 84, Alk Phosphatase 129, lipase 48.  She has a past medical history significant for kidney stones and gestational hypertension. Her abdomen is soft with tenderness to light palpation.  Possible ERCP, high risk to fetus d/t radiation exposure in first trimester. If symptoms worsen contact surgery to consider cholecystectomy or bile duct drain placement until after first trimester. Repeat LFT's in the am. Keep NPO, continue IV hydration.     Abdominal Ultrasound 1/10/2022:  IMPRESSION  Gallstone.  No pericholecystic inflammatory changes noted.  Common  bile duct dilatation.  Intrahepatic ductal dilatation suspected.       Objective:     Vitals:    01/11/22 2122 01/12/22 0323 01/12/22 0952 01/12/22 1559   BP: 120/68 109/74 107/63   Pulse: (!) 128 (!) 108 (!) 114 (!) 107   Resp: 18  18 18   Temp: 99 °F (37.2 °C)  98.2 °F (36.8 °C) 97.7 °F (36.5 °C)   SpO2: 98% 97% 97% 97%   Weight:       Height:            Intake and Output:  Current Shift: No intake/output data recorded. Last three shifts: No intake/output data recorded. Physical Exam:   Skin:  Extremities and face reveal no rashes. No spann erythema. HEENT: Sclerae anicteric. Extra-occular muscles are intact. No abnormal pigmentation of the lips. The neck is supple. Cardiovascular: Regular rate and rhythm. Respiratory:  Comfortable breathing with no accessory muscle use. GI:  Abdomen nondistended, soft, and nontender. No enlargement of the liver or spleen. No masses palpable. Rectal:  Deferred  Musculoskeletal: Extremities have good range of motion. Neurological:  Gross memory appears intact. Patient is alert and oriented. Psychiatric:  Mood appears appropriate with judgement intact.   Lymphatic:  No visible adenopathy      Lab/Data Review:  Recent Results (from the past 24 hour(s))   CBC W/O DIFF    Collection Time: 01/12/22  8:23 AM   Result Value Ref Range    WBC 10.0 3.6 - 11.0 K/uL    RBC 3.47 (L) 3.80 - 5.20 M/uL    HGB 10.2 (L) 11.5 - 16.0 g/dL    HCT 30.3 (L) 35.0 - 47.0 %    MCV 87.3 80.0 - 99.0 FL    MCH 29.4 26.0 - 34.0 PG    MCHC 33.7 30.0 - 36.5 g/dL    RDW 13.8 11.5 - 14.5 %    PLATELET 762 816 - 165 K/uL    MPV 9.6 8.9 - 12.9 FL    NRBC 0.0 0.0  WBC    ABSOLUTE NRBC 0.00 0.00 - 1.31 K/uL   METABOLIC PANEL, COMPREHENSIVE    Collection Time: 01/12/22  8:23 AM   Result Value Ref Range    Sodium 135 (L) 136 - 145 mmol/L    Potassium 3.4 (L) 3.5 - 5.1 mmol/L    Chloride 106 97 - 108 mmol/L    CO2 19 (L) 21 - 32 mmol/L    Anion gap 10 5 - 15 mmol/L    Glucose 84 65 - 100 mg/dL    BUN 6 6 - 20 mg/dL    Creatinine 0.41 (L) 0.55 - 1.02 mg/dL    BUN/Creatinine ratio 15 12 - 20      GFR est AA >60 >60 ml/min/1.73m2    GFR est non-AA >60 >60 ml/min/1.73m2    Calcium 8.3 (L) 8.5 - 10.1 mg/dL    Bilirubin, total 1.8 (H) 0.2 - 1.0 mg/dL    AST (SGOT) 18 15 - 37 U/L    ALT (SGPT) 17 12 - 78 U/L    Alk. phosphatase 137 (H) 45 - 117 U/L    Protein, total 5.8 (L) 6.4 - 8.2 g/dL    Albumin 1.9 (L) 3.5 - 5.0 g/dL    Globulin 3.9 2.0 - 4.0 g/dL    A-G Ratio 0.5 (L) 1.1 - 2.2                XR FLUOROSCOPY UNDER 60 MINUTES   Final Result      US PREG UTS LTD   Final Result   Single live intrauterine pregnancy      US ABD LTD   Final Result   Gallstone. No pericholecystic inflammatory changes noted. Common   bile duct dilatation. Intrahepatic ductal dilatation suspected. Assessment:     Active Problems:    Symptomatic cholelithiasis (1/10/2022)        Plan:   1. Choledocholithiasis      -ERCP 1/11 - Pus and sludge in the bile duct. Sphincterotomy was done and bile duct was swept with balloon several times to clear the bile duct. Cystic duct did not fill with contrast.       -LFT's in the am      -Continue IV hydration      -Continue IV Zosyn  2. 13 Weeks Gestation       -OB/GYN following. 3. Gallstones       -Surgery consulted    Patient discussed with Dr Hans Serrato and agrees to above impression and plan. Thank you for allowing me to participate in this patients care    Signed By: Charis Curling.  RA Mix     January 12, 2022

## 2022-01-13 VITALS
WEIGHT: 150 LBS | OXYGEN SATURATION: 97 % | SYSTOLIC BLOOD PRESSURE: 116 MMHG | HEIGHT: 62 IN | TEMPERATURE: 97.9 F | RESPIRATION RATE: 18 BRPM | DIASTOLIC BLOOD PRESSURE: 77 MMHG | HEART RATE: 111 BPM | BODY MASS INDEX: 27.6 KG/M2

## 2022-01-13 LAB
ALBUMIN SERPL-MCNC: 1.9 G/DL (ref 3.5–5)
ALBUMIN SERPL-MCNC: 2 G/DL (ref 3.5–5)
ALBUMIN/GLOB SERPL: 0.4 (ref 1.1–2.2)
ALBUMIN/GLOB SERPL: 0.5 (ref 1.1–2.2)
ALP SERPL-CCNC: 145 U/L (ref 45–117)
ALP SERPL-CCNC: 147 U/L (ref 45–117)
ALT SERPL-CCNC: 14 U/L (ref 12–78)
ALT SERPL-CCNC: 14 U/L (ref 12–78)
ANION GAP SERPL CALC-SCNC: 9 MMOL/L (ref 5–15)
AST SERPL W P-5'-P-CCNC: 12 U/L (ref 15–37)
AST SERPL W P-5'-P-CCNC: 13 U/L (ref 15–37)
BILIRUB DIRECT SERPL-MCNC: 0.9 MG/DL (ref 0–0.2)
BILIRUB SERPL-MCNC: 1.2 MG/DL (ref 0.2–1)
BILIRUB SERPL-MCNC: 1.2 MG/DL (ref 0.2–1)
BUN SERPL-MCNC: 5 MG/DL (ref 6–20)
BUN/CREAT SERPL: 8 (ref 12–20)
CA-I BLD-MCNC: 8.5 MG/DL (ref 8.5–10.1)
CHLORIDE SERPL-SCNC: 108 MMOL/L (ref 97–108)
CO2 SERPL-SCNC: 20 MMOL/L (ref 21–32)
CREAT SERPL-MCNC: 0.59 MG/DL (ref 0.55–1.02)
GLOBULIN SER CALC-MCNC: 4.2 G/DL (ref 2–4)
GLOBULIN SER CALC-MCNC: 4.3 G/DL (ref 2–4)
GLUCOSE SERPL-MCNC: 158 MG/DL (ref 65–100)
POTASSIUM SERPL-SCNC: 3.1 MMOL/L (ref 3.5–5.1)
PROT SERPL-MCNC: 6.2 G/DL (ref 6.4–8.2)
PROT SERPL-MCNC: 6.2 G/DL (ref 6.4–8.2)
SODIUM SERPL-SCNC: 137 MMOL/L (ref 136–145)

## 2022-01-13 PROCEDURE — 74011250636 HC RX REV CODE- 250/636: Performed by: INTERNAL MEDICINE

## 2022-01-13 PROCEDURE — 74011250637 HC RX REV CODE- 250/637: Performed by: INTERNAL MEDICINE

## 2022-01-13 PROCEDURE — 80053 COMPREHEN METABOLIC PANEL: CPT

## 2022-01-13 PROCEDURE — 36415 COLL VENOUS BLD VENIPUNCTURE: CPT

## 2022-01-13 PROCEDURE — 99232 SBSQ HOSP IP/OBS MODERATE 35: CPT | Performed by: COLON & RECTAL SURGERY

## 2022-01-13 PROCEDURE — 74011000258 HC RX REV CODE- 258: Performed by: INTERNAL MEDICINE

## 2022-01-13 PROCEDURE — 80076 HEPATIC FUNCTION PANEL: CPT

## 2022-01-13 PROCEDURE — 74011250637 HC RX REV CODE- 250/637: Performed by: HOSPITALIST

## 2022-01-13 RX ORDER — PROMETHAZINE HYDROCHLORIDE 12.5 MG/1
25 TABLET ORAL
Qty: 10 TABLET | Refills: 0 | Status: ON HOLD | OUTPATIENT
Start: 2022-01-13 | End: 2022-03-10

## 2022-01-13 RX ORDER — PROMETHAZINE HYDROCHLORIDE 25 MG/1
25 TABLET ORAL
Qty: 12 TABLET | Refills: 0 | Status: SHIPPED | OUTPATIENT
Start: 2022-01-13 | End: 2022-01-16

## 2022-01-13 RX ORDER — FAMOTIDINE 10 MG/1
10 TABLET ORAL
Qty: 5 TABLET | Refills: 0 | Status: ON HOLD | OUTPATIENT
Start: 2022-01-13 | End: 2022-03-10

## 2022-01-13 RX ORDER — CEPHALEXIN 500 MG/1
500 CAPSULE ORAL 2 TIMES DAILY
Qty: 10 CAPSULE | Refills: 0 | Status: ON HOLD | OUTPATIENT
Start: 2022-01-13 | End: 2022-03-10

## 2022-01-13 RX ORDER — OXYCODONE AND ACETAMINOPHEN 5; 325 MG/1; MG/1
1 TABLET ORAL
Qty: 7 TABLET | Refills: 0 | Status: SHIPPED | OUTPATIENT
Start: 2022-01-13 | End: 2022-01-16

## 2022-01-13 RX ADMIN — PIPERACILLIN SODIUM AND TAZOBACTAM SODIUM 3.38 G: 3; .375 INJECTION, POWDER, LYOPHILIZED, FOR SOLUTION INTRAVENOUS at 11:23

## 2022-01-13 RX ADMIN — PIPERACILLIN SODIUM AND TAZOBACTAM SODIUM 3.38 G: 3; .375 INJECTION, POWDER, LYOPHILIZED, FOR SOLUTION INTRAVENOUS at 03:49

## 2022-01-13 RX ADMIN — FAMOTIDINE 20 MG: 20 TABLET ORAL at 09:35

## 2022-01-13 RX ADMIN — ACETAMINOPHEN 650 MG: 325 TABLET ORAL at 09:35

## 2022-01-13 RX ADMIN — NIFEDIPINE 30 MG: 30 TABLET, FILM COATED, EXTENDED RELEASE ORAL at 09:35

## 2022-01-13 RX ADMIN — LABETALOL HYDROCHLORIDE 400 MG: 200 TABLET, FILM COATED ORAL at 09:35

## 2022-01-13 RX ADMIN — PRENATAL VIT W/ FE FUMARATE-FA TAB 27-0.8 MG 1 TABLET: 27-0.8 TAB at 09:35

## 2022-01-13 NOTE — PROGRESS NOTES
Progress Note    Patient: Adrianne Arvizu MRN: [id number]  SSN: [id number]    YOB: 1990  Age: 27 y.o. Sex: female      Admit Date: 1/10/2022    LOS: 3 days     Subjective:   GI is following for choledocolothiasis.      ERCP 1/11 - Pus and sludge in the bile duct. Sphincterotomy was done and bile duct  was swept with balloon several times to clear the bile duct. Cystic duct did not fill with contrast.      Patient seen in room awake and alert. Abdominal pain is much better. Denies nausea or vomiting. She is tolerating regular diet. Total bilirubin decreased to 1.2. Normal LFTs. She is for discharge today.     History of Present Illness: Shellie Burnett is a 27 y. o. female who is seen in consultation for Choledocholithiasis. Patient seen in the ED she is 13 weeks gestation. She presented to the Ed with severe right upper quadrant pain radiating to her back. She does report nausea and vomiting. She states she has been unable to keep food or medications down.  Her pain started yesterday. She denies recent fevers, or chills.  Abdominal Ultrasound shows gallstones and CBD dilatation 0.8 cm, intrahepatic ductal dilatation suspected. She does have elevated LFT's: Total bilirubin 2.1, ALT 31, AST 84, Alk Phosphatase 129, lipase 48.  She has a past medical history significant for kidney stones and gestational hypertension. Her abdomen is soft with tenderness to light palpation.  Possible ERCP, high risk to fetus d/t radiation exposure in first trimester. If symptoms worsen contact surgery to consider cholecystectomy or bile duct drain placement until after first trimester.  Repeat LFT's in the am. Keep NPO, continue IV hydration.     Abdominal Ultrasound 1/10/2022:  IMPRESSION  Gallstone.  No pericholecystic inflammatory changes noted.  Common  bile duct dilatation.  Intrahepatic ductal dilatation suspected.     Objective:     Vitals:    01/12/22 0438 01/12/22 1559 01/12/22 2030 01/13/22 9533 BP: 109/74 107/63 115/67 116/77   Pulse: (!) 114 (!) 107 (!) 107 (!) 111   Resp: 18 18 18 18   Temp: 98.2 °F (36.8 °C) 97.7 °F (36.5 °C) 98.1 °F (36.7 °C) 97.9 °F (36.6 °C)   SpO2: 97% 97% 98% 97%   Weight:       Height:            Intake and Output:  Current Shift: No intake/output data recorded. Last three shifts: No intake/output data recorded. Physical Exam:   Skin:  Extremities and face reveal no rashes. No spann erythema. HEENT: Sclerae anicteric. Extra-occular muscles are intact. No abnormal pigmentation of the lips. The neck is supple. Cardiovascular: Regular rate and rhythm. Respiratory:  Comfortable breathing with no accessory muscle use. GI:  Abdomen nondistended, soft, and nontender. No enlargement of the liver or spleen. No masses palpable. Rectal:  Deferred  Musculoskeletal: Extremities have good range of motion. Neurological:  Gross memory appears intact. Patient is alert and oriented. Psychiatric:  Mood appears appropriate with judgement intact. Lymphatic:  No visible adenopathy      Lab/Data Review:  Recent Results (from the past 24 hour(s))   METABOLIC PANEL, COMPREHENSIVE    Collection Time: 01/13/22  9:15 AM   Result Value Ref Range    Sodium 137 136 - 145 mmol/L    Potassium 3.1 (L) 3.5 - 5.1 mmol/L    Chloride 108 97 - 108 mmol/L    CO2 20 (L) 21 - 32 mmol/L    Anion gap 9 5 - 15 mmol/L    Glucose 158 (H) 65 - 100 mg/dL    BUN 5 (L) 6 - 20 mg/dL    Creatinine 0.59 0.55 - 1.02 mg/dL    BUN/Creatinine ratio 8 (L) 12 - 20      GFR est AA >60 >60 ml/min/1.73m2    GFR est non-AA >60 >60 ml/min/1.73m2    Calcium 8.5 8.5 - 10.1 mg/dL    Bilirubin, total 1.2 (H) 0.2 - 1.0 mg/dL    AST (SGOT) 12 (L) 15 - 37 U/L    ALT (SGPT) 14 12 - 78 U/L    Alk.  phosphatase 147 (H) 45 - 117 U/L    Protein, total 6.2 (L) 6.4 - 8.2 g/dL    Albumin 1.9 (L) 3.5 - 5.0 g/dL    Globulin 4.3 (H) 2.0 - 4.0 g/dL    A-G Ratio 0.4 (L) 1.1 - 2.2     HEPATIC FUNCTION PANEL    Collection Time: 01/13/22  9:15 AM Result Value Ref Range    Protein, total 6.2 (L) 6.4 - 8.2 g/dL    Albumin 2.0 (L) 3.5 - 5.0 g/dL    Globulin 4.2 (H) 2.0 - 4.0 g/dL    A-G Ratio 0.5 (L) 1.1 - 2.2      Bilirubin, total 1.2 (H) 0.2 - 1.0 mg/dL    Bilirubin, direct 0.9 (H) 0.0 - 0.2 mg/dL    Alk. phosphatase 145 (H) 45 - 117 U/L    AST (SGOT) 13 (L) 15 - 37 U/L    ALT (SGPT) 14 12 - 78 U/L              XR FLUOROSCOPY UNDER 60 MINUTES   Final Result      US PREG UTS LTD   Final Result   Single live intrauterine pregnancy      US ABD LTD   Final Result   Gallstone. No pericholecystic inflammatory changes noted. Common   bile duct dilatation. Intrahepatic ductal dilatation suspected. Assessment:     Active Problems:    Symptomatic cholelithiasis (1/10/2022)        Plan:   1. Choledocholithiasis      -ERCP 1/11 - Pus and sludge in the bile duct. Sphincterotomy was done and bile duct was swept with balloon several times to clear the bile duct. Cystic duct did not fill with contrast.       -LFT's in the am      -Continue IV hydration      -Continue IV Zosyn  2. 13 Weeks Gestation       -OB/GYN following. 3. Gallstones       -Surgery consulted       Clear for discharge. Follow up as outpatient. Patient discussed with Dr Good Castaneda and agrees to above impression and plan. Thank you for allowing me to participate in this patients care    Signed By: Ave Bamberger.  RA Mix     January 13, 2022

## 2022-01-13 NOTE — PROGRESS NOTES
Progress Note    Patient: Frantz Fontana MRN: [id number]  SSN: [id number]    YOB: 1990  Age: 27 y.o. Sex: female      Admit Date: 1/10/2022    LOS: 3 days     Subjective:   Post procedure day 2 status post ERCP with sphincterotomy  Patient seen in bed eating breakfast  Denies any significant abdominal pain    Objective:     Vitals:    01/12/22 0952 01/12/22 1559 01/12/22 2030 01/13/22 0843   BP: 109/74 107/63 115/67 116/77   Pulse: (!) 114 (!) 107 (!) 107 (!) 111   Resp: 18 18 18 18   Temp: 98.2 °F (36.8 °C) 97.7 °F (36.5 °C) 98.1 °F (36.7 °C) 97.9 °F (36.6 °C)   SpO2: 97% 97% 98% 97%   Weight:       Height:            Intake and Output:  Current Shift: No intake/output data recorded. Last three shifts: No intake/output data recorded. Review of Systems:  ROS     Physical Exam:   Abdomen is soft, nontender, nondistended    Lab/Data Review:  No results found for this or any previous visit (from the past 24 hour(s)). A.m. labs pending    Assessment:     Active Problems:    Symptomatic cholelithiasis (1/10/2022)        Plan:   A.m. labs pending, yesterday's blood work showed LFTs improving. Patient may be discharged home from surgery perspective. Patient can follow-up with me in 4 to 6 weeks.     Signed By: Jessica Huang MD     January 13, 2022

## 2022-01-13 NOTE — DISCHARGE SUMMARY
Physician Discharge Summary     Patient ID:    Noah Waterman  [id number]  54 y.o.  1990    Admit date: 1/10/2022    Discharge date : 1/13/2022    Chronic Diagnoses:    Problem List as of 1/13/2022 Date Reviewed: 7/15/2019          Codes Class Noted - Resolved    Symptomatic cholelithiasis ICD-10-CM: K80.20  ICD-9-CM: 574.20  1/10/2022 - Present        Preeclampsia ICD-10-CM: O14.90  ICD-9-CM: 642.40  6/11/2020 - Present        Gestational hypertension ICD-10-CM: O13.9  ICD-9-CM: 642.30  6/9/2020 - Present        Pregnant and not yet delivered in third trimester ICD-10-CM: Z34.93  ICD-9-CM: V22.1  6/9/2020 - Present        SARAH II (cervical intraepithelial neoplasia II) ICD-10-CM: N87.1  ICD-9-CM: 622.12  6/12/2019 - Present          22    Final Diagnoses:   Symptomatic cholelithiasis [K80.20]    Reason for Hospitalization:  Choledocholithiasis  HTN        Hospital Course:     31F, week 15 of gestation, with severe nausea and abdominal pain s.t syptomatic cholelithiasis and choledocholithiasis     S/p ERCP on 1/11: Pus and sludge in the bile duct. Sphincterotomy was done and bile duct was swept with balloon several times to clear the bile duct. Cystic duct did not fill with contrast.     Surgery and gastroenterology were consulte    She did tolerated diet, and improved nausea and abdominal pain    INSTRUCTIONS ON DISCHARGE     (1) please take the medication as prescribed:             KEFLEX 500mg twice a day for 5 days     Please take famotidine as needed for heart burn  Please take phenergan for nausea  A short supply of pain medication as been prescribed for you            Discharge Medications:   Current Discharge Medication List      START taking these medications    Details   cephALEXin (Keflex) 500 mg capsule Take 1 Capsule by mouth two (2) times a day.   Qty: 10 Capsule, Refills: 0  Start date: 1/13/2022      famotidine (PEPCID) 10 mg tablet Take 1 Tablet by mouth daily as needed for Heartburn. Qty: 5 Tablet, Refills: 0  Start date: 1/13/2022      oxyCODONE-acetaminophen (Percocet) 5-325 mg per tablet Take 1 Tablet by mouth every eight (8) hours as needed for Pain for up to 3 days. Max Daily Amount: 3 Tablets. Qty: 7 Tablet, Refills: 0  Start date: 1/13/2022, End date: 1/16/2022    Associated Diagnoses: Symptomatic cholelithiasis         CONTINUE these medications which have CHANGED    Details   ! ! promethazine (PHENERGAN) 25 mg tablet Take 1 Tablet by mouth every six (6) hours as needed for Nausea for up to 3 days. Qty: 12 Tablet, Refills: 0  Start date: 1/13/2022, End date: 1/16/2022      !! promethazine (PHENERGAN) 12.5 mg tablet Take 2 Tablets by mouth every six (6) hours as needed for Nausea. Qty: 10 Tablet, Refills: 0  Start date: 1/13/2022       !! - Potential duplicate medications found. Please discuss with provider. CONTINUE these medications which have NOT CHANGED    Details   prenatal 71/RVQI fum/folic/dha (PRENATAL-1 PO) Take  by mouth. labetaloL (NORMODYNE) 200 mg tablet Take 2 Tabs by mouth two (2) times a day. Indications: high blood pressure  Qty: 120 Tab, Refills: 1      NIFEdipine ER (PROCARDIA XL) 30 mg ER tablet Take 1 Tab by mouth daily. Indications: high blood pressure  Qty: 30 Tab, Refills: 1         STOP taking these medications       ibuprofen (MOTRIN) 600 mg tablet Comments:   Reason for Stopping: Follow up Care:    1. Cherie Davis NP in 1-2 weeks. Please call to set up an appointment shortly after discharge. Diet: low fat diet    Disposition:  home    Advanced Directive:   FULL x   DNR      Discharge Exam:    General:          Patient appears comfortable   EENT:              EOMI. Anicteric sclerae.  MMM  Resp:               CTA bilaterally, no wheezing or rales.  No accessory muscle use  CV:                  Regular  rhythm,  S1 plus S2, no murmurs rubs or gallops no edema  GI:                   Soft, Non distended, appreciable tenderness in right upper quadrant with no appreciable rebound or guarding +Bowel sounds  Neurologic:       Alert and oriented X 3, normal speech,   Psych:   Good insight. Not anxious nor agitated  Skin:                No rashes.  No jaundice     CONSULTATIONS: home    Significant Diagnostic Studies:     Radiologic Studies -   Results from Hospital Encounter encounter on 01/10/22    XR FLUOROSCOPY UNDER 60 MINUTES    Narrative  Intraoperative fluoroscopy. Intraoperative fluoroscopy provided. This report is for fluoroscopy time only. Real-time interpretation by operating physician.     Fluoroscopy time- 17 sec     CT Results  (Last 48 hours)    None              Discharge time spent 35 minutes    Signed:  Spike Edge MD  1/13/2022  9:51 AM

## 2022-01-13 NOTE — PROGRESS NOTES
CM noted discharge order. Patient has no CM needs at discharge. Discharge plan of care/case management plan validated with provider discharge order.

## 2022-01-13 NOTE — DISCHARGE INSTRUCTIONS
INSTRUCTIONS ON DISCHARGE     (1) please take the medication as prescribed:             KEFLEX 500mg twice a day for 5 days     Please take famotidine as needed for heart burn  Please take phenergan for nausea  A short supply of pain medication as been prescribed for you

## 2022-01-24 ENCOUNTER — DOCUMENTATION ONLY (OUTPATIENT)
Dept: FAMILY MEDICINE CLINIC | Age: 32
End: 2022-01-24

## 2022-02-26 ENCOUNTER — HOSPITAL ENCOUNTER (EMERGENCY)
Age: 32
Discharge: HOME | End: 2022-02-26
Attending: OBSTETRICS & GYNECOLOGY | Admitting: OBSTETRICS & GYNECOLOGY
Payer: MEDICAID

## 2022-02-26 VITALS
RESPIRATION RATE: 18 BRPM | BODY MASS INDEX: 27.6 KG/M2 | OXYGEN SATURATION: 99 % | TEMPERATURE: 98.3 F | HEART RATE: 112 BPM | DIASTOLIC BLOOD PRESSURE: 74 MMHG | HEIGHT: 62 IN | WEIGHT: 150 LBS | SYSTOLIC BLOOD PRESSURE: 116 MMHG

## 2022-02-26 LAB
ALBUMIN SERPL-MCNC: 2.7 G/DL (ref 3.5–5)
ALBUMIN/GLOB SERPL: 0.7 {RATIO} (ref 1.1–2.2)
ALP SERPL-CCNC: 118 U/L (ref 45–117)
ALT SERPL-CCNC: 14 U/L (ref 12–78)
ANION GAP SERPL CALC-SCNC: 7 MMOL/L (ref 5–15)
AST SERPL-CCNC: 16 U/L (ref 15–37)
BASOPHILS # BLD: 0 K/UL (ref 0–0.1)
BASOPHILS NFR BLD: 0 % (ref 0–1)
BILIRUB SERPL-MCNC: 0.4 MG/DL (ref 0.2–1)
BUN SERPL-MCNC: 7 MG/DL (ref 6–20)
BUN/CREAT SERPL: 16 (ref 12–20)
CALCIUM SERPL-MCNC: 8.3 MG/DL (ref 8.5–10.1)
CHLORIDE SERPL-SCNC: 109 MMOL/L (ref 97–108)
CO2 SERPL-SCNC: 21 MMOL/L (ref 21–32)
CREAT SERPL-MCNC: 0.44 MG/DL (ref 0.55–1.02)
DIFFERENTIAL METHOD BLD: ABNORMAL
EOSINOPHIL # BLD: 0 K/UL (ref 0–0.4)
EOSINOPHIL NFR BLD: 0 % (ref 0–7)
ERYTHROCYTE [DISTWIDTH] IN BLOOD BY AUTOMATED COUNT: 14.5 % (ref 11.5–14.5)
GLOBULIN SER CALC-MCNC: 3.7 G/DL (ref 2–4)
GLUCOSE SERPL-MCNC: 91 MG/DL (ref 65–100)
HCT VFR BLD AUTO: 33.3 % (ref 35–47)
HGB BLD-MCNC: 11.1 G/DL (ref 11.5–16)
IMM GRANULOCYTES # BLD AUTO: 0.1 K/UL (ref 0–0.04)
IMM GRANULOCYTES NFR BLD AUTO: 1 % (ref 0–0.5)
LYMPHOCYTES # BLD: 0.9 K/UL (ref 0.8–3.5)
LYMPHOCYTES NFR BLD: 7 % (ref 12–49)
MCH RBC QN AUTO: 29.4 PG (ref 26–34)
MCHC RBC AUTO-ENTMCNC: 33.3 G/DL (ref 30–36.5)
MCV RBC AUTO: 88.1 FL (ref 80–99)
MONOCYTES # BLD: 0.5 K/UL (ref 0–1)
MONOCYTES NFR BLD: 3 % (ref 5–13)
NEUTS SEG # BLD: 12.4 K/UL (ref 1.8–8)
NEUTS SEG NFR BLD: 89 % (ref 32–75)
NRBC # BLD: 0 K/UL (ref 0–0.01)
NRBC BLD-RTO: 0 PER 100 WBC
PLATELET # BLD AUTO: 250 K/UL (ref 150–400)
PMV BLD AUTO: 9.3 FL (ref 8.9–12.9)
POTASSIUM SERPL-SCNC: 4.5 MMOL/L (ref 3.5–5.1)
PROT SERPL-MCNC: 6.4 G/DL (ref 6.4–8.2)
RBC # BLD AUTO: 3.78 M/UL (ref 3.8–5.2)
SODIUM SERPL-SCNC: 137 MMOL/L (ref 136–145)
WBC # BLD AUTO: 14 K/UL (ref 3.6–11)

## 2022-02-26 PROCEDURE — 36415 COLL VENOUS BLD VENIPUNCTURE: CPT

## 2022-02-26 PROCEDURE — 74011250637 HC RX REV CODE- 250/637: Performed by: OBSTETRICS & GYNECOLOGY

## 2022-02-26 PROCEDURE — 96360 HYDRATION IV INFUSION INIT: CPT

## 2022-02-26 PROCEDURE — 75810000275 HC EMERGENCY DEPT VISIT NO LEVEL OF CARE

## 2022-02-26 PROCEDURE — 74011250636 HC RX REV CODE- 250/636: Performed by: OBSTETRICS & GYNECOLOGY

## 2022-02-26 PROCEDURE — 85025 COMPLETE CBC W/AUTO DIFF WBC: CPT

## 2022-02-26 PROCEDURE — 80053 COMPREHEN METABOLIC PANEL: CPT

## 2022-02-26 PROCEDURE — 99283 EMERGENCY DEPT VISIT LOW MDM: CPT

## 2022-02-26 RX ORDER — ACETAMINOPHEN 500 MG
1000 TABLET ORAL ONCE
Status: COMPLETED | OUTPATIENT
Start: 2022-02-26 | End: 2022-02-26

## 2022-02-26 RX ADMIN — SODIUM CHLORIDE, POTASSIUM CHLORIDE, SODIUM LACTATE AND CALCIUM CHLORIDE 1000 ML: 600; 310; 30; 20 INJECTION, SOLUTION INTRAVENOUS at 11:03

## 2022-02-26 RX ADMIN — ACETAMINOPHEN 1000 MG: 500 TABLET ORAL at 11:10

## 2022-02-26 NOTE — DISCHARGE INSTRUCTIONS
Patient Education        Learning About When to Call Your Doctor During Pregnancy (After 20 Weeks)  Overview  It's common to have concerns about what might be a problem when you're pregnant. Most pregnancies don't have any serious problems. But it's still important to know when to call your doctor if you have certain symptoms or signs of labor. These are general suggestions. Your doctor may give you some more information about when to call. When to call your doctor (after 20 weeks)  Call 911  anytime you think you may need emergency care. For example, call if:  · You have severe vaginal bleeding. · You have sudden, severe pain in your belly. · You passed out (lost consciousness). · You have a seizure. · You see or feel the umbilical cord. · You think you are about to deliver your baby and can't make it safely to the hospital.  Call your doctor now or seek immediate medical care if:  · You have vaginal bleeding. · You have belly pain. · You have a fever. · You have symptoms of preeclampsia, such as:  ? Sudden swelling of your face, hands, or feet. ? New vision problems (such as dimness, blurring, or seeing spots). ? A severe headache. · You have a sudden release of fluid from your vagina. (You think your water broke.)  · You think that you may be in labor. This means that you've had at least 6 contractions in an hour. · You notice that your baby has stopped moving or is moving much less than normal.  · You have symptoms of a urinary tract infection. These may include:  ? Pain or burning when you urinate. ? A frequent need to urinate without being able to pass much urine. ? Pain in the flank, which is just below the rib cage and above the waist on either side of the back. ? Blood in your urine. Watch closely for changes in your health, and be sure to contact your doctor if:  · You have vaginal discharge that smells bad. · You have skin changes, such as:  ? A rash. ? Itching.   ? Yellow color to your skin.  · You have other concerns about your pregnancy. If you have labor signs at 37 weeks or more  If you have signs of labor at 37 weeks or more, your doctor may tell you to call when your labor becomes more active. Symptoms of active labor include:  · Contractions that are regular. · Contractions that are less than 5 minutes apart. · Contractions that are hard to talk through. Follow-up care is a key part of your treatment and safety. Be sure to make and go to all appointments, and call your doctor if you are having problems. It's also a good idea to know your test results and keep a list of the medicines you take. Where can you learn more? Go to http://www.gray.com/  Enter N531 in the search box to learn more about \"Learning About When to Call Your Doctor During Pregnancy (After 20 Weeks). \"  Current as of: 2021               Content Version: 13.0  © 5488-2691 [employer]. Care instructions adapted under license by [employer] (which disclaims liability or warranty for this information). If you have questions about a medical condition or this instruction, always ask your healthcare professional. [place] [address] any warranty or liability for your use of this information. Patient Education        Pregnancy Precautions: Care Instructions  Your Care Instructions     There is no sure way to prevent labor before your due date ( labor) or to prevent most other pregnancy problems. But there are things you can do to increase your chances of a healthy pregnancy. Go to your appointments, follow your doctor's advice, and take good care of yourself. Eat well, and exercise (if your doctor agrees). And make sure to drink plenty of water. Follow-up care is a key part of your treatment and safety. Be sure to make and go to all appointments, and call your doctor if you are having problems.  It's also a good idea to know your test results and keep a list of the medicines you take. How can you care for yourself at home? · Make sure you go to your prenatal appointments. At each visit, your doctor will check your blood pressure. Your doctor will also check to see if you have protein in your urine. High blood pressure and protein in urine are signs of preeclampsia. This condition can be dangerous for you and your baby. · Drink plenty of fluids. Dehydration can cause contractions. If you have kidney, heart, or liver disease and have to limit fluids, talk with your doctor before you increase the amount of fluids you drink. · Tell your doctor right away if you notice any symptoms of an infection, such as:  ? Burning when you urinate. ? A foul-smelling discharge from your vagina. ? Vaginal itching. ? Unexplained fever. ? Unusual pain or soreness in your uterus or lower belly. · Eat a balanced diet. Include plenty of foods that are high in calcium and iron. ? Foods high in calcium include milk, cheese, yogurt, almonds, and broccoli. ? Foods high in iron include red meat, shellfish, poultry, eggs, beans, raisins, whole-grain bread, and leafy green vegetables. · Do not smoke. If you need help quitting, talk to your doctor about stop-smoking programs and medicines. These can increase your chances of quitting for good. · Do not drink alcohol or use marijuana or illegal drugs. · Follow your doctor's directions about activity. Your doctor will let you know how much, if any, exercise you can do. · Ask your doctor if you can have sex. If you are at risk for early labor, your doctor may ask you to not have sex. · Take care to prevent falls. During pregnancy, your joints are loose, and your balance is off. Sports such as bicycling, skiing, or in-line skating can increase your risk of falling. And don't ride horses or motorcycles, dive, water ski, scuba dive, or parachute jump while you are pregnant. · Avoid getting very hot.  Do not use saunas or hot tubs. Avoid staying out in the sun in hot weather for long periods. Take acetaminophen (Tylenol) to lower a high fever. · Do not take any over-the-counter or herbal medicines or supplements without talking to your doctor or pharmacist first.  When should you call for help? Call 911  anytime you think you may need emergency care. For example, call if:    · You passed out (lost consciousness).     · You have a seizure.     · You have severe vaginal bleeding.     · You have severe pain in your belly or pelvis.     · You have had fluid gushing or leaking from your vagina and you know or think the umbilical cord is bulging into your vagina. If this happens, immediately get down on your knees so your rear end (buttocks) is higher than your head. This will decrease the pressure on the cord until help arrives. Call your doctor now or seek immediate medical care if:    · You have signs of preeclampsia, such as:  ? Sudden swelling of your face, hands, or feet. ? New vision problems (such as dimness, blurring, or seeing spots). ? A severe headache.     · You have any vaginal bleeding.     · You have belly pain or cramping.     · You have a fever.     · You have had regular contractions (with or without pain) for an hour. This means that you have 8 or more within 1 hour or 4 or more in 20 minutes after you change your position and drink fluids.     · You have a sudden release of fluid from your vagina.     · You have low back pain or pelvic pressure that does not go away.     · You notice that your baby has stopped moving or is moving much less than normal.   Watch closely for changes in your health, and be sure to contact your doctor if you have any problems. Where can you learn more? Go to http://www.mo.com/  Enter Y951 in the search box to learn more about \"Pregnancy Precautions: Care Instructions. \"  Current as of: June 16, 2021               Content Version: 13.0  © 1956-5859 [employer]. Care instructions adapted under license by [employer] (which disclaims liability or warranty for this information). If you have questions about a medical condition or this instruction, always ask your healthcare professional. [address] any warranty or liability for your use of this information. Patient Education        Muscle Strain: Care Instructions  Your Care Instructions     A muscle strain happens when you overstretch, or pull, a muscle. It can happen when you exercise or lift something or when you have an accident. Rest and other home care can help the muscle heal.  Follow-up care is a key part of your treatment and safety. Be sure to make and go to all appointments, and call your doctor if you are having problems. It's also a good idea to know your test results and keep a list of the medicines you take. How can you care for yourself at home? · Rest the strained muscle. Do not put weight on it for a day or two. If your doctor advises you to, use crutches or a sling to rest a sore limb. · Put ice or a cold pack on the sore muscle for 10 to 20 minutes at a time to stop swelling. Put a thin cloth between the ice pack and your skin. · Prop up the sore arm or leg on a pillow when you ice it or anytime you sit or lie down during the next 3 days. Try to keep it above the level of your heart. This will help reduce swelling. · Take pain medicines exactly as directed. ? If the doctor gave you a prescription medicine for pain, take it as prescribed. ? If you are not taking a prescription pain medicine, ask your doctor if you can take an over-the-counter medicine. · Do not do anything that makes the pain worse. Return to exercise gradually as you feel better. When should you call for help?    Call your doctor now or seek immediate medical care if:    · You have new severe pain.     · Your injured limb is cool or pale or changes color.     · You have tingling, weakness, or numbness in your injured limb.     · You cannot move the injured area. Watch closely for changes in your health, and be sure to contact your doctor if:    · You cannot put weight on a joint, or it feels unsteady when you walk.     · Pain and swelling get worse or do not start to get better after 2 days of home treatment. Where can you learn more? Go to http://www.gray.com/  Enter V670 in the search box to learn more about \"Muscle Strain: Care Instructions. \"  Current as of: July 1, 2021               Content Version: 13.0  © 5921-5503 [employer]. Care instructions adapted under license by [employer] (which disclaims liability or warranty for this information). If you have questions about a medical condition or this instruction, always ask your healthcare professional. [address] any warranty or liability for your use of this information.

## 2022-02-26 NOTE — ED TRIAGE NOTES
Pt arrives to ED with complaints of right side abdominal pain that started after vomiting this morning. Pt reports generalized abdominal pain and back pain x3 days. Pt denies diarrhea, CP, SOB. Pt reports she had the episode of vomiting only once due to \"a bad smell\". Pt had appedix removed 12 years ago. Pt has hx of kidney stones. Pt reports she is 20 weeks pregnant with no complications. Pt had preeclampsia with previous pregnancies. Pt denies vaginal bleeding.

## 2022-02-26 NOTE — PROGRESS NOTES
200 Dr Orin Wilkinson at the bedside assessing patient. RN received orders to apply heating pad to affected area, 1g tylenol, 1L IV bolus and draw labs.

## 2022-02-26 NOTE — H&P
History & Physical    Name: Dahlia Vásquez MRN: [id number]  SSN: [id number]    YOB: 1990  Age: 27 y.o. Sex: female        Subjective:     Estimated Date of Delivery: None noted. OB History        14    Para   4    Term   3       1    AB   9    Living   4       SAB   9    IAB        Ectopic        Molar        Multiple   0    Live Births   4                Ms. Kymberly Kirkpatrick is admitted with pregnancy at [facility] for acute onset R sided abdm pain. Her son had a bowel movement this morning and the smell prompted vomitting and then the pain started. She denies VB, LOF, UC. . Prenatal course was normal. No prenatal records reviewed. Past Medical History:   Diagnosis Date    Abnormal Papanicolaou smear of cervix     Anemia     Bipolar disorder ([facility] [address]     Gestational hypertension     Hypertension in pregnancy     pre-E with prior pregnancy    Ill-defined condition 2019    right shoulder injury/pain    Kidney disease     kidney stones    Postpartum depression     PTSD (post-traumatic stress disorder)     Trauma      Past Surgical History:   Procedure Laterality Date    HX APPENDECTOMY      HX  SECTION      HX LEEP PROCEDURE  2019    HX OTHER SURGICAL      HX UROLOGICAL      kidney stones     Social History     Occupational History    Not on file   Tobacco Use    Smoking status: Never Smoker    Smokeless tobacco: Never Used   Substance and Sexual Activity    Alcohol use: Not Currently    Drug use: Never    Sexual activity: Yes     Family History   Problem Relation Age of Onset    Stroke Mother     Hypertension Mother     Hypertension Father     Mult Sclerosis Father        Allergies   Allergen Reactions    Latex Itching and Swelling    Iodine Itching and Swelling    Reglan [Metoclopramide] Other (comments)     Prior to Admission medications    Medication Sig Start Date End Date Taking?  Authorizing Provider   cephALEXin (Keflex) 500 mg capsule Take 1 Capsule by mouth two (2) times a day. 1/13/22   Wiley Alcala MD   famotidine (PEPCID) 10 mg tablet Take 1 Tablet by mouth daily as needed for Heartburn. 1/13/22   Wiley Alcala MD   promethazine (PHENERGAN) 12.5 mg tablet Take 2 Tablets by mouth every six (6) hours as needed for Nausea. 1/13/22   Wiley Alcala MD   labetaloL (NORMODYNE) 200 mg tablet Take 2 Tabs by mouth two (2) times a day. Indications: high blood pressure 6/16/20   Mary Sams MD   NIFEdipine ER (PROCARDIA XL) 30 mg ER tablet Take 1 Tab by mouth daily. Indications: high blood pressure  Patient not taking: Reported on 1/11/2022 6/16/20   Kymberly Mckeon MD   prenatal 18/YDYC fum/folic/dha (PRENATAL-1 PO) Take  by mouth. Provider, Historical        Review of Systems: Pertinent items are noted in HPI. Objective:     Vitals:  Vitals:    02/26/22 0952 02/26/22 1022   BP: 111/74 (P) 116/74   Pulse: (!) 122 (!) (P) 112   Resp: 18 (P) 18   Temp: 97.7 °F (36.5 °C) (P) 98.3 °F (36.8 °C)   SpO2: 98% (P) 99%   Weight: 68 kg (150 lb)    Height: 5' 2\" (1.575 m)         Physical Exam:  General uncomfortable  CVS: RRR no r/mg  Pulmonary: CTAB  Abdm: gravid TTP R mid abdm. Not at uterus  Back: ASHKAN CVA NT, spine NT  ASHKAN LE NT w/o edema  [place]: denies  Membranes:  Intact  Fetal Heart Rate: positive  SVE: deferred  CBC and CMP normal    Prenatal Labs:   Lab Results   Component Value Date/Time    Rubella, External Immune 12/18/2019 12:00 AM    HBsAg, External Negative 12/18/2019 12:00 AM    HIV, External Negative 12/18/2019 12:00 AM    RPR, External non-reactive 12/18/2019 12:00 AM    Gonorrhea, External Negative 12/18/2019 12:00 AM    Chlamydia, External Negative 12/18/2019 12:00 AM        Assessment/Plan:     Plan: Admit for 20wks w/ sudden onset R sided abdm pain. s/p appy in the past. no OB c/o. .  Group B Strep was not tested. She is slightly anemic.        Evaluated initially approx 1030. 1150, labs now back and normal, s/p PO Tylenol. Feeling better, prefers to be at home. +FM. Has office appt this THursday. Labs and home care and precautions reviewed. All ques answered. Seen w/  Nursing staff.  Stable for discharge    Signed By:  Teresa Ricketts MD     February 26, 2022

## 2022-03-10 ENCOUNTER — HOSPITAL ENCOUNTER (INPATIENT)
Age: 32
LOS: 3 days | Discharge: HOME | DRG: 951 | End: 2022-03-14
Attending: STUDENT IN AN ORGANIZED HEALTH CARE EDUCATION/TRAINING PROGRAM | Admitting: STUDENT IN AN ORGANIZED HEALTH CARE EDUCATION/TRAINING PROGRAM
Payer: MEDICAID

## 2022-03-10 ENCOUNTER — APPOINTMENT (OUTPATIENT)
Dept: ULTRASOUND IMAGING | Age: 32
DRG: 951 | End: 2022-03-10
Attending: STUDENT IN AN ORGANIZED HEALTH CARE EDUCATION/TRAINING PROGRAM
Payer: MEDICAID

## 2022-03-10 DIAGNOSIS — K80.20 GALLSTONES: Primary | ICD-10-CM

## 2022-03-10 PROBLEM — Z3A.22 22 WEEKS GESTATION OF PREGNANCY: Status: ACTIVE | Noted: 2022-03-10

## 2022-03-10 PROBLEM — E03.9 HYPOTHYROIDISM: Status: ACTIVE | Noted: 2022-03-10

## 2022-03-10 PROBLEM — R00.0 TACHYCARDIA: Status: ACTIVE | Noted: 2022-03-10

## 2022-03-10 PROBLEM — Z34.90 PREGNANCY: Status: ACTIVE | Noted: 2022-03-10

## 2022-03-10 LAB
ALBUMIN SERPL-MCNC: 2.6 G/DL (ref 3.5–5)
ALBUMIN/GLOB SERPL: 0.6 {RATIO} (ref 1.1–2.2)
ALP SERPL-CCNC: 218 U/L (ref 45–117)
ALT SERPL-CCNC: 25 U/L (ref 12–78)
AMYLASE SERPL-CCNC: 62 U/L (ref 25–115)
ANION GAP SERPL CALC-SCNC: 9 MMOL/L (ref 5–15)
APPEARANCE UR: ABNORMAL
AST SERPL-CCNC: 11 U/L (ref 15–37)
BACTERIA URNS QL MICRO: ABNORMAL /HPF
BASOPHILS # BLD: 0 K/UL (ref 0–0.1)
BASOPHILS NFR BLD: 0 % (ref 0–1)
BILIRUB SERPL-MCNC: 0.4 MG/DL (ref 0.2–1)
BILIRUB UR QL: NEGATIVE
BUN SERPL-MCNC: 5 MG/DL (ref 6–20)
BUN/CREAT SERPL: 9 (ref 12–20)
CALCIUM SERPL-MCNC: 8.6 MG/DL (ref 8.5–10.1)
CHLORIDE SERPL-SCNC: 103 MMOL/L (ref 97–108)
CO2 SERPL-SCNC: 25 MMOL/L (ref 21–32)
COLOR UR: ABNORMAL
CREAT SERPL-MCNC: 0.54 MG/DL (ref 0.55–1.02)
DIFFERENTIAL METHOD BLD: ABNORMAL
EOSINOPHIL # BLD: 0 K/UL (ref 0–0.4)
EOSINOPHIL NFR BLD: 0 % (ref 0–7)
EPITH CASTS URNS QL MICRO: ABNORMAL /LPF
ERYTHROCYTE [DISTWIDTH] IN BLOOD BY AUTOMATED COUNT: 14.1 % (ref 11.5–14.5)
GLOBULIN SER CALC-MCNC: 4.1 G/DL (ref 2–4)
GLUCOSE SERPL-MCNC: 110 MG/DL (ref 65–100)
GLUCOSE UR STRIP.AUTO-MCNC: NEGATIVE MG/DL
HCT VFR BLD AUTO: 32.9 % (ref 35–47)
HGB BLD-MCNC: 11.1 G/DL (ref 11.5–16)
HGB UR QL STRIP: NEGATIVE
IMM GRANULOCYTES # BLD AUTO: 0.2 K/UL (ref 0–0.04)
IMM GRANULOCYTES NFR BLD AUTO: 1 % (ref 0–0.5)
KETONES UR QL STRIP.AUTO: 40 MG/DL
LEUKOCYTE ESTERASE UR QL STRIP.AUTO: ABNORMAL
LIPASE SERPL-CCNC: 97 U/L (ref 73–393)
LYMPHOCYTES # BLD: 0.9 K/UL (ref 0.8–3.5)
LYMPHOCYTES NFR BLD: 6 % (ref 12–49)
MCH RBC QN AUTO: 29.4 PG (ref 26–34)
MCHC RBC AUTO-ENTMCNC: 33.7 G/DL (ref 30–36.5)
MCV RBC AUTO: 87.3 FL (ref 80–99)
MONOCYTES # BLD: 0.5 K/UL (ref 0–1)
MONOCYTES NFR BLD: 3 % (ref 5–13)
NEUTS SEG # BLD: 13.5 K/UL (ref 1.8–8)
NEUTS SEG NFR BLD: 90 % (ref 32–75)
NITRITE UR QL STRIP.AUTO: NEGATIVE
NRBC # BLD: 0 K/UL (ref 0–0.01)
NRBC BLD-RTO: 0 PER 100 WBC
PH UR STRIP: 8 [PH] (ref 5–8)
PLATELET # BLD AUTO: 358 K/UL (ref 150–400)
PMV BLD AUTO: 8.6 FL (ref 8.9–12.9)
POTASSIUM SERPL-SCNC: 4.3 MMOL/L (ref 3.5–5.1)
PROT SERPL-MCNC: 6.7 G/DL (ref 6.4–8.2)
PROT UR STRIP-MCNC: ABNORMAL MG/DL
RBC # BLD AUTO: 3.77 M/UL (ref 3.8–5.2)
RBC #/AREA URNS HPF: ABNORMAL /HPF (ref 0–5)
SODIUM SERPL-SCNC: 137 MMOL/L (ref 136–145)
SP GR UR REFRACTOMETRY: 1.02 (ref 1–1.03)
TSH SERPL DL<=0.05 MIU/L-ACNC: 0.27 UIU/ML (ref 0.36–3.74)
UR CULT HOLD, URHOLD: NORMAL
UROBILINOGEN UR QL STRIP.AUTO: 1 EU/DL (ref 0.2–1)
WBC # BLD AUTO: 15.1 K/UL (ref 3.6–11)
WBC URNS QL MICRO: ABNORMAL /HPF (ref 0–4)

## 2022-03-10 PROCEDURE — G0378 HOSPITAL OBSERVATION PER HR: HCPCS

## 2022-03-10 PROCEDURE — 93005 ELECTROCARDIOGRAM TRACING: CPT

## 2022-03-10 PROCEDURE — 76705 ECHO EXAM OF ABDOMEN: CPT

## 2022-03-10 PROCEDURE — 82150 ASSAY OF AMYLASE: CPT

## 2022-03-10 PROCEDURE — 86921 COMPATIBILITY TEST INCUBATE: CPT

## 2022-03-10 PROCEDURE — 86900 BLOOD TYPING SEROLOGIC ABO: CPT

## 2022-03-10 PROCEDURE — 86922 COMPATIBILITY TEST ANTIGLOB: CPT

## 2022-03-10 PROCEDURE — 74011000250 HC RX REV CODE- 250: Performed by: STUDENT IN AN ORGANIZED HEALTH CARE EDUCATION/TRAINING PROGRAM

## 2022-03-10 PROCEDURE — 81001 URINALYSIS AUTO W/SCOPE: CPT

## 2022-03-10 PROCEDURE — 96361 HYDRATE IV INFUSION ADD-ON: CPT

## 2022-03-10 PROCEDURE — 96374 THER/PROPH/DIAG INJ IV PUSH: CPT

## 2022-03-10 PROCEDURE — 86920 COMPATIBILITY TEST SPIN: CPT

## 2022-03-10 PROCEDURE — 36415 COLL VENOUS BLD VENIPUNCTURE: CPT

## 2022-03-10 PROCEDURE — 59025 FETAL NON-STRESS TEST: CPT

## 2022-03-10 PROCEDURE — 74011250637 HC RX REV CODE- 250/637: Performed by: STUDENT IN AN ORGANIZED HEALTH CARE EDUCATION/TRAINING PROGRAM

## 2022-03-10 PROCEDURE — 74011250636 HC RX REV CODE- 250/636: Performed by: STUDENT IN AN ORGANIZED HEALTH CARE EDUCATION/TRAINING PROGRAM

## 2022-03-10 PROCEDURE — 99285 EMERGENCY DEPT VISIT HI MDM: CPT

## 2022-03-10 PROCEDURE — 96367 TX/PROPH/DG ADDL SEQ IV INF: CPT

## 2022-03-10 PROCEDURE — 83690 ASSAY OF LIPASE: CPT

## 2022-03-10 PROCEDURE — 80053 COMPREHEN METABOLIC PANEL: CPT

## 2022-03-10 PROCEDURE — 84443 ASSAY THYROID STIM HORMONE: CPT

## 2022-03-10 PROCEDURE — 85025 COMPLETE CBC W/AUTO DIFF WBC: CPT

## 2022-03-10 RX ORDER — SODIUM CHLORIDE 0.9 % (FLUSH) 0.9 %
5-40 SYRINGE (ML) INJECTION EVERY 8 HOURS
Status: DISCONTINUED | OUTPATIENT
Start: 2022-03-10 | End: 2022-03-14 | Stop reason: HOSPADM

## 2022-03-10 RX ORDER — SODIUM CHLORIDE 0.9 % (FLUSH) 0.9 %
5-40 SYRINGE (ML) INJECTION AS NEEDED
Status: DISCONTINUED | OUTPATIENT
Start: 2022-03-10 | End: 2022-03-14 | Stop reason: HOSPADM

## 2022-03-10 RX ORDER — FAMOTIDINE 20 MG/1
20 TABLET, FILM COATED ORAL
Status: DISCONTINUED | OUTPATIENT
Start: 2022-03-10 | End: 2022-03-14 | Stop reason: HOSPADM

## 2022-03-10 RX ORDER — ACETAMINOPHEN 500 MG
1000 TABLET ORAL ONCE
Status: COMPLETED | OUTPATIENT
Start: 2022-03-10 | End: 2022-03-10

## 2022-03-10 RX ORDER — ACETAMINOPHEN 500 MG
1000 TABLET ORAL
Status: DISCONTINUED | OUTPATIENT
Start: 2022-03-10 | End: 2022-03-12

## 2022-03-10 RX ORDER — ACETAMINOPHEN 500 MG
500 TABLET ORAL
COMMUNITY

## 2022-03-10 RX ORDER — CYCLOBENZAPRINE HCL 10 MG
5 TABLET ORAL
Status: DISCONTINUED | OUTPATIENT
Start: 2022-03-10 | End: 2022-03-14 | Stop reason: HOSPADM

## 2022-03-10 RX ORDER — ONDANSETRON 4 MG/1
4 TABLET, ORALLY DISINTEGRATING ORAL
Status: DISCONTINUED | OUTPATIENT
Start: 2022-03-10 | End: 2022-03-14 | Stop reason: HOSPADM

## 2022-03-10 RX ORDER — ONDANSETRON 4 MG/1
4 TABLET, FILM COATED ORAL
COMMUNITY
End: 2022-03-22 | Stop reason: DRUGHIGH

## 2022-03-10 RX ORDER — CYCLOBENZAPRINE HCL 10 MG
5 TABLET ORAL ONCE
Status: COMPLETED | OUTPATIENT
Start: 2022-03-10 | End: 2022-03-10

## 2022-03-10 RX ORDER — DIPHENHYDRAMINE HCL 25 MG
25 CAPSULE ORAL
Status: DISCONTINUED | OUTPATIENT
Start: 2022-03-10 | End: 2022-03-14 | Stop reason: HOSPADM

## 2022-03-10 RX ORDER — SODIUM CHLORIDE, SODIUM LACTATE, POTASSIUM CHLORIDE, CALCIUM CHLORIDE 600; 310; 30; 20 MG/100ML; MG/100ML; MG/100ML; MG/100ML
1000 INJECTION, SOLUTION INTRAVENOUS CONTINUOUS
Status: DISCONTINUED | OUTPATIENT
Start: 2022-03-10 | End: 2022-03-14 | Stop reason: HOSPADM

## 2022-03-10 RX ADMIN — SODIUM CHLORIDE, POTASSIUM CHLORIDE, SODIUM LACTATE AND CALCIUM CHLORIDE 1000 ML: 600; 310; 30; 20 INJECTION, SOLUTION INTRAVENOUS at 11:23

## 2022-03-10 RX ADMIN — ACETAMINOPHEN 1000 MG: 500 TABLET ORAL at 12:33

## 2022-03-10 RX ADMIN — SODIUM CHLORIDE, PRESERVATIVE FREE 20 MG: 5 INJECTION INTRAVENOUS at 13:48

## 2022-03-10 RX ADMIN — SODIUM CHLORIDE, POTASSIUM CHLORIDE, SODIUM LACTATE AND CALCIUM CHLORIDE 1000 ML: 600; 310; 30; 20 INJECTION, SOLUTION INTRAVENOUS at 16:04

## 2022-03-10 RX ADMIN — CYCLOBENZAPRINE 5 MG: 10 TABLET, FILM COATED ORAL at 14:46

## 2022-03-10 RX ADMIN — ACETAMINOPHEN 1000 MG: 500 TABLET ORAL at 19:25

## 2022-03-10 RX ADMIN — CYCLOBENZAPRINE 5 MG: 10 TABLET, FILM COATED ORAL at 13:48

## 2022-03-10 RX ADMIN — CYCLOBENZAPRINE 5 MG: 10 TABLET, FILM COATED ORAL at 19:25

## 2022-03-10 NOTE — PROGRESS NOTES
1621: Verbal shift change report given to Bell Garner RN (oncoming nurse) by Jessenia Patrick RN (offgoing nurse). Report included the following information SBAR. 1010: Patient resting in supine position with mild right hip tilt with cc of back pain that began around 0400 today. Patient reports initial \"gall bladder flare up\" around 0100. Pt reports back pain as lower and mid, non-radiating. Patient reports taking OTC tylenol around 0400 without relief along with applying a warm compress without relief. Patient reports feeling intermittent tightening across upper abdomen. Abdomen palpates soft upon initial assessment. Patient denies dysuria. Patient denies vaginal discharge, vaginal bleeding, or LOF. Patient denies recent intercourse. 1117: This RN Robert Hardin MD of patient's HR and resulting CBC and UA results. TORB to administer an LR bolus. No additional orders received. Awaiting CMP results. 1159Leloy Dominguez MD informed via telephone that Jennifer [place] [employer] 112 has resulted. MD to review lab results and call back. No new orders received. 1320: Greg Klein MD at bedside to assess patient. 1458: Verbal shift change report given to Chico Garcia RN (oncoming nurse) by Bell Garner RN (offgoing nurse). Report included the following information SBAR, Intake/Output, MAR and Recent Results.

## 2022-03-10 NOTE — PROGRESS NOTES
3/10/2022  3:51 PM    Observation notice provided in writing to patient and/or caregiver as well as verbal explanation of the policy. Patients who are in outpatient status also receive the Observation notice.     Dustin Marina

## 2022-03-10 NOTE — H&P
Labor Note    Allie Clancy  [id number]  1990   Unknown      26 yo [id number] at 22w presented today w/ severe back pain and some pain central in upper abdomen. No urinary sxs/constipation. + FM, no bleeding/ctx/lof. She went to [facility] in January and had biliary sludge as well as gallstone and had MRCP and 'it all cleared out.' She said tylenol did not help. She was recently diagnosed w/ R sided low back spasm. O:    Visit Vitals  /77 (BP 1 Location: Right upper arm, BP Patient Position: At rest)   Pulse (!) 114   Temp 98.8 °F (37.1 °C)   Resp 16   SpO2 100%     Cervical Exam  Membrane Status: Intact    Gen in mild distress   to palpation throughout lower back  No cva tenderness  Neg murphys sign   No pain on palpation of liver  No hepatomegaly   Uterus nontender    Patient Vitals for the past 4 hrs: Mode Fetal Heart Rate Fetal Activity   03/10/22 1014 External 130 Present       A/P:  27 y.o.  w/ back pain likely MSK refractory to Tylenol and heat  - Try flexeril and pepcid   - CMP, amylase, lipase wnl  - WBC stable  - If improved w/ flexeril and pepcid will dc if not improved will get RUQ sono and poss GI consult       Vidya Beryr MD  [facility]        1800 addendum    Patient is feeling better in terms of back pain when she does not move. When she does not move pain is tolerable 4/10. When she moves it is 9/10. The pain in her RUQ/midline abd is better.      RUQ sono w/ gallstones  TSH s/f hyperthyroidism, explains tachycardia     Will consult GI and Endocrine in the morning    Admit for pain control Flexeril and Tylenol overnight    Fetal doptones BID     Vidya Berry MD  [facility]

## 2022-03-10 NOTE — PROGRESS NOTES
1458: Bedside and Verbal shift change report given to ROBERTO Storey RN (oncoming nurse) by Shoshana Brown RN (offgoing nurse). Report included the following information SBAR, Intake/Output, MAR, Accordion, Recent Results and Med Rec Status. 1525: This RN reassess pain post flexeril. Patient reports 4 out of 10 when not moving and 6 out of 10 when moving. Patient remains tachcardic with HR in 120's. TORB for  EKG, TSH, x1 Liter LR, and US of RUQ.     1657: Patient off floor for US. 1730: Patient returned to room from [address] [place] [address]: [facility] to admit patient and FHT once per shift. 1900: Bedside and Verbal shift change report given to B. Ricard Brittle (oncoming nurse) by Josie Dougherty RN (offgoing nurse). Report included the following information SBAR, Kardex, Intake/Output, MAR, Accordion, Recent Results and Med Rec Status.

## 2022-03-10 NOTE — PROGRESS NOTES
1362 Pt entered room with  Maya Dow. Pt stated she had a gallbladder attack at 0100 and vomited and around 0300 she started having back pain. Pt rates her pain an 8 out of 10. Pt declines HA, nausea, vomiting, vaginal bleeding or discharge. Pt stated she was here last week for pain in her right side and was dx w/ a pulled muscle. Pt stated she had an EGD performed early on in pregnancy which showed an inflamed gallbladder. 5690 This RN phoned Dannielle Jones MD. Waiting for call back. [address] [place] [address],[address] given to LANDRY Turner. RN    1012 This RN phoned Lizzie Shaikh MD. Waiting or call back. MD Krish called this  RN. MD ordered a CBC,CMP, and UA. MD stated she would be in around lunch time to check pt.    This RN notified Barbara Melgar RN

## 2022-03-11 ENCOUNTER — ANESTHESIA (OUTPATIENT)
Dept: SURGERY | Age: 32
DRG: 951 | End: 2022-03-11
Payer: MEDICAID

## 2022-03-11 ENCOUNTER — ANESTHESIA EVENT (OUTPATIENT)
Dept: SURGERY | Age: 32
DRG: 951 | End: 2022-03-11
Payer: MEDICAID

## 2022-03-11 LAB
ATRIAL RATE: 127 BPM
CALCULATED P AXIS, ECG09: 52 DEGREES
CALCULATED R AXIS, ECG10: 47 DEGREES
CALCULATED T AXIS, ECG11: 31 DEGREES
COVID-19 RAPID TEST, COVR: NOT DETECTED
DIAGNOSIS, 93000: NORMAL
P-R INTERVAL, ECG05: 126 MS
Q-T INTERVAL, ECG07: 308 MS
QRS DURATION, ECG06: 82 MS
QTC CALCULATION (BEZET), ECG08: 447 MS
SOURCE, COVRS: NORMAL
T4 FREE SERPL-MCNC: 1 NG/DL (ref 0.8–1.5)
VENTRICULAR RATE, ECG03: 127 BPM

## 2022-03-11 PROCEDURE — 74011000258 HC RX REV CODE- 258: Performed by: SURGERY

## 2022-03-11 PROCEDURE — 77030012770 HC TRCR OPT FX AMR -B: Performed by: SURGERY

## 2022-03-11 PROCEDURE — 74011000250 HC RX REV CODE- 250: Performed by: ANESTHESIOLOGY

## 2022-03-11 PROCEDURE — 76060000035 HC ANESTHESIA 2 TO 2.5 HR: Performed by: SURGERY

## 2022-03-11 PROCEDURE — 77030040506 HC DRN WND MDII -A: Performed by: SURGERY

## 2022-03-11 PROCEDURE — 74011250636 HC RX REV CODE- 250/636: Performed by: SURGERY

## 2022-03-11 PROCEDURE — 74011250636 HC RX REV CODE- 250/636: Performed by: NURSE ANESTHETIST, CERTIFIED REGISTERED

## 2022-03-11 PROCEDURE — 77030035277 HC OBTRTR BLDELSS DISP INTU -B: Performed by: SURGERY

## 2022-03-11 PROCEDURE — 74011250636 HC RX REV CODE- 250/636: Performed by: ANESTHESIOLOGY

## 2022-03-11 PROCEDURE — 74011250637 HC RX REV CODE- 250/637: Performed by: STUDENT IN AN ORGANIZED HEALTH CARE EDUCATION/TRAINING PROGRAM

## 2022-03-11 PROCEDURE — 77030031139 HC SUT VCRL2 J&J -A: Performed by: SURGERY

## 2022-03-11 PROCEDURE — 88304 TISSUE EXAM BY PATHOLOGIST: CPT

## 2022-03-11 PROCEDURE — 96375 TX/PRO/DX INJ NEW DRUG ADDON: CPT

## 2022-03-11 PROCEDURE — 74011250636 HC RX REV CODE- 250/636: Performed by: STUDENT IN AN ORGANIZED HEALTH CARE EDUCATION/TRAINING PROGRAM

## 2022-03-11 PROCEDURE — 77030020703 HC SEAL CANN DISP INTU -B: Performed by: SURGERY

## 2022-03-11 PROCEDURE — 77030010507 HC ADH SKN DERMBND J&J -B: Performed by: SURGERY

## 2022-03-11 PROCEDURE — 76010000876 HC OR TIME 2 TO 2.5HR INTENSV - TIER 2: Performed by: SURGERY

## 2022-03-11 PROCEDURE — G0378 HOSPITAL OBSERVATION PER HR: HCPCS

## 2022-03-11 PROCEDURE — 84439 ASSAY OF FREE THYROXINE: CPT

## 2022-03-11 PROCEDURE — 74011000250 HC RX REV CODE- 250: Performed by: SURGERY

## 2022-03-11 PROCEDURE — 77030008684 HC TU ET CUF COVD -B: Performed by: ANESTHESIOLOGY

## 2022-03-11 PROCEDURE — 2709999900 HC NON-CHARGEABLE SUPPLY: Performed by: SURGERY

## 2022-03-11 PROCEDURE — 77030035236 HC SUT PDS STRATFX BARB J&J -B: Performed by: SURGERY

## 2022-03-11 PROCEDURE — 77030035029 HC NDL INSUF VERES DISP COVD -B: Performed by: SURGERY

## 2022-03-11 PROCEDURE — 74011000250 HC RX REV CODE- 250: Performed by: NURSE ANESTHETIST, CERTIFIED REGISTERED

## 2022-03-11 PROCEDURE — 77030040259 HC STPLR DEV SUREFORM DVNCI INTU -F: Performed by: SURGERY

## 2022-03-11 PROCEDURE — 74011000250 HC RX REV CODE- 250: Performed by: STUDENT IN AN ORGANIZED HEALTH CARE EDUCATION/TRAINING PROGRAM

## 2022-03-11 PROCEDURE — 76210000017 HC OR PH I REC 1.5 TO 2 HR: Performed by: SURGERY

## 2022-03-11 PROCEDURE — 77030010939 HC CLP LIG TELE -B: Performed by: SURGERY

## 2022-03-11 PROCEDURE — 36415 COLL VENOUS BLD VENIPUNCTURE: CPT

## 2022-03-11 PROCEDURE — 65270000029 HC RM PRIVATE

## 2022-03-11 PROCEDURE — 74011250637 HC RX REV CODE- 250/637: Performed by: SURGERY

## 2022-03-11 PROCEDURE — 77030033188 HC TBNG FLTRD BIIFUR DISP CNMD -C: Performed by: SURGERY

## 2022-03-11 PROCEDURE — 8E0W4CZ ROBOTIC ASSISTED PROCEDURE OF TRUNK REGION, PERCUTANEOUS ENDOSCOPIC APPROACH: ICD-10-PCS | Performed by: SURGERY

## 2022-03-11 PROCEDURE — P9045 ALBUMIN (HUMAN), 5%, 250 ML: HCPCS | Performed by: NURSE ANESTHETIST, CERTIFIED REGISTERED

## 2022-03-11 PROCEDURE — 0FT44ZZ RESECTION OF GALLBLADDER, PERCUTANEOUS ENDOSCOPIC APPROACH: ICD-10-PCS | Performed by: SURGERY

## 2022-03-11 PROCEDURE — 87635 SARS-COV-2 COVID-19 AMP PRB: CPT

## 2022-03-11 PROCEDURE — 77030037032 HC INSRT SCIS CLICKLLINE DISP STOR -B: Performed by: SURGERY

## 2022-03-11 RX ORDER — ROCURONIUM BROMIDE 10 MG/ML
INJECTION, SOLUTION INTRAVENOUS AS NEEDED
Status: DISCONTINUED | OUTPATIENT
Start: 2022-03-11 | End: 2022-03-11 | Stop reason: HOSPADM

## 2022-03-11 RX ORDER — SODIUM CHLORIDE, SODIUM LACTATE, POTASSIUM CHLORIDE, CALCIUM CHLORIDE 600; 310; 30; 20 MG/100ML; MG/100ML; MG/100ML; MG/100ML
125 INJECTION, SOLUTION INTRAVENOUS CONTINUOUS
Status: DISCONTINUED | OUTPATIENT
Start: 2022-03-11 | End: 2022-03-11 | Stop reason: HOSPADM

## 2022-03-11 RX ORDER — ALBUMIN HUMAN 50 G/1000ML
SOLUTION INTRAVENOUS
Status: DISCONTINUED | OUTPATIENT
Start: 2022-03-11 | End: 2022-03-11 | Stop reason: HOSPADM

## 2022-03-11 RX ORDER — DEXAMETHASONE SODIUM PHOSPHATE 4 MG/ML
INJECTION, SOLUTION INTRA-ARTICULAR; INTRALESIONAL; INTRAMUSCULAR; INTRAVENOUS; SOFT TISSUE AS NEEDED
Status: DISCONTINUED | OUTPATIENT
Start: 2022-03-11 | End: 2022-03-11 | Stop reason: HOSPADM

## 2022-03-11 RX ORDER — GLYCOPYRROLATE 0.2 MG/ML
INJECTION INTRAMUSCULAR; INTRAVENOUS AS NEEDED
Status: DISCONTINUED | OUTPATIENT
Start: 2022-03-11 | End: 2022-03-11 | Stop reason: HOSPADM

## 2022-03-11 RX ORDER — SUCCINYLCHOLINE CHLORIDE 20 MG/ML
INJECTION INTRAMUSCULAR; INTRAVENOUS AS NEEDED
Status: DISCONTINUED | OUTPATIENT
Start: 2022-03-11 | End: 2022-03-11 | Stop reason: HOSPADM

## 2022-03-11 RX ORDER — PROPOFOL 10 MG/ML
INJECTION, EMULSION INTRAVENOUS AS NEEDED
Status: DISCONTINUED | OUTPATIENT
Start: 2022-03-11 | End: 2022-03-11 | Stop reason: HOSPADM

## 2022-03-11 RX ORDER — FENTANYL CITRATE 50 UG/ML
INJECTION, SOLUTION INTRAMUSCULAR; INTRAVENOUS AS NEEDED
Status: DISCONTINUED | OUTPATIENT
Start: 2022-03-11 | End: 2022-03-11 | Stop reason: HOSPADM

## 2022-03-11 RX ORDER — HYDROMORPHONE HYDROCHLORIDE 2 MG/ML
INJECTION, SOLUTION INTRAMUSCULAR; INTRAVENOUS; SUBCUTANEOUS AS NEEDED
Status: DISCONTINUED | OUTPATIENT
Start: 2022-03-11 | End: 2022-03-11 | Stop reason: HOSPADM

## 2022-03-11 RX ORDER — SODIUM CHLORIDE, SODIUM LACTATE, POTASSIUM CHLORIDE, CALCIUM CHLORIDE 600; 310; 30; 20 MG/100ML; MG/100ML; MG/100ML; MG/100ML
75 INJECTION, SOLUTION INTRAVENOUS CONTINUOUS
Status: DISCONTINUED | OUTPATIENT
Start: 2022-03-11 | End: 2022-03-14 | Stop reason: HOSPADM

## 2022-03-11 RX ORDER — HYDROMORPHONE HYDROCHLORIDE 1 MG/ML
.25-1 INJECTION, SOLUTION INTRAMUSCULAR; INTRAVENOUS; SUBCUTANEOUS
Status: DISCONTINUED | OUTPATIENT
Start: 2022-03-11 | End: 2022-03-11 | Stop reason: HOSPADM

## 2022-03-11 RX ORDER — MORPHINE SULFATE 2 MG/ML
2 INJECTION, SOLUTION INTRAMUSCULAR; INTRAVENOUS
Status: DISCONTINUED | OUTPATIENT
Start: 2022-03-11 | End: 2022-03-14 | Stop reason: HOSPADM

## 2022-03-11 RX ORDER — LIDOCAINE HYDROCHLORIDE 20 MG/ML
INJECTION, SOLUTION EPIDURAL; INFILTRATION; INTRACAUDAL; PERINEURAL AS NEEDED
Status: DISCONTINUED | OUTPATIENT
Start: 2022-03-11 | End: 2022-03-11 | Stop reason: HOSPADM

## 2022-03-11 RX ORDER — NEOSTIGMINE METHYLSULFATE 1 MG/ML
INJECTION, SOLUTION INTRAVENOUS AS NEEDED
Status: DISCONTINUED | OUTPATIENT
Start: 2022-03-11 | End: 2022-03-11 | Stop reason: HOSPADM

## 2022-03-11 RX ORDER — BUPIVACAINE HYDROCHLORIDE 5 MG/ML
INJECTION, SOLUTION EPIDURAL; INTRACAUDAL AS NEEDED
Status: DISCONTINUED | OUTPATIENT
Start: 2022-03-11 | End: 2022-03-11 | Stop reason: HOSPADM

## 2022-03-11 RX ORDER — DIPHENHYDRAMINE HYDROCHLORIDE 50 MG/ML
12.5 INJECTION, SOLUTION INTRAMUSCULAR; INTRAVENOUS AS NEEDED
Status: DISCONTINUED | OUTPATIENT
Start: 2022-03-11 | End: 2022-03-11 | Stop reason: HOSPADM

## 2022-03-11 RX ORDER — MORPHINE SULFATE 1 MG/ML
2 INJECTION, SOLUTION EPIDURAL; INTRATHECAL; INTRAVENOUS
Status: DISCONTINUED | OUTPATIENT
Start: 2022-03-11 | End: 2022-03-11

## 2022-03-11 RX ORDER — SODIUM CHLORIDE 0.9 % (FLUSH) 0.9 %
5-40 SYRINGE (ML) INJECTION AS NEEDED
Status: DISCONTINUED | OUTPATIENT
Start: 2022-03-11 | End: 2022-03-11 | Stop reason: HOSPADM

## 2022-03-11 RX ORDER — ONDANSETRON 2 MG/ML
INJECTION INTRAMUSCULAR; INTRAVENOUS AS NEEDED
Status: DISCONTINUED | OUTPATIENT
Start: 2022-03-11 | End: 2022-03-11 | Stop reason: HOSPADM

## 2022-03-11 RX ORDER — PHENYLEPHRINE HCL IN 0.9% NACL 0.4MG/10ML
SYRINGE (ML) INTRAVENOUS AS NEEDED
Status: DISCONTINUED | OUTPATIENT
Start: 2022-03-11 | End: 2022-03-11 | Stop reason: HOSPADM

## 2022-03-11 RX ORDER — SODIUM CHLORIDE 0.9 % (FLUSH) 0.9 %
5-40 SYRINGE (ML) INJECTION EVERY 8 HOURS
Status: DISCONTINUED | OUTPATIENT
Start: 2022-03-11 | End: 2022-03-11 | Stop reason: HOSPADM

## 2022-03-11 RX ORDER — OXYCODONE HYDROCHLORIDE 5 MG/1
5 TABLET ORAL
Status: DISCONTINUED | OUTPATIENT
Start: 2022-03-11 | End: 2022-03-14 | Stop reason: HOSPADM

## 2022-03-11 RX ADMIN — OXYCODONE 5 MG: 5 TABLET ORAL at 21:00

## 2022-03-11 RX ADMIN — ACETAMINOPHEN 1000 MG: 500 TABLET ORAL at 01:32

## 2022-03-11 RX ADMIN — FENTANYL CITRATE 50 MCG: 50 INJECTION, SOLUTION INTRAMUSCULAR; INTRAVENOUS at 16:00

## 2022-03-11 RX ADMIN — Medication 150 MCG: at 16:40

## 2022-03-11 RX ADMIN — PIPERACILLIN AND TAZOBACTAM 3.38 G: 3; .375 INJECTION, POWDER, LYOPHILIZED, FOR SOLUTION INTRAVENOUS at 20:51

## 2022-03-11 RX ADMIN — SODIUM CHLORIDE, POTASSIUM CHLORIDE, SODIUM LACTATE AND CALCIUM CHLORIDE 125 ML/HR: 600; 310; 30; 20 INJECTION, SOLUTION INTRAVENOUS at 19:28

## 2022-03-11 RX ADMIN — ALBUMIN (HUMAN) 1000 ML/HR: 12.5 SOLUTION INTRAVENOUS at 16:45

## 2022-03-11 RX ADMIN — HYDROMORPHONE HYDROCHLORIDE 0.5 MG: 1 INJECTION, SOLUTION INTRAMUSCULAR; INTRAVENOUS; SUBCUTANEOUS at 19:28

## 2022-03-11 RX ADMIN — SODIUM CHLORIDE, POTASSIUM CHLORIDE, SODIUM LACTATE AND CALCIUM CHLORIDE: 600; 310; 30; 20 INJECTION, SOLUTION INTRAVENOUS at 17:03

## 2022-03-11 RX ADMIN — Medication 80 MCG: at 16:31

## 2022-03-11 RX ADMIN — ROCURONIUM BROMIDE 10 MG: 10 INJECTION INTRAVENOUS at 16:07

## 2022-03-11 RX ADMIN — ROCURONIUM BROMIDE 10 MG: 10 INJECTION INTRAVENOUS at 17:23

## 2022-03-11 RX ADMIN — HYDROMORPHONE HYDROCHLORIDE 0.25 MG: 2 INJECTION INTRAMUSCULAR; INTRAVENOUS; SUBCUTANEOUS at 18:05

## 2022-03-11 RX ADMIN — FENTANYL CITRATE 100 MCG: 50 INJECTION, SOLUTION INTRAMUSCULAR; INTRAVENOUS at 16:13

## 2022-03-11 RX ADMIN — LIDOCAINE HYDROCHLORIDE 20 MG: 20 INJECTION, SOLUTION EPIDURAL; INFILTRATION; INTRACAUDAL; PERINEURAL at 16:07

## 2022-03-11 RX ADMIN — Medication 80 MCG: at 16:17

## 2022-03-11 RX ADMIN — SODIUM CHLORIDE, PRESERVATIVE FREE 10 ML: 5 INJECTION INTRAVENOUS at 20:52

## 2022-03-11 RX ADMIN — ONDANSETRON HYDROCHLORIDE 4 MG: 2 SOLUTION INTRAMUSCULAR; INTRAVENOUS at 16:15

## 2022-03-11 RX ADMIN — CYCLOBENZAPRINE 5 MG: 10 TABLET, FILM COATED ORAL at 11:52

## 2022-03-11 RX ADMIN — Medication 2 MG: at 18:05

## 2022-03-11 RX ADMIN — Medication 80 MCG: at 16:34

## 2022-03-11 RX ADMIN — Medication 80 MCG: at 16:37

## 2022-03-11 RX ADMIN — PIPERACILLIN AND TAZOBACTAM 3.38 G: 3; .375 INJECTION, POWDER, LYOPHILIZED, FOR SOLUTION INTRAVENOUS at 11:53

## 2022-03-11 RX ADMIN — ALBUMIN (HUMAN) 1000 ML/HR: 12.5 SOLUTION INTRAVENOUS at 17:00

## 2022-03-11 RX ADMIN — DIPHENHYDRAMINE HYDROCHLORIDE 25 MG: 25 CAPSULE ORAL at 01:32

## 2022-03-11 RX ADMIN — DEXAMETHASONE SODIUM PHOSPHATE 8 MG: 4 INJECTION, SOLUTION INTRAMUSCULAR; INTRAVENOUS at 16:15

## 2022-03-11 RX ADMIN — ROCURONIUM BROMIDE 10 MG: 10 INJECTION INTRAVENOUS at 16:45

## 2022-03-11 RX ADMIN — ACETAMINOPHEN 1000 MG: 500 TABLET ORAL at 07:22

## 2022-03-11 RX ADMIN — SUCCINYLCHOLINE CHLORIDE 100 MG: 20 INJECTION, SOLUTION INTRAMUSCULAR; INTRAVENOUS at 16:07

## 2022-03-11 RX ADMIN — SODIUM CHLORIDE, POTASSIUM CHLORIDE, SODIUM LACTATE AND CALCIUM CHLORIDE: 600; 310; 30; 20 INJECTION, SOLUTION INTRAVENOUS at 16:14

## 2022-03-11 RX ADMIN — CYCLOBENZAPRINE 5 MG: 10 TABLET, FILM COATED ORAL at 07:23

## 2022-03-11 RX ADMIN — FAMOTIDINE 20 MG: 20 TABLET ORAL at 11:52

## 2022-03-11 RX ADMIN — PIPERACILLIN AND TAZOBACTAM 3.38 G: 3; .375 INJECTION, POWDER, LYOPHILIZED, FOR SOLUTION INTRAVENOUS at 16:13

## 2022-03-11 RX ADMIN — SODIUM CHLORIDE, POTASSIUM CHLORIDE, SODIUM LACTATE AND CALCIUM CHLORIDE: 600; 310; 30; 20 INJECTION, SOLUTION INTRAVENOUS at 16:00

## 2022-03-11 RX ADMIN — FENTANYL CITRATE 100 MCG: 50 INJECTION, SOLUTION INTRAMUSCULAR; INTRAVENOUS at 16:07

## 2022-03-11 RX ADMIN — PROPOFOL 150 MG: 10 INJECTION, EMULSION INTRAVENOUS at 16:07

## 2022-03-11 RX ADMIN — HYDROMORPHONE HYDROCHLORIDE 0.25 MG: 2 INJECTION INTRAMUSCULAR; INTRAVENOUS; SUBCUTANEOUS at 18:23

## 2022-03-11 RX ADMIN — GLYCOPYRROLATE 0.2 MG: 0.2 INJECTION INTRAMUSCULAR; INTRAVENOUS at 18:05

## 2022-03-11 NOTE — CONSULTS
Surgery History and Physical     Subjective:      Billie Wallace is a 27 y.o. female H-08-M-3-1-9-4 at 22w presented yesterday w/ severe epigastric and right flank pain. Pt has recent hx of biliary colic/CBD stone and subsequent ERCP on 22 with Dr Trena Mitchell at [facility]. Gen Surg was consulted at the time, but due to the patient being in first trimester, surgery was delayed with pt to F/U outpatient. Pt had yet to f/u with Gen Surg at the onset of this current episode. Pt noticed Wednesday that after eating ice cream she started to have \"gallbladder\" pain again. This episode was accompanied with n/v.  Pain, N/V started to subside in epigastric region, but then migrated to RLQ and right flank. Pt assumed this pain was associated with a muscle spasm she was dx with 2 weeks prior. She attempted to take tylenol without relief.       Once at hospital it was noted that pts WBC 15.1, bili 0.4, LFTs WNL, alk phos still elevated 218. Ab US showed There is a gallstone in the neck. The lumen is filled with sludge,and there is mild distention wall thickening. CBD 0.5 cm in diameter. GI and gen surg consulted.     Pt states that currently she is having no n/v, but still having pain. Epigastric pain is mild compared to pain on Wednesday, but R flank and RLQ pain present. Flexeril helping some, but pain still present.       PMHx and PSHX documented below, sig for pre-eclampsia with all pregnancies and multiple renal stones, abd surgeries consist of lap appy and . Pt without tobacco, alcohol, or illicit drug use. No anticoagulants.   Allergies on chart.               Past Medical History:   Diagnosis Date    Abnormal Papanicolaou smear of cervix      Anemia      Bipolar disorder ([facility] [address]      Gestational hypertension      Hypertension in pregnancy       pre-E with prior pregnancy    Hypothyroidism 3/10/2022    Ill-defined condition 2019     right shoulder injury/pain    Kidney disease       kidney stones    Postpartum depression      PTSD (post-traumatic stress disorder)      Trauma              Past Surgical History:   Procedure Laterality Date    HX APPENDECTOMY        HX  SECTION             HX LEEP PROCEDURE   2019    HX OTHER SURGICAL        HX UROLOGICAL         kidney stones            Family History   Problem Relation Age of Onset    Stroke Mother      Hypertension Mother      Hypertension Father      Mult Sclerosis Father        Social History              Socioeconomic History    Marital status:    Tobacco Use    Smoking status: Never Smoker    Smokeless tobacco: Never Used   Substance and Sexual Activity    Alcohol use: Not Currently    Drug use: Never    Sexual activity: Yes                Current Facility-Administered Medications   Medication Dose Route Frequency    piperacillin-tazobactam (ZOSYN) 3.375 g in 0.9% sodium chloride (MBP/ADV) 100 mL MBP  3.375 g IntraVENous Q8H    lactated Ringers infusion 1,000 mL  1,000 mL IntraVENous CONTINUOUS    sodium chloride (NS) flush 5-40 mL  5-40 mL IntraVENous Q8H    sodium chloride (NS) flush 5-40 mL  5-40 mL IntraVENous PRN    ondansetron (ZOFRAN ODT) tablet 4 mg  4 mg Oral Q8H PRN    diphenhydrAMINE (BENADRYL) capsule 25 mg  25 mg Oral Q4H PRN    famotidine (PEPCID) tablet 20 mg  20 mg Oral Q12H PRN    cyclobenzaprine (FLEXERIL) tablet 5 mg  5 mg Oral TID PRN    acetaminophen (TYLENOL) tablet 1,000 mg  1,000 mg Oral Q6H PRN            Allergies   Allergen Reactions    Latex Itching and Swelling    Iodine Itching and Swelling    Reglan [Metoclopramide] Other (comments)       Crawling out of skin         Review of Systems:REVIEW OF SYSTEMS:     []? Unable to obtain  ROS due to  []?    mental status change  []? sedated   []? intubated   [x]?     Total of 12 systems reviewed as follows:     Constitutional: neg for fevers, chills, weight loss, malaise  Eyes: negative for blurry vision or double vision  Ears, nose, mouth, throat, and face: negative for sore throat, negative for lip swelling  Respiratory: negative for SOB and cough  Cardiovascular: negative for CP, negative for palpitations  Gastrointestinal: see HPI  Genitourinary: negative for dysuria  Integument/breast: negative for skin rash  Hematologic/lymphatic: negative for bruising  Musculoskeletal: see HPI  Neurological: no dizziness or h/a     Objective:         Patient Vitals for the past 8 hrs:    BP Temp Pulse Resp   22 0756 (!) 104/58 98.3 °F (36.8 °C) (!) 115 16         Temp (24hrs), Av.2 °F (36.8 °C), Min:97.7 °F (36.5 °C), Max:99.3 °F (37.4 °C)        Physical Exam:  General:  Alert, cooperative, no distress, appears stated age. Eyes:  Conjunctivae/corneas clear. Nose: Nares normal. Septum midline   Mouth/Throat: Lips, mucosa, and tongue normal.    Neck: Supple, symmetrical, trachea midline   Lungs:   Clear to auscultation bilaterally. Heart:  Regular rate and rhythm   Abdomen:   Soft, tender in epigastric region as well as RLQ and right flank to palpation, non-distended, fundus palpable    Extremities: Extremities normal, atraumatic, no cyanosis or edema.    Skin: Skin color, texture, turgor normal. No rashes or lesions   Neuro: Alert, oriented, speech clear      Labs:       Recent Labs     03/10/22  1024   WBC 15.1*   HGB 11.1*   HCT 32.9*             Recent Labs     03/10/22  1024      K 4.3      CO2 25   *   BUN 5*   CREA 0.54*   CA 8.6   ALB 2.6*   TBILI 0.4   ALT 25      No results for input(s): INR, INREXT in the last 72 hours.        Assessment and Plan:      Cholelithiasis, abd/flank pain, n/v  IV antibx  NPO  IV fluids  Pain meds as needed IV  Plan for robotic assisted laproscopic cholecystectomy  Fetal heart tones pre and post op  Home post op if feeling well and good fetal heart tones

## 2022-03-11 NOTE — PROGRESS NOTES
AntePartum Progress Note    Melodie Bryan  22w2d    Patient admitted for back pain and abdominal pain 22w2d Estimated Date of Delivery: 22 states she does not  have  contractions, vaginal bleeding. Tolerated breakfast this morning with no vomiting and only mild nausea. Vitals:  Patient Vitals for the past 24 hrs:   BP Temp Pulse Resp SpO2 Height Weight   22 0756 (!) 104/58 98.3 °F (36.8 °C) (!) 115 16 -- -- --   22 0434 -- -- -- -- 98 % -- --   22 0432 (!) 101/56 97.7 °F (36.5 °C) 100 16 98 % -- --   22 0354 -- -- -- -- 97 % -- --   22 0349 -- -- -- -- 97 % -- --   22 0344 -- -- -- -- 97 % -- --   22 0339 -- -- -- -- 97 % -- --   22 0304 -- -- -- -- 97 % -- --   22 0204 -- -- -- -- 97 % -- --   22 0134 -- -- -- -- 98 % -- --   22 0056 (!) 97/56 97.8 °F (36.6 °C) (!) 109 18 -- -- --   03/10/22 1925 117/64 99.3 °F (37.4 °C) (!) 131 20 99 % -- --   03/10/22 1824 119/69 -- (!) 130 16 98 % -- --   03/10/22 1809 -- -- -- -- -- 5' 2\" (1.575 m) 69.4 kg (153 lb)   03/10/22 1652 -- -- -- -- 97 % -- --   03/10/22 1630 -- -- -- -- 98 % -- --   03/10/22 1600 -- 98.1 °F (36.7 °C) -- -- -- -- --   03/10/22 1153 -- -- -- -- 100 % -- --   03/10/22 1126 -- 98.8 °F (37.1 °C) -- -- -- -- --   03/10/22 1058 -- -- -- -- 98 % -- --   03/10/22 1038 -- -- -- -- 99 % -- --   03/10/22 1013 113/77 98.8 °F (37.1 °C) (!) 114 16 99 % -- --     Temp (24hrs), Av.4 °F (36.9 °C), Min:97.7 °F (36.5 °C), Max:99.3 °F (37.4 °C)    I&O:   No intake/output data recorded. No intake/output data recorded. FH +  Uterine Activity:none    Exam:  Patient without distress.                Abdomen soft, non-tender               Fundus soft and non tender               Lower extremities edema No                                           Labs:   Recent Results (from the past 24 hour(s))   CBC WITH AUTOMATED DIFF    Collection Time: 03/10/22 10:24 AM   Result Value Ref Range    WBC 15.1 (H) 3.6 - 11.0 K/uL    RBC 3.77 (L) 3.80 - 5.20 M/uL    HGB 11.1 (L) 11.5 - 16.0 g/dL    HCT 32.9 (L) 35.0 - 47.0 %    MCV 87.3 80.0 - 99.0 FL    MCH 29.4 26.0 - 34.0 PG    MCHC 33.7 30.0 - 36.5 g/dL    RDW 14.1 11.5 - 14.5 %    PLATELET 117 194 - 718 K/uL    MPV 8.6 (L) 8.9 - 12.9 FL    NRBC 0.0 0  WBC    ABSOLUTE NRBC 0.00 0.00 - 0.01 K/uL    NEUTROPHILS 90 (H) 32 - 75 %    LYMPHOCYTES 6 (L) 12 - 49 %    MONOCYTES 3 (L) 5 - 13 %    EOSINOPHILS 0 0 - 7 %    BASOPHILS 0 0 - 1 %    IMMATURE GRANULOCYTES 1 (H) 0.0 - 0.5 %    ABS. NEUTROPHILS 13.5 (H) 1.8 - 8.0 K/UL    ABS. LYMPHOCYTES 0.9 0.8 - 3.5 K/UL    ABS. MONOCYTES 0.5 0.0 - 1.0 K/UL    ABS. EOSINOPHILS 0.0 0.0 - 0.4 K/UL    ABS. BASOPHILS 0.0 0.0 - 0.1 K/UL    ABS. IMM. GRANS. 0.2 (H) 0.00 - 0.04 K/UL    DF AUTOMATED     METABOLIC PANEL, COMPREHENSIVE    Collection Time: 03/10/22 10:24 AM   Result Value Ref Range    Sodium 137 136 - 145 mmol/L    Potassium 4.3 3.5 - 5.1 mmol/L    Chloride 103 97 - 108 mmol/L    CO2 25 21 - 32 mmol/L    Anion gap 9 5 - 15 mmol/L    Glucose 110 (H) 65 - 100 mg/dL    BUN 5 (L) 6 - 20 MG/DL    Creatinine 0.54 (L) 0.55 - 1.02 MG/DL    BUN/Creatinine ratio 9 (L) 12 - 20      GFR est AA >60 >60 ml/min/1.73m2    GFR est non-AA >60 >60 ml/min/1.73m2    Calcium 8.6 8.5 - 10.1 MG/DL    Bilirubin, total 0.4 0.2 - 1.0 MG/DL    ALT (SGPT) 25 12 - 78 U/L    AST (SGOT) 11 (L) 15 - 37 U/L    Alk.  phosphatase 218 (H) 45 - 117 U/L    Protein, total 6.7 6.4 - 8.2 g/dL    Albumin 2.6 (L) 3.5 - 5.0 g/dL    Globulin 4.1 (H) 2.0 - 4.0 g/dL    A-G Ratio 0.6 (L) 1.1 - 2.2     URINALYSIS W/MICROSCOPIC    Collection Time: 03/10/22 10:24 AM   Result Value Ref Range    Color DARK YELLOW      Appearance CLOUDY (A) CLEAR      Specific gravity 1.022 1.003 - 1.030      pH (UA) 8.0 5.0 - 8.0      Protein TRACE (A) NEG mg/dL    Glucose Negative NEG mg/dL    Ketone 40 (A) NEG mg/dL    Bilirubin Negative NEG      Blood Negative NEG Urobilinogen 1.0 0.2 - 1.0 EU/dL    Nitrites Negative NEG      Leukocyte Esterase SMALL (A) NEG      WBC 0-4 0 - 4 /hpf    RBC 0-5 0 - 5 /hpf    Epithelial cells MODERATE (A) FEW /lpf    Bacteria 1+ (A) NEG /hpf   URINE CULTURE HOLD SAMPLE    Collection Time: 03/10/22 10:24 AM    Specimen: Serum; Urine   Result Value Ref Range    Urine culture hold        Urine on hold in Microbiology dept for 2 days. If unpreserved urine is submitted, it cannot be used for addtional testing after 24 hours, recollection will be required. AMYLASE    Collection Time: 03/10/22 10:24 AM   Result Value Ref Range    Amylase 62 25 - 115 U/L   LIPASE    Collection Time: 03/10/22 10:24 AM   Result Value Ref Range    Lipase 97 73 - 393 U/L   TYPE & SCREEN    Collection Time: 03/10/22 10:34 AM   Result Value Ref Range    Crossmatch Expiration 03/13/2022,2359     ABO/Rh(D) A NEGATIVE     Antibody screen NEG     Comment Previously identified Anti D passively acquired.      Unit number [id number]     Blood component type RC LRIR,2     Unit division 00     Status of unit ALLOCATED     Crossmatch result Compatible    EKG, 12 LEAD, INITIAL    Collection Time: 03/10/22  3:59 PM   Result Value Ref Range    Ventricular Rate 127 BPM    Atrial Rate 127 BPM    P-R Interval 126 ms    QRS Duration 82 ms    Q-T Interval 308 ms    QTC Calculation (Bezet) 447 ms    Calculated P Axis 52 degrees    Calculated R Axis 47 degrees    Calculated T Axis 31 degrees    Diagnosis       Sinus tachycardia  Otherwise normal ECG  No previous ECGs available     TSH 3RD GENERATION    Collection Time: 03/10/22  4:06 PM   Result Value Ref Range    TSH 0.27 (L) 0.36 - 3.74 uIU/mL   T4, FREE    Collection Time: 03/11/22  4:37 AM   Result Value Ref Range    T4, Free 1.0 0.8 - 1.5 NG/DL       Assessment and Plan:   IUP @ 22w2d   Patient Active Problem List    Diagnosis Date Noted    Pregnancy 03/10/2022    Hypothyroidism 03/10/2022    Tachycardia 03/10/2022    Gallstones 03/10/2022    22 weeks gestation of pregnancy 03/10/2022    Symptomatic cholelithiasis 01/10/2022    Preeclampsia 2020    Gestational hypertension 2020    Pregnant and not yet delivered in third trimester 2020    SARAH II (cervical intraepithelial neoplasia II) 2019       A/P:  27 y.o.  at [id number] w/ back pain and abdominal pain, possibly secondary to gallstones  - CMP, amylase, lipase wnl  - WBC stable  - GI consult appreciate recommendations -- recommend general surgery consult with possible transfer to [facility] for ERCP. Management pending general surgery recommendations.   - FH q shift   - no contractions on tocometer, no obstetric concerns at this time       Patsy Rueda MD  [facility]

## 2022-03-11 NOTE — ADVANCED PRACTICE NURSE
Surgery History and Physical    Subjective:      Bard Hinton is a 27 y.o. female Q-95-U-3-1-9-4 at 22w presented yesterday w/ severe epigastric and right flank pain. Pt has recent hx of biliary colic/CBD stone and subsequent ERCP on 22 with Dr Alvaro Tong at [facility]. Gen Surg was consulted at the time, but due to the patient being in first trimester, surgery was delayed with pt to F/U outpatient. Pt had yet to f/u with Gen Surg at the onset of this current episode. Pt noticed Wednesday that after eating ice cream she started to have \"gallbladder\" pain again. This episode was accompanied with n/v.  Pain, N/V started to subside in epigastric region, but then migrated to RLQ and right flank. Pt assumed this pain was associated with a muscle spasm she was dx with 2 weeks prior. She attempted to take tylenol without relief. Once at hospital it was noted that pts WBC 15.1, bili 0.4, LFTs WNL, alk phos still elevated 218. Ab US showed There is a gallstone in the neck. The lumen is filled with sludge,and there is mild distention wall thickening. CBD 0.5 cm in diameter. GI and gen surg consulted. Pt states that currently she is having no n/v, but still having pain. Epigastric pain is mild compared to pain on Wednesday, but R flank and RLQ pain present. Flexeril helping some, but pain still present. PMHx and PSHX documented below, sig for pre-eclampsia with all pregnancies and multiple renal stones, abd surgeries consist of lap appy and . Pt without tobacco, alcohol, or illicit drug use. No anticoagulants. Allergies on chart.          Past Medical History:   Diagnosis Date    Abnormal Papanicolaou smear of cervix     Anemia     B[facility] disorder ([facility] [address]     Gestational hypertension     Hypertension in pregnancy     pre-E with prior pregnancy    Hypothyroidism 3/10/2022    Ill-defined condition 2019    right shoulder injury/pain    Kidney disease     kidney stones    Postpartum depression     PTSD (post-traumatic stress disorder)     Trauma      Past Surgical History:   Procedure Laterality Date    HX APPENDECTOMY      HX  SECTION          HX LEEP PROCEDURE  2019    HX OTHER SURGICAL      HX UROLOGICAL      kidney stones      Family History   Problem Relation Age of Onset    Stroke Mother     Hypertension Mother     Hypertension Father     Mult Sclerosis Father      Social History     Socioeconomic History    Marital status:    Tobacco Use    Smoking status: Never Smoker    Smokeless tobacco: Never Used   Substance and Sexual Activity    Alcohol use: Not Currently    Drug use: Never    Sexual activity: Yes      Current Facility-Administered Medications   Medication Dose Route Frequency    piperacillin-tazobactam (ZOSYN) 3.375 g in 0.9% sodium chloride (MBP/ADV) 100 mL MBP  3.375 g IntraVENous Q8H    lactated Ringers infusion 1,000 mL  1,000 mL IntraVENous CONTINUOUS    sodium chloride (NS) flush 5-40 mL  5-40 mL IntraVENous Q8H    sodium chloride (NS) flush 5-40 mL  5-40 mL IntraVENous PRN    ondansetron (ZOFRAN ODT) tablet 4 mg  4 mg Oral Q8H PRN    diphenhydrAMINE (BENADRYL) capsule 25 mg  25 mg Oral Q4H PRN    famotidine (PEPCID) tablet 20 mg  20 mg Oral Q12H PRN    cyclobenzaprine (FLEXERIL) tablet 5 mg  5 mg Oral TID PRN    acetaminophen (TYLENOL) tablet 1,000 mg  1,000 mg Oral Q6H PRN      Allergies   Allergen Reactions    Latex Itching and Swelling    Iodine Itching and Swelling    Reglan [Metoclopramide] Other (comments)     Crawling out of skin       Review of Systems:REVIEW OF SYSTEMS:     []     Unable to obtain  ROS due to  []    mental status change  []    sedated   []    intubated   [x]    Total of 12 systems reviewed as follows:    Constitutional: neg for fevers, chills, weight loss, malaise  Eyes: negative for blurry vision or double vision  Ears, nose, mouth, throat, and face: negative for sore throat, negative for lip swelling  Respiratory: negative for SOB and cough  Cardiovascular: negative for CP, negative for palpitations  Gastrointestinal: see HPI  Genitourinary: negative for dysuria  Integument/breast: negative for skin rash  Hematologic/lymphatic: negative for bruising  Musculoskeletal: see HPI  Neurological: no dizziness or h/a    Objective:        Patient Vitals for the past 8 hrs:   BP Temp Pulse Resp   22 0756 (!) 104/58 98.3 °F (36.8 °C) (!) 115 16       Temp (24hrs), Av.2 °F (36.8 °C), Min:97.7 °F (36.5 °C), Max:99.3 °F (37.4 °C)      Physical Exam:  General:  Alert, cooperative, no distress, appears stated age. Eyes:  Conjunctivae/corneas clear. Nose: Nares normal. Septum midline   Mouth/Throat: Lips, mucosa, and tongue normal.    Neck: Supple, symmetrical, trachea midline   Lungs:   Clear to auscultation bilaterally. Heart:  Regular rate and rhythm   Abdomen:   Soft, tender in epigastric region as well as RLQ and right flank to palpation, non-distended, fundus palpable    Extremities: Extremities normal, atraumatic, no cyanosis or edema. Skin: Skin color, texture, turgor normal. No rashes or lesions   Neuro: Alert, oriented, speech clear     Labs:   Recent Labs     03/10/22  1024   WBC 15.1*   HGB 11.1*   HCT 32.9*        Recent Labs     03/10/22  1024      K 4.3      CO2 25   *   BUN 5*   CREA 0.54*   CA 8.6   ALB 2.6*   TBILI 0.4   ALT 25     No results for input(s): INR, INREXT in the last 72 hours. Assessment and Plan:     Cholelithiasis, abd/flank pain, n/v  IV antibx  NPO  IV fluids  Pain meds as needed IV  Plan for robotic assisted laproscopic cholecystectomy    Dr Danielle Erp to see pt. Her addendum to follow.     Signed By: Justin Black NP     2022     Pt seen and examined  Agree with above  Discussed risks and benefits of surgery with the patient including infection bleeding, damage to biliary and other adjacent structures and risk to fetus. She understands and would like to proceed.   Electronically signed by:  Isabel Abernathy MD  [address] [facility]  Office: [phone]  Fax: [phone]

## 2022-03-11 NOTE — BRIEF OP NOTE
Brief Postoperative Note    Patient: Hilda Mendiola  YOB: 1990  MRN: [id number]    Date of Procedure: 3/11/2022     Pre-Op Diagnosis: cholecystitis    Post-Op Diagnosis: Same as preoperative diagnosis. and severe acute cholecystitis with perforation      Procedure(s):  ROBOTIC ASSISTED Partial Cholecystectomy    Surgeon(s):  Karina Zarate MD    Surgical Assistant: Surg Asst-1: Maulik Pals    Anesthesia: General     Estimated Blood Loss (mL): less than 169     Complications: None    Specimens:   ID Type Source Tests Collected by Time Destination   1 : GALLBLADDER Preservative Gallbladder  Karina Zarate MD 3/11/2022 1744 Pathology        Implants: * No implants in log *    Drains:   Harman-Nieves Drain 03/11/22 Anterior Abdomen (Active)   Site Assessment Clean, dry, & intact 03/11/22 1746   Dressing Status Clean, dry, & intact 03/11/22 1746   Status Patent; Charged;Draining 03/11/22 1746       Findings: acutely inflamed thickened gallbladder encased with omentum  When omentum was taken down significant purulence spilled, 2.5 cm stone impacted in neck, purulence fluid in pelvis on initial inspection, gravid uterus no injury to uterus    Electronically Signed by Yaw Melendrez MD on 3/11/2022 at 6:08 PM

## 2022-03-11 NOTE — CONSULTS
Aria Vargas, RA-C  [phone] cell     Gastroenterology Consultation Note      Admit Date: 3/10/2022  Consult Date: 3/11/2022   I greatly appreciate your asking me to see Sylvie Terrazas, thank you very much for the opportunity to participate in her care. Narrative Assessment and Plan   28 y/o female with history of CBD stone s/p ERCP 1/2022 by Dr. Dejuan Marie at [facility]. She is 22 weeks pregnant. Reports acute onset of epigastric pain with nausea the night before admission. LFTs fairly stable from discharge in January except . Ultrasound shows stone in GB neck, CBD 5 mm. WBC 15.1, pus and sludge in bile duct at time of ERCP. Seen by surgery when hospitalized in January but did not follow up because she did not feel they understood that she is pregnant. Normal lipase and amylase. Able to eat this morning with only mild nausea. · No one here to do ERCP today or over the weekend. If it is needed urgently would need to transfer to [facility] for procedure. · Recommend surgical consult just to get on board given her reluctance to follow up with the previous surgeon  · Consider abx given leukocytosis and finding of pus on previous ERCP  · Diet as tolerated  · Follow CBC and LFTs    Subjective:     Chief Complaint: Abdominal Pain, Nausea, Vomiting, elevated LFTs, cholelithiasis    History of Present Illness: 28 y/o female with history of CBD stone s/p ERCP 1/2022 by Dr. Dejuan Marie at [facility]. She is 22 weeks pregnant. Reports acute onset of epigastric pain with nausea the night before admission. LFTs fairly stable from discharge in January except . Ultrasound shows stone in GB neck, CBD 5 mm. WBC 15.1, pus and sludge in bile duct at time of ERCP. Seen by surgery when hospitalized in January but did not follow up because she did not feel they understood that she is pregnant. Normal lipase and amylase. Able to eat this morning with only mild nausea.  Pertinent labs: hgb 11.1, hct 32.9, MCV 88.1, platelets 698, TB 0.4, , AST 11, ALT 25. PCP:  Eitan Garcia NP    Past Medical History:   Diagnosis Date    Abnormal Papanicolaou smear of cervix     Anemia     Bipolar disorder ([facility] [address]     Gestational hypertension     Hypertension in pregnancy     pre-E with prior pregnancy    Hypothyroidism 3/10/2022    Ill-defined condition 2019    right shoulder injury/pain    Kidney disease     kidney stones    Postpartum depression     PTSD (post-traumatic stress disorder)     Trauma         Past Surgical History:   Procedure Laterality Date    HX APPENDECTOMY      HX  SECTION          HX LEEP PROCEDURE  2019    HX OTHER SURGICAL      HX UROLOGICAL      kidney stones       Social History     Tobacco Use    Smoking status: Never Smoker    Smokeless tobacco: Never Used   Substance Use Topics    Alcohol use: Not Currently        Family History   Problem Relation Age of Onset    Stroke Mother     Hypertension Mother     Hypertension Father     Mult Sclerosis Father         Allergies   Allergen Reactions    Latex Itching and Swelling    Iodine Itching and Swelling    Reglan [Metoclopramide] Other (comments)     Crawling out of skin            Home Medications:  Prior to Admission Medications   Prescriptions Last Dose Informant Patient Reported? Taking?   acetaminophen (Tylenol Extra Strength) 500 mg tablet 3/9/2022 at Unknown time  Yes Yes   Sig: Take 500 mg by mouth every six (6) hours as needed for Pain. ondansetron hcl (Zofran) 4 mg tablet 3/9/2022 at Unknown time  Yes Yes   Sig: Take 4 mg by mouth every eight (8) hours as needed for Nausea or Nausea or Vomiting. prenatal 35/RDJE fum/folic/dha (PRENATAL-1 PO) 3/9/2022 at Unknown time  Yes Yes   Sig: Take  by mouth.       Facility-Administered Medications: None       Hospital Medications:  Current Facility-Administered Medications   Medication Dose Route Frequency    lactated Ringers infusion 1,000 mL  1,000 mL IntraVENous CONTINUOUS    sodium chloride (NS) flush 5-40 mL  5-40 mL IntraVENous Q8H    sodium chloride (NS) flush 5-40 mL  5-40 mL IntraVENous PRN    ondansetron (ZOFRAN ODT) tablet 4 mg  4 mg Oral Q8H PRN    diphenhydrAMINE (BENADRYL) capsule 25 mg  25 mg Oral Q4H PRN    famotidine (PEPCID) tablet 20 mg  20 mg Oral Q12H PRN    cyclobenzaprine (FLEXERIL) tablet 5 mg  5 mg Oral TID PRN    acetaminophen (TYLENOL) tablet 1,000 mg  1,000 mg Oral Q6H PRN       Review of Systems: Per HPI, otherwise negative. Objective:     Physical Exam:  Visit Vitals  BP (!) 104/58   Pulse (!) 115   Temp 98.3 °F (36.8 °C)   Resp 16   Ht 5' 2\" (1.575 m)   Wt 69.4 kg (153 lb)   SpO2 98%   BMI 27.98 kg/m²     SpO2 Readings from Last 6 Encounters:   03/11/22 98%   02/26/22 99%   01/13/22 97%   01/09/22 99%   06/13/20 98%   06/08/20 99%        No intake or output data in the 24 hours ending 03/11/22 0903   General: no distress, comfortable  Skin:  No rash or palpable dermatologic mass lesions  HEENT: Pupils equal, sclera anicteric, oropharynx with no gross lesions  Cardiovascular: No abnormal audible heart sounds, well perfused, no edema  Respiratory:  No abnormal audible breath sounds, normal respiratory effort, no throacic deformity  GI:  Gravid, RUQ TTP, no mass, no free fluid, no rebound or guarding. Musculoskeletal:  No skeletal deformity nor acute arthritis noted.   Neurological:  Motor and sensory function intact in upper extremeties  Psychiatric:  Normal affect, memory intact, appears to have insight into current illness    Laboratory:    Recent Results (from the past 24 hour(s))   CBC WITH AUTOMATED DIFF    Collection Time: 03/10/22 10:24 AM   Result Value Ref Range    WBC 15.1 (H) 3.6 - 11.0 K/uL    RBC 3.77 (L) 3.80 - 5.20 M/uL    HGB 11.1 (L) 11.5 - 16.0 g/dL    HCT 32.9 (L) 35.0 - 47.0 %    MCV 87.3 80.0 - 99.0 FL    MCH 29.4 26.0 - 34.0 PG    MCHC 33.7 30.0 - 36.5 g/dL    RDW 14.1 11.5 - 14.5 %    PLATELET 008 437 - 400 K/uL    MPV 8.6 (L) 8.9 - 12.9 FL    NRBC 0.0 0  WBC    ABSOLUTE NRBC 0.00 0.00 - 0.01 K/uL    NEUTROPHILS 90 (H) 32 - 75 %    LYMPHOCYTES 6 (L) 12 - 49 %    MONOCYTES 3 (L) 5 - 13 %    EOSINOPHILS 0 0 - 7 %    BASOPHILS 0 0 - 1 %    IMMATURE GRANULOCYTES 1 (H) 0.0 - 0.5 %    ABS. NEUTROPHILS 13.5 (H) 1.8 - 8.0 K/UL    ABS. LYMPHOCYTES 0.9 0.8 - 3.5 K/UL    ABS. MONOCYTES 0.5 0.0 - 1.0 K/UL    ABS. EOSINOPHILS 0.0 0.0 - 0.4 K/UL    ABS. BASOPHILS 0.0 0.0 - 0.1 K/UL    ABS. IMM. GRANS. 0.2 (H) 0.00 - 0.04 K/UL    DF AUTOMATED     METABOLIC PANEL, COMPREHENSIVE    Collection Time: 03/10/22 10:24 AM   Result Value Ref Range    Sodium 137 136 - 145 mmol/L    Potassium 4.3 3.5 - 5.1 mmol/L    Chloride 103 97 - 108 mmol/L    CO2 25 21 - 32 mmol/L    Anion gap 9 5 - 15 mmol/L    Glucose 110 (H) 65 - 100 mg/dL    BUN 5 (L) 6 - 20 MG/DL    Creatinine 0.54 (L) 0.55 - 1.02 MG/DL    BUN/Creatinine ratio 9 (L) 12 - 20      GFR est AA >60 >60 ml/min/1.73m2    GFR est non-AA >60 >60 ml/min/1.73m2    Calcium 8.6 8.5 - 10.1 MG/DL    Bilirubin, total 0.4 0.2 - 1.0 MG/DL    ALT (SGPT) 25 12 - 78 U/L    AST (SGOT) 11 (L) 15 - 37 U/L    Alk.  phosphatase 218 (H) 45 - 117 U/L    Protein, total 6.7 6.4 - 8.2 g/dL    Albumin 2.6 (L) 3.5 - 5.0 g/dL    Globulin 4.1 (H) 2.0 - 4.0 g/dL    A-G Ratio 0.6 (L) 1.1 - 2.2     URINALYSIS W/MICROSCOPIC    Collection Time: 03/10/22 10:24 AM   Result Value Ref Range    Color DARK YELLOW      Appearance CLOUDY (A) CLEAR      Specific gravity 1.022 1.003 - 1.030      pH (UA) 8.0 5.0 - 8.0      Protein TRACE (A) NEG mg/dL    Glucose Negative NEG mg/dL    Ketone 40 (A) NEG mg/dL    Bilirubin Negative NEG      Blood Negative NEG      Urobilinogen 1.0 0.2 - 1.0 EU/dL    Nitrites Negative NEG      Leukocyte Esterase SMALL (A) NEG      WBC 0-4 0 - 4 /hpf    RBC 0-5 0 - 5 /hpf    Epithelial cells MODERATE (A) FEW /lpf    Bacteria 1+ (A) NEG /hpf   URINE CULTURE HOLD SAMPLE    Collection Time: 03/10/22 10:24 AM    Specimen: Serum; Urine   Result Value Ref Range    Urine culture hold        Urine on hold in Microbiology dept for 2 days. If unpreserved urine is submitted, it cannot be used for addtional testing after 24 hours, recollection will be required. AMYLASE    Collection Time: 03/10/22 10:24 AM   Result Value Ref Range    Amylase 62 25 - 115 U/L   LIPASE    Collection Time: 03/10/22 10:24 AM   Result Value Ref Range    Lipase 97 73 - 393 U/L   TYPE & SCREEN    Collection Time: 03/10/22 10:34 AM   Result Value Ref Range    Crossmatch Expiration 03/13/2022,2359     ABO/Rh(D) A NEGATIVE     Antibody screen NEG     Comment Previously identified Anti D passively acquired. Unit number [id number]     Blood component type RC LRIR,2     Unit division 00     Status of unit ALLOCATED     Crossmatch result Compatible    EKG, 12 LEAD, INITIAL    Collection Time: 03/10/22  3:59 PM   Result Value Ref Range    Ventricular Rate 127 BPM    Atrial Rate 127 BPM    P-R Interval 126 ms    QRS Duration 82 ms    Q-T Interval 308 ms    QTC Calculation (Bezet) 447 ms    Calculated P Axis 52 degrees    Calculated R Axis 47 degrees    Calculated T Axis 31 degrees    Diagnosis       Sinus tachycardia  Otherwise normal ECG  No previous ECGs available     TSH 3RD GENERATION    Collection Time: 03/10/22  4:06 PM   Result Value Ref Range    TSH 0.27 (L) 0.36 - 3.74 uIU/mL   T4, FREE    Collection Time: 03/11/22  4:37 AM   Result Value Ref Range    T4, Free 1.0 0.8 - 1.5 NG/DL         Assessment/Plan:     Active Problems:    Pregnancy (3/10/2022)      Hypothyroidism (3/10/2022)      Tachycardia (3/10/2022)      Gallstones (3/10/2022)      22 weeks gestation of pregnancy (3/10/2022)         See above narrative for full detail. Tim Narayanan NP    I note plans for cholecystectomy today.   If no apparent complications would have no additional GI recommendations    I have interviewed and examined patient with addendum to note above and formulation care plan to reflect my evaluation    Jeronimo Estes M.D.

## 2022-03-11 NOTE — ANESTHESIA PREPROCEDURE EVALUATION
Relevant Problems   CARDIOVASCULAR   (+) Gestational hypertension   (+) Preeclampsia      ENDOCRINE   (+) Hypothyroidism       Anesthetic History   No history of anesthetic complications            Review of Systems / Medical History  Patient summary reviewed and pertinent labs reviewed    Pulmonary  Within defined limits                 Neuro/Psych         Psychiatric history     Cardiovascular    Hypertension                   GI/Hepatic/Renal         Renal disease: stones       Endo/Other      Hypothyroidism       Other Findings   Comments: 22 weeks pregnant           Physical Exam    Airway  Mallampati: II  TM Distance: 4 - 6 cm  Neck ROM: normal range of motion   Mouth opening: Normal     Cardiovascular  Regular rate and rhythm,  S1 and S2 normal,  no murmur, click, rub, or gallop  Rhythm: regular  Rate: normal         Dental  No notable dental hx       Pulmonary  Breath sounds clear to auscultation               Abdominal  GI exam deferred       Other Findings            Anesthetic Plan    ASA: 2, emergent  Anesthesia type: general          Induction: Intravenous and RSI  Anesthetic plan and risks discussed with: Patient      Had lengthy discussion with patient about the risks of proceeding with surgery and anethesia. Mainly, the risks of  labor and  delivery. Dr. Danielle Erp has told patient the patient that if she doesn't have surgery she will get sicker. The patient states she understands the risks and wishes to proceed. Will check FHTs again after surgery.

## 2022-03-11 NOTE — PROGRESS NOTES
0700: SBAR report received from Jessica Alan RN. 4678: Message left to oncall provider at [phone],  for GI consult. 65: [facility] called for consult. No endocrine provider available for in-hospital consults. 0840: Dr. Monica Christian notified unable to place endocrine consult. 1011: General surgery consult called to Dr. Chrissie Leon at [phone]      [phone]: Pt advised of new orders placed by general surgery. Advised still awaiting consult. Pt states her pain is not caused from her gallbladder. Pt states the flexeril helps with her pain that is in her lower-right back. Pt states she last ate at 0800 and did not experience nausea or vomiting. Will notify Dr. Monica Christian. 1205: Dr. Monica Christian advised pt states her gallbladder is not causing her pain. Pt states her pain is in her lower back towards right side and feels like it may be muscle strain. Pt states her pain is not like a gallbladder attack that she has previously experienced. MD advised pt was ordered NPO and Zosyn at this time. MD notified pt states the flexeril does help with her pain and that she has no nausea or vomiting. Dr. Monica Christian will come to re-evaluate patient this afternoon. 1435: SBAR report given to [employer], in pre-op. 1446: Pt wiped down with CHG wipes. 1459: Pt taken to pre-op via wheelchair.

## 2022-03-12 LAB
ALBUMIN SERPL-MCNC: 2.6 G/DL (ref 3.5–5)
ALBUMIN/GLOB SERPL: 0.6 {RATIO} (ref 1.1–2.2)
ALP SERPL-CCNC: 167 U/L (ref 45–117)
ALT SERPL-CCNC: 23 U/L (ref 12–78)
AST SERPL-CCNC: 27 U/L (ref 15–37)
BASOPHILS # BLD: 0 K/UL (ref 0–0.1)
BASOPHILS NFR BLD: 0 % (ref 0–1)
BILIRUB DIRECT SERPL-MCNC: 0.2 MG/DL (ref 0–0.2)
BILIRUB SERPL-MCNC: 0.6 MG/DL (ref 0.2–1)
DIFFERENTIAL METHOD BLD: ABNORMAL
EOSINOPHIL # BLD: 0 K/UL (ref 0–0.4)
EOSINOPHIL NFR BLD: 0 % (ref 0–7)
ERYTHROCYTE [DISTWIDTH] IN BLOOD BY AUTOMATED COUNT: 14.3 % (ref 11.5–14.5)
GLOBULIN SER CALC-MCNC: 4.5 G/DL (ref 2–4)
HCT VFR BLD AUTO: 28.4 % (ref 35–47)
HGB BLD-MCNC: 9.4 G/DL (ref 11.5–16)
IMM GRANULOCYTES # BLD AUTO: 0.1 K/UL (ref 0–0.04)
IMM GRANULOCYTES NFR BLD AUTO: 1 % (ref 0–0.5)
LYMPHOCYTES # BLD: 0.9 K/UL (ref 0.8–3.5)
LYMPHOCYTES NFR BLD: 6 % (ref 12–49)
MCH RBC QN AUTO: 29.7 PG (ref 26–34)
MCHC RBC AUTO-ENTMCNC: 33.1 G/DL (ref 30–36.5)
MCV RBC AUTO: 89.9 FL (ref 80–99)
MONOCYTES # BLD: 0.3 K/UL (ref 0–1)
MONOCYTES NFR BLD: 2 % (ref 5–13)
NEUTS SEG # BLD: 12.9 K/UL (ref 1.8–8)
NEUTS SEG NFR BLD: 91 % (ref 32–75)
NRBC # BLD: 0 K/UL (ref 0–0.01)
NRBC BLD-RTO: 0 PER 100 WBC
PLATELET # BLD AUTO: 373 K/UL (ref 150–400)
PMV BLD AUTO: 8.6 FL (ref 8.9–12.9)
PROT SERPL-MCNC: 7.1 G/DL (ref 6.4–8.2)
RBC # BLD AUTO: 3.16 M/UL (ref 3.8–5.2)
RBC MORPH BLD: ABNORMAL
WBC # BLD AUTO: 14.2 K/UL (ref 3.6–11)

## 2022-03-12 PROCEDURE — 74011250637 HC RX REV CODE- 250/637: Performed by: SURGERY

## 2022-03-12 PROCEDURE — 77030027138 HC INCENT SPIROMETER -A

## 2022-03-12 PROCEDURE — 80076 HEPATIC FUNCTION PANEL: CPT

## 2022-03-12 PROCEDURE — 77030038269 HC DRN EXT URIN PURWCK BARD -A

## 2022-03-12 PROCEDURE — 2709999900 HC NON-CHARGEABLE SUPPLY

## 2022-03-12 PROCEDURE — 74011000250 HC RX REV CODE- 250: Performed by: STUDENT IN AN ORGANIZED HEALTH CARE EDUCATION/TRAINING PROGRAM

## 2022-03-12 PROCEDURE — 65270000029 HC RM PRIVATE

## 2022-03-12 PROCEDURE — 85025 COMPLETE CBC W/AUTO DIFF WBC: CPT

## 2022-03-12 PROCEDURE — 74011250637 HC RX REV CODE- 250/637: Performed by: STUDENT IN AN ORGANIZED HEALTH CARE EDUCATION/TRAINING PROGRAM

## 2022-03-12 PROCEDURE — 74011250636 HC RX REV CODE- 250/636: Performed by: SURGERY

## 2022-03-12 PROCEDURE — 74011000250 HC RX REV CODE- 250: Performed by: SURGERY

## 2022-03-12 PROCEDURE — 74011000258 HC RX REV CODE- 258: Performed by: SURGERY

## 2022-03-12 PROCEDURE — 74011250636 HC RX REV CODE- 250/636: Performed by: OBSTETRICS & GYNECOLOGY

## 2022-03-12 PROCEDURE — 36415 COLL VENOUS BLD VENIPUNCTURE: CPT

## 2022-03-12 RX ORDER — ACETAMINOPHEN 500 MG
1000 TABLET ORAL EVERY 6 HOURS
Status: DISCONTINUED | OUTPATIENT
Start: 2022-03-12 | End: 2022-03-14 | Stop reason: HOSPADM

## 2022-03-12 RX ORDER — KETOROLAC TROMETHAMINE 30 MG/ML
30 INJECTION, SOLUTION INTRAMUSCULAR; INTRAVENOUS
Status: COMPLETED | OUTPATIENT
Start: 2022-03-12 | End: 2022-03-12

## 2022-03-12 RX ORDER — SODIUM CHLORIDE 0.9 % (FLUSH) 0.9 %
5-40 SYRINGE (ML) INJECTION AS NEEDED
Status: DISCONTINUED | OUTPATIENT
Start: 2022-03-12 | End: 2022-03-14 | Stop reason: HOSPADM

## 2022-03-12 RX ORDER — SODIUM CHLORIDE 0.9 % (FLUSH) 0.9 %
5-40 SYRINGE (ML) INJECTION EVERY 8 HOURS
Status: DISCONTINUED | OUTPATIENT
Start: 2022-03-12 | End: 2022-03-14 | Stop reason: HOSPADM

## 2022-03-12 RX ADMIN — SODIUM CHLORIDE, POTASSIUM CHLORIDE, SODIUM LACTATE AND CALCIUM CHLORIDE 125 ML/HR: 600; 310; 30; 20 INJECTION, SOLUTION INTRAVENOUS at 11:59

## 2022-03-12 RX ADMIN — OXYCODONE 5 MG: 5 TABLET ORAL at 22:45

## 2022-03-12 RX ADMIN — SODIUM CHLORIDE, PRESERVATIVE FREE 10 ML: 5 INJECTION INTRAVENOUS at 20:04

## 2022-03-12 RX ADMIN — MORPHINE SULFATE 2 MG: 2 INJECTION, SOLUTION INTRAMUSCULAR; INTRAVENOUS at 00:09

## 2022-03-12 RX ADMIN — ACETAMINOPHEN 1000 MG: 500 TABLET ORAL at 20:04

## 2022-03-12 RX ADMIN — ACETAMINOPHEN 1000 MG: 500 TABLET ORAL at 08:17

## 2022-03-12 RX ADMIN — OXYCODONE 5 MG: 5 TABLET ORAL at 18:17

## 2022-03-12 RX ADMIN — OXYCODONE 5 MG: 5 TABLET ORAL at 03:24

## 2022-03-12 RX ADMIN — ONDANSETRON 4 MG: 4 TABLET, ORALLY DISINTEGRATING ORAL at 08:18

## 2022-03-12 RX ADMIN — MORPHINE SULFATE 2 MG: 2 INJECTION, SOLUTION INTRAMUSCULAR; INTRAVENOUS at 06:20

## 2022-03-12 RX ADMIN — KETOROLAC TROMETHAMINE 30 MG: 30 INJECTION, SOLUTION INTRAMUSCULAR; INTRAVENOUS at 09:46

## 2022-03-12 RX ADMIN — SODIUM CHLORIDE, PRESERVATIVE FREE 10 ML: 5 INJECTION INTRAVENOUS at 03:23

## 2022-03-12 RX ADMIN — SODIUM CHLORIDE, PRESERVATIVE FREE 10 ML: 5 INJECTION INTRAVENOUS at 14:12

## 2022-03-12 RX ADMIN — FAMOTIDINE 20 MG: 20 TABLET ORAL at 18:17

## 2022-03-12 RX ADMIN — MORPHINE SULFATE 2 MG: 2 INJECTION, SOLUTION INTRAMUSCULAR; INTRAVENOUS at 14:12

## 2022-03-12 RX ADMIN — PIPERACILLIN AND TAZOBACTAM 3.38 G: 3; .375 INJECTION, POWDER, LYOPHILIZED, FOR SOLUTION INTRAVENOUS at 20:04

## 2022-03-12 RX ADMIN — PIPERACILLIN AND TAZOBACTAM 3.38 G: 3; .375 INJECTION, POWDER, LYOPHILIZED, FOR SOLUTION INTRAVENOUS at 12:37

## 2022-03-12 RX ADMIN — ACETAMINOPHEN 1000 MG: 500 TABLET ORAL at 14:12

## 2022-03-12 RX ADMIN — PIPERACILLIN AND TAZOBACTAM 3.38 G: 3; .375 INJECTION, POWDER, LYOPHILIZED, FOR SOLUTION INTRAVENOUS at 03:23

## 2022-03-12 RX ADMIN — OXYCODONE 5 MG: 5 TABLET ORAL at 08:17

## 2022-03-12 RX ADMIN — SODIUM CHLORIDE, POTASSIUM CHLORIDE, SODIUM LACTATE AND CALCIUM CHLORIDE 125 ML/HR: 600; 310; 30; 20 INJECTION, SOLUTION INTRAVENOUS at 03:25

## 2022-03-12 NOTE — PROGRESS NOTES
1930  Bedside and Verbal shift change report given to JAYNE Mckeon (oncoming nurse) by Zo Rousseau RN (offgoing nurse). Report included the following information SBAR, Kardex, Intake/Output, MAR and Recent Results.

## 2022-03-12 NOTE — PROGRESS NOTES
Patient stated she is not feeling better after surgery stated she is still have lots of lower back pain and right side pain advised to talk to doctor

## 2022-03-12 NOTE — PROGRESS NOTES
Problem: Pain  Goal: *Control of Pain  Outcome: Progressing Towards Goal  Goal: *PALLIATIVE CARE:  Alleviation of Pain  Outcome: Progressing Towards Goal     Problem: Patient Education: Go to Patient Education Activity  Goal: Patient/Family Education  Outcome: Progressing Towards Goal     Problem: Falls - Risk of  Goal: *Absence of Falls  Description: Document Sofia Fothergill Fall Risk and appropriate interventions in the flowsheet.   Outcome: Progressing Towards Goal  Note: Fall Risk Interventions:            Medication Interventions: Evaluate medications/consider consulting pharmacy,Patient to call before getting OOB,Teach patient to arise slowly,Utilize gait belt for transfers/ambulation

## 2022-03-12 NOTE — PERIOP NOTES
TRANSFER - OUT REPORT:    Verbal report given to Janell on Beverley Eduardo  being transferred to [address] [place] [address] for routine post - op       Report consisted of patients Situation, Background, Assessment and   Recommendations(SBAR). Information from the following report(s) SBAR and MAR was reviewed with the receiving nurse. Opportunity for questions and clarification was provided.       Patient transported with:   O2 @ 2 liters  Registered Nurse

## 2022-03-12 NOTE — PERIOP NOTES
Spoke with nurse that MD requested fetal heart tones to be done every 6 hours.  Surgical incision checked with float pool nurse and JAYNE Knight on hand off

## 2022-03-12 NOTE — PROGRESS NOTES
TRANSFER - IN REPORT:    Verbal report received from [facility] RN PACU (name) on Billie Wallace  being received from [address] [place] [address] RN(unit) for routine post - op      Report consisted of patients Situation, Background, Assessment and   Recommendations(SBAR). Information from the following report(s) SBAR, Kardex, OR Summary, Procedure Summary, Intake/Output, MAR, Recent Results, Med Rec Status and Cardiac Rhythm Sinus Tachy was reviewed with the receiving nurse. Opportunity for questions and clarification was provided. Assessment completed upon patients arrival to unit and care assumed.

## 2022-03-12 NOTE — PROGRESS NOTES
Bedside, Verbal and Written shift change report given to [address] (oncoming nurse) by Noah Moody (offgoing nurse). Report included the following information SBAR, Kardex, ED Summary, OR Summary, Intake/Output, MAR, Recent Results and Med Rec Status.

## 2022-03-12 NOTE — PROGRESS NOTES
General Surgery Daily Progress Note    Patient: Nasreen Santana MRN: [id number]  SSN: [id number]    YOB: 1990  Age: 27 y.o. Sex: female      Admit Date: 3/10/2022    POD 1 Day Post-Op    Procedure: Procedure(s):  CHOLECYSTECTOMY ROBOTIC ASSISTED    Subjective:   Reports pain in back and upper abdomen, mainly RUQ area  Has nausea, no vomiting    Current Facility-Administered Medications   Medication Dose Route Frequency    sodium chloride (NS) flush 5-40 mL  5-40 mL IntraVENous Q8H    sodium chloride (NS) flush 5-40 mL  5-40 mL IntraVENous PRN    piperacillin-tazobactam (ZOSYN) 3.375 g in 0.9% sodium chloride (MBP/ADV) 100 mL MBP  3.375 g IntraVENous Q8H    lactated Ringers infusion  125 mL/hr IntraVENous CONTINUOUS    oxyCODONE IR (ROXICODONE) tablet 5 mg  5 mg Oral Q4H PRN    morphine injection 2 mg  2 mg IntraVENous Q2H PRN    lactated Ringers infusion 1,000 mL  1,000 mL IntraVENous CONTINUOUS    sodium chloride (NS) flush 5-40 mL  5-40 mL IntraVENous Q8H    sodium chloride (NS) flush 5-40 mL  5-40 mL IntraVENous PRN    ondansetron (ZOFRAN ODT) tablet 4 mg  4 mg Oral Q8H PRN    diphenhydrAMINE (BENADRYL) capsule 25 mg  25 mg Oral Q4H PRN    famotidine (PEPCID) tablet 20 mg  20 mg Oral Q12H PRN    cyclobenzaprine (FLEXERIL) tablet 5 mg  5 mg Oral TID PRN    acetaminophen (TYLENOL) tablet 1,000 mg  1,000 mg Oral Q6H PRN        Objective:   No intake/output data recorded. 03/10 1901 - 03/12 0700  In: 2500 [I.V.:2500]  Out: 2080 [Urine:1950; Drains:130]  Patient Vitals for the past 8 hrs:   BP Temp Pulse Resp SpO2   03/12/22 0548 126/78 98.6 °F (37 °C) (!) 124 16 98 %   03/12/22 0348 121/77 97.9 °F (36.6 °C) (!) 123 20 97 %   03/12/22 0120 121/77 98.2 °F (36.8 °C) (!) 118 16 96 %       Physical Exam:  General: Alert, cooperative, NAD  Lungs: Unlabored  Abdomen: Soft, appropriately TTP diffusely. TONJA in place and serosanguinous.   Incisions C/D/I  Extremities: Warm, moves all, no edema  Skin:  Warm and dry, no rash    Labs:   Recent Labs     03/12/22  0012   WBC 14.2*   HGB 9.4*   HCT 28.4*        Recent Labs     03/12/22  0012 03/10/22  1024 03/10/22  1024   NA  --   --  137   K  --   --  4.3   CL  --   --  103   CO2  --   --  25   GLU  --   --  110*   BUN  --   --  5*   CREA  --   --  0.54*   CA  --   --  8.6   ALB 2.6*   < > 2.6*   TBILI 0.6   < > 0.4   ALT 23   < > 25    < > = values in this interval not displayed.        Assessment / Plan:     POD 1 Day Post-Op    Procedure: Procedure(s):  CHOLECYSTECTOMY ROBOTIC ASSISTED  Recommend to go very slow with food  Explained that she needs to do IS while awake  Explained that she needs to move around today and remove the urine tube and use the bathroom  Continue TONJA  I explained that she is high risk to have an abscess form, and so will continue TONJA drain and abx for this reason  Explained possibility of bile leak, and so will keep TONJA at this time  Recommend around the clock Tylenol   Would appreciate if OB can comment of having patient get a one time does of Toradol, 15mg or 30mg if ok per them           Johana Willingham MD

## 2022-03-12 NOTE — ANESTHESIA POSTPROCEDURE EVALUATION
Procedure(s):  CHOLECYSTECTOMY ROBOTIC ASSISTED. general    Anesthesia Post Evaluation        Patient location during evaluation: PACU  Patient participation: complete - patient participated  Level of consciousness: awake and alert  Pain score: 0  Pain management: adequate  Airway patency: patent  Anesthetic complications: no  Cardiovascular status: acceptable  Respiratory status: acceptable  Hydration status: acceptable  Post anesthesia nausea and vomiting:  none  Final Post Anesthesia Temperature Assessment:  Normothermia (36.0-37.5 degrees C)      INITIAL Post-op Vital signs:   Vitals Value Taken Time   /75 03/11/22 1955   Temp 36.7 °C (98 °F) 03/11/22 1830   Pulse 114 03/11/22 1958   Resp 17 03/11/22 1958   SpO2 96 % 03/11/22 1958   Vitals shown include unvalidated device data.

## 2022-03-12 NOTE — PROGRESS NOTES
Called L&D spoke with pieter Salcido about patient needing q6 fetal monitor checks she doesn't know who is coming but she will let charge nurse know.

## 2022-03-13 LAB
ALBUMIN SERPL-MCNC: 1.8 G/DL (ref 3.5–5)
ALBUMIN/GLOB SERPL: 0.5 {RATIO} (ref 1.1–2.2)
ALP SERPL-CCNC: 142 U/L (ref 45–117)
ALT SERPL-CCNC: 16 U/L (ref 12–78)
ANION GAP SERPL CALC-SCNC: 9 MMOL/L (ref 5–15)
AST SERPL-CCNC: 14 U/L (ref 15–37)
BASOPHILS # BLD: 0 K/UL (ref 0–0.1)
BASOPHILS NFR BLD: 0 % (ref 0–1)
BILIRUB SERPL-MCNC: 0.5 MG/DL (ref 0.2–1)
BUN SERPL-MCNC: 5 MG/DL (ref 6–20)
BUN/CREAT SERPL: 11 (ref 12–20)
CALCIUM SERPL-MCNC: 7.7 MG/DL (ref 8.5–10.1)
CHLORIDE SERPL-SCNC: 108 MMOL/L (ref 97–108)
CO2 SERPL-SCNC: 20 MMOL/L (ref 21–32)
CREAT SERPL-MCNC: 0.44 MG/DL (ref 0.55–1.02)
DIFFERENTIAL METHOD BLD: ABNORMAL
EOSINOPHIL # BLD: 0.1 K/UL (ref 0–0.4)
EOSINOPHIL NFR BLD: 1 % (ref 0–7)
ERYTHROCYTE [DISTWIDTH] IN BLOOD BY AUTOMATED COUNT: 14.6 % (ref 11.5–14.5)
GLOBULIN SER CALC-MCNC: 3.5 G/DL (ref 2–4)
GLUCOSE SERPL-MCNC: 104 MG/DL (ref 65–100)
HCT VFR BLD AUTO: 22.4 % (ref 35–47)
HGB BLD-MCNC: 7.4 G/DL (ref 11.5–16)
IMM GRANULOCYTES # BLD AUTO: 0.1 K/UL (ref 0–0.04)
IMM GRANULOCYTES NFR BLD AUTO: 1 % (ref 0–0.5)
LYMPHOCYTES # BLD: 0.9 K/UL (ref 0.8–3.5)
LYMPHOCYTES NFR BLD: 12 % (ref 12–49)
MCH RBC QN AUTO: 29.4 PG (ref 26–34)
MCHC RBC AUTO-ENTMCNC: 33 G/DL (ref 30–36.5)
MCV RBC AUTO: 88.9 FL (ref 80–99)
MONOCYTES # BLD: 0.3 K/UL (ref 0–1)
MONOCYTES NFR BLD: 4 % (ref 5–13)
NEUTS SEG # BLD: 6.2 K/UL (ref 1.8–8)
NEUTS SEG NFR BLD: 82 % (ref 32–75)
NRBC # BLD: 0 K/UL (ref 0–0.01)
NRBC BLD-RTO: 0 PER 100 WBC
PLATELET # BLD AUTO: 312 K/UL (ref 150–400)
PMV BLD AUTO: 8.6 FL (ref 8.9–12.9)
POTASSIUM SERPL-SCNC: 3.5 MMOL/L (ref 3.5–5.1)
PROT SERPL-MCNC: 5.3 G/DL (ref 6.4–8.2)
RBC # BLD AUTO: 2.52 M/UL (ref 3.8–5.2)
SODIUM SERPL-SCNC: 137 MMOL/L (ref 136–145)
WBC # BLD AUTO: 7.6 K/UL (ref 3.6–11)

## 2022-03-13 PROCEDURE — 74011250637 HC RX REV CODE- 250/637: Performed by: SURGERY

## 2022-03-13 PROCEDURE — 65270000029 HC RM PRIVATE

## 2022-03-13 PROCEDURE — 74011250636 HC RX REV CODE- 250/636: Performed by: SURGERY

## 2022-03-13 PROCEDURE — 85025 COMPLETE CBC W/AUTO DIFF WBC: CPT

## 2022-03-13 PROCEDURE — 74011000258 HC RX REV CODE- 258: Performed by: SURGERY

## 2022-03-13 PROCEDURE — 74011000250 HC RX REV CODE- 250: Performed by: SURGERY

## 2022-03-13 PROCEDURE — 80053 COMPREHEN METABOLIC PANEL: CPT

## 2022-03-13 PROCEDURE — 36415 COLL VENOUS BLD VENIPUNCTURE: CPT

## 2022-03-13 PROCEDURE — 74011000250 HC RX REV CODE- 250: Performed by: STUDENT IN AN ORGANIZED HEALTH CARE EDUCATION/TRAINING PROGRAM

## 2022-03-13 RX ORDER — KETOROLAC TROMETHAMINE 30 MG/ML
30 INJECTION, SOLUTION INTRAMUSCULAR; INTRAVENOUS
Status: COMPLETED | OUTPATIENT
Start: 2022-03-13 | End: 2022-03-13

## 2022-03-13 RX ADMIN — OXYCODONE 5 MG: 5 TABLET ORAL at 11:41

## 2022-03-13 RX ADMIN — SODIUM CHLORIDE, PRESERVATIVE FREE 10 ML: 5 INJECTION INTRAVENOUS at 13:42

## 2022-03-13 RX ADMIN — ACETAMINOPHEN 1000 MG: 500 TABLET ORAL at 03:53

## 2022-03-13 RX ADMIN — SODIUM CHLORIDE, POTASSIUM CHLORIDE, SODIUM LACTATE AND CALCIUM CHLORIDE 125 ML/HR: 600; 310; 30; 20 INJECTION, SOLUTION INTRAVENOUS at 03:55

## 2022-03-13 RX ADMIN — SODIUM CHLORIDE, POTASSIUM CHLORIDE, SODIUM LACTATE AND CALCIUM CHLORIDE 75 ML/HR: 600; 310; 30; 20 INJECTION, SOLUTION INTRAVENOUS at 23:47

## 2022-03-13 RX ADMIN — KETOROLAC TROMETHAMINE 30 MG: 30 INJECTION, SOLUTION INTRAMUSCULAR; INTRAVENOUS at 12:40

## 2022-03-13 RX ADMIN — ACETAMINOPHEN 1000 MG: 500 TABLET ORAL at 07:49

## 2022-03-13 RX ADMIN — OXYCODONE 5 MG: 5 TABLET ORAL at 07:49

## 2022-03-13 RX ADMIN — PIPERACILLIN AND TAZOBACTAM 3.38 G: 3; .375 INJECTION, POWDER, LYOPHILIZED, FOR SOLUTION INTRAVENOUS at 20:26

## 2022-03-13 RX ADMIN — PIPERACILLIN AND TAZOBACTAM 3.38 G: 3; .375 INJECTION, POWDER, LYOPHILIZED, FOR SOLUTION INTRAVENOUS at 03:52

## 2022-03-13 RX ADMIN — SODIUM CHLORIDE, PRESERVATIVE FREE 10 ML: 5 INJECTION INTRAVENOUS at 06:03

## 2022-03-13 RX ADMIN — SODIUM CHLORIDE, PRESERVATIVE FREE 10 ML: 5 INJECTION INTRAVENOUS at 23:12

## 2022-03-13 RX ADMIN — SODIUM CHLORIDE, PRESERVATIVE FREE 10 ML: 5 INJECTION INTRAVENOUS at 23:11

## 2022-03-13 RX ADMIN — SODIUM CHLORIDE, POTASSIUM CHLORIDE, SODIUM LACTATE AND CALCIUM CHLORIDE 125 ML/HR: 600; 310; 30; 20 INJECTION, SOLUTION INTRAVENOUS at 12:04

## 2022-03-13 RX ADMIN — ACETAMINOPHEN 1000 MG: 500 TABLET ORAL at 13:42

## 2022-03-13 RX ADMIN — OXYCODONE 5 MG: 5 TABLET ORAL at 18:28

## 2022-03-13 RX ADMIN — SODIUM CHLORIDE, PRESERVATIVE FREE 10 ML: 5 INJECTION INTRAVENOUS at 13:41

## 2022-03-13 RX ADMIN — OXYCODONE 5 MG: 5 TABLET ORAL at 03:52

## 2022-03-13 RX ADMIN — OXYCODONE 5 MG: 5 TABLET ORAL at 23:04

## 2022-03-13 RX ADMIN — ACETAMINOPHEN 1000 MG: 500 TABLET ORAL at 20:24

## 2022-03-13 RX ADMIN — PIPERACILLIN AND TAZOBACTAM 3.38 G: 3; .375 INJECTION, POWDER, LYOPHILIZED, FOR SOLUTION INTRAVENOUS at 11:37

## 2022-03-13 NOTE — PROGRESS NOTES
Gastrointestinal Progress Note    3/13/2022    Admit Date: 3/10/2022    Subjective:     Eating without provocation of pain, nausea, vomiting, bleeding. Current Facility-Administered Medications   Medication Dose Route Frequency    sodium chloride (NS) flush 5-40 mL  5-40 mL IntraVENous Q8H    sodium chloride (NS) flush 5-40 mL  5-40 mL IntraVENous PRN    acetaminophen (TYLENOL) tablet 1,000 mg  1,000 mg Oral Q6H    piperacillin-tazobactam (ZOSYN) 3.375 g in 0.9% sodium chloride (MBP/ADV) 100 mL MBP  3.375 g IntraVENous Q8H    lactated Ringers infusion  75 mL/hr IntraVENous CONTINUOUS    oxyCODONE IR (ROXICODONE) tablet 5 mg  5 mg Oral Q4H PRN    morphine injection 2 mg  2 mg IntraVENous Q2H PRN    lactated Ringers infusion 1,000 mL  1,000 mL IntraVENous CONTINUOUS    sodium chloride (NS) flush 5-40 mL  5-40 mL IntraVENous Q8H    sodium chloride (NS) flush 5-40 mL  5-40 mL IntraVENous PRN    ondansetron (ZOFRAN ODT) tablet 4 mg  4 mg Oral Q8H PRN    diphenhydrAMINE (BENADRYL) capsule 25 mg  25 mg Oral Q4H PRN    famotidine (PEPCID) tablet 20 mg  20 mg Oral Q12H PRN    cyclobenzaprine (FLEXERIL) tablet 5 mg  5 mg Oral TID PRN        Objective:     Blood pressure 108/74, pulse 98, temperature 97.6 °F (36.4 °C), resp. rate 17, height 5' 2\" (1.575 m), weight 69.4 kg (153 lb), SpO2 98 %, not currently breastfeeding.    03/13 0701 - 03/13 1900  In: -   Out: 40 [Drains:40]    03/11 1901 - 03/13 0700  In: 3345.4 [P.O.:760;  I.V.:2585.4]  Out: 3596 [Urine:2300; Drains:255]    EXAM:  GENERAL: alert, cooperative, no distress, HEART: regular rate and rhythm, LUNGS: chest clear, no wheezing, rales, normal symmetric air entry, ABDOMEN:  Bowel sounds are normal, liver is not enlarged, spleen is not enlarged and EXTREMITY: no edema      Data Review    Recent Results (from the past 24 hour(s))   CBC WITH AUTOMATED DIFF    Collection Time: 03/13/22  3:58 AM   Result Value Ref Range    WBC 7.6 3.6 - 11.0 K/uL    RBC 2.52 (L) 3.80 - 5.20 M/uL    HGB 7.4 (L) 11.5 - 16.0 g/dL    HCT 22.4 (L) 35.0 - 47.0 %    MCV 88.9 80.0 - 99.0 FL    MCH 29.4 26.0 - 34.0 PG    MCHC 33.0 30.0 - 36.5 g/dL    RDW 14.6 (H) 11.5 - 14.5 %    PLATELET 833 863 - 185 K/uL    MPV 8.6 (L) 8.9 - 12.9 FL    NRBC 0.0 0  WBC    ABSOLUTE NRBC 0.00 0.00 - 0.01 K/uL    NEUTROPHILS 82 (H) 32 - 75 %    LYMPHOCYTES 12 12 - 49 %    MONOCYTES 4 (L) 5 - 13 %    EOSINOPHILS 1 0 - 7 %    BASOPHILS 0 0 - 1 %    IMMATURE GRANULOCYTES 1 (H) 0.0 - 0.5 %    ABS. NEUTROPHILS 6.2 1.8 - 8.0 K/UL    ABS. LYMPHOCYTES 0.9 0.8 - 3.5 K/UL    ABS. MONOCYTES 0.3 0.0 - 1.0 K/UL    ABS. EOSINOPHILS 0.1 0.0 - 0.4 K/UL    ABS. BASOPHILS 0.0 0.0 - 0.1 K/UL    ABS. IMM. GRANS. 0.1 (H) 0.00 - 0.04 K/UL    DF AUTOMATED     METABOLIC PANEL, COMPREHENSIVE    Collection Time: 03/13/22  3:58 AM   Result Value Ref Range    Sodium 137 136 - 145 mmol/L    Potassium 3.5 3.5 - 5.1 mmol/L    Chloride 108 97 - 108 mmol/L    CO2 20 (L) 21 - 32 mmol/L    Anion gap 9 5 - 15 mmol/L    Glucose 104 (H) 65 - 100 mg/dL    BUN 5 (L) 6 - 20 MG/DL    Creatinine 0.44 (L) 0.55 - 1.02 MG/DL    BUN/Creatinine ratio 11 (L) 12 - 20      GFR est AA >60 >60 ml/min/1.73m2    GFR est non-AA >60 >60 ml/min/1.73m2    Calcium 7.7 (L) 8.5 - 10.1 MG/DL    Bilirubin, total 0.5 0.2 - 1.0 MG/DL    ALT (SGPT) 16 12 - 78 U/L    AST (SGOT) 14 (L) 15 - 37 U/L    Alk. phosphatase 142 (H) 45 - 117 U/L    Protein, total 5.3 (L) 6.4 - 8.2 g/dL    Albumin 1.8 (L) 3.5 - 5.0 g/dL    Globulin 3.5 2.0 - 4.0 g/dL    A-G Ratio 0.5 (L) 1.1 - 2.2         Assessment:     Active Problems:    Pregnancy (3/10/2022)      Hypothyroidism (3/10/2022)      Tachycardia (3/10/2022)      Gallstones (3/10/2022)      22 weeks gestation of pregnancy (3/10/2022)        Plan:     1.   We will see again as needed

## 2022-03-13 NOTE — PROGRESS NOTES
OB Note    Bard Code  [id number]  1990  G14 [phone] 22w4d      In to check on patient. Feeling as expected s/p surgery. Has tolerated sips of clear fluids. \"Taking it slow\" per surgery recommendations. Pain has been \"mostly\" controlled and was much better after toradol. Has not been OOB. Has felt FM. Denies ctx/vt/lof. FHT noted this AM.  Plans for repeat in the evening. Will continue to follow.         Nancy Shafer MD  [facility]

## 2022-03-13 NOTE — PROGRESS NOTES
OB Note    Allie Clancy  [id number]  1990   22w4d      S:  States she feels somewhat better than yesterday. \"Taking it easy\" with diet still. Burping but not passing much gas. Has been able to get OOB to the bedside commode but is not comfortable. Good FM. No vb/lof/ctx. O:    Vitals:    22 2302 22 0306 22 0801 22 1105   BP: 102/61 105/65 104/65 105/65   Pulse: (!) 113 (!) 114 (!) 122 (!) 112   Resp:  16   Temp: 97.7 °F (36.5 °C) 98.9 °F (37.2 °C) 98.6 °F (37 °C) 98.6 °F (37 °C)   SpO2: 96% 97% 94% 97%   Weight:       Height:         Gen - appears uncomfortable. Abd - distended. Incisions well healed. TONJA drain with serosanguinous drainage. Fundus firm above the umbilicus. Ext - WWP. Lab Results   Component Value Date/Time    WBC 7.6 2022 02:58 AM    HGB 7.4 (L) 2022 02:58 AM    HCT 22.4 (L) 2022 02:58 AM    PLATELET 233  02:58 AM    MCV 88.9 2022 02:58 AM    Hgb, External 11.7 2019 12:00 AM    Hct, External 35.2 2019 12:00 AM     Lab Results   Component Value Date/Time    Sodium 137 2022 02:58 AM    Potassium 3.5 2022 02:58 AM    Chloride 108 2022 02:58 AM    CO2 20 (L) 2022 02:58 AM    Anion gap 9 2022 02:58 AM    Glucose 104 (H) 2022 02:58 AM    BUN 5 (L) 2022 02:58 AM    Creatinine 0.44 (L) 2022 02:58 AM    BUN/Creatinine ratio 11 (L) 2022 02:58 AM    GFR est AA >60 2022 02:58 AM    GFR est non-AA >60 2022 02:58 AM    Calcium 7.7 (L) 2022 02:58 AM    Bilirubin, total 0.5 2022 02:58 AM    Alk.  phosphatase 142 (H) 2022 02:58 AM    Protein, total 5.3 (L) 2022 02:58 AM    Albumin 1.8 (L) 2022 02:58 AM    Globulin 3.5 2022 02:58 AM    A-G Ratio 0.5 (L) 2022 02:58 AM    ALT (SGPT) 16 2022 02:58 AM    AST (SGOT) 14 (L) 2022 02:58 AM     A/p:   POD#2 s/p LSC partial cholecystectomy Continue postop care per gen surgery. OB stable. FHT daily at this point.         Ashok Elizabeth MD  [facility]

## 2022-03-13 NOTE — PROGRESS NOTES
Problem: Pain  Goal: *Control of Pain  Outcome: Progressing Towards Goal  Goal: *PALLIATIVE CARE:  Alleviation of Pain  Outcome: Progressing Towards Goal     Problem: Patient Education: Go to Patient Education Activity  Goal: Patient/Family Education  Outcome: Progressing Towards Goal     Problem: Falls - Risk of  Goal: *Absence of Falls  Description: Document Reg Anastaciochristopherroney Fall Risk and appropriate interventions in the flowsheet.   Outcome: Progressing Towards Goal  Note: Fall Risk Interventions:            Medication Interventions: Patient to call before getting OOB,Teach patient to arise slowly,Utilize gait belt for transfers/ambulation,Bed/chair exit alarm                   Problem: Patient Education: Go to Patient Education Activity  Goal: Patient/Family Education  Outcome: Progressing Towards Goal     Problem: Nausea/Vomiting (Adult)  Goal: *Absence of nausea/vomiting  Outcome: Progressing Towards Goal     Problem: Patient Education: Go to Patient Education Activity  Goal: Patient/Family Education  Outcome: Progressing Towards Goal     Problem: Patient Education: Go to Patient Education Activity  Goal: Patient/Family Education  Outcome: Progressing Towards Goal     Problem: Surgical Pathway Day of Surgery  Goal: Activity/Safety  Outcome: Progressing Towards Goal  Goal: Consults, if ordered  Outcome: Progressing Towards Goal

## 2022-03-13 NOTE — PROGRESS NOTES
0700 Shift report received from [place], [address] [place] [address] In to check on pt. Pt requesting fork for breakfast tray. Pt reports pain. PRN administered. Pt swallows pills with some slight gagging but states this is normal for her. TONJA drain charged and patent. Lap sites clean, dry and intact. Pt denies nausea. IS performed. Pt HR elevated. See flowsheet. Pt states likely related to her hyperthyroidism. Pt states \"endo will not see me in the hospital because I am not a current patient\". Pt with call bell in reach and verbalized use of call bell for assistance. 0900 Pt reports leakage from drain site. Changed dressing. Ambulated pt to bedside commode. Pt up in chair with call bell and personal items within reach. 1015 In to check on pt. Pt in chair watching TV and does not have any requests at this time. Call bell in chair with pt.     1130 In to check on pt and hang abx. Pt requesting bathroom and pain medication. PRN administered. Pt ambulated to bedside commode and returned to bed.     1204 Pt IV bag changed. MD at bedside. Orders placed. 1340 In to check on pt. Pt reports leaking from TONJA drain dressing. Changed dressing. Pt given scheduled medications. Pt reports pain controlled. No other requests at this time. 1600  at bedside.  assisting pt to chair.  emptied drain. See flowsheet for output. Pt reports pain controlled. PCT gave  and pt items for self care. 18 Pt  at bedside. Pt reports pain controlled. Dressing has drainage but is intact. Pt without complaints at this time. 1815 Changed dressing. Pt reports pain increasing. PRN administered. 1900 Bedside shift change report given to María Choudhury RN (oncoming nurse) by Morgan Olivo RN (offgoing nurse). Report included the following information SBAR, Kardex, Intake/Output and MAR.

## 2022-03-13 NOTE — PROGRESS NOTES
1227: This RN noting fetal heart tones at 148 via EFM. Pt denies any leaking of fluid or vaginal bleeding and confirms feeling fetal movement.

## 2022-03-13 NOTE — PROGRESS NOTES
General Surgery Daily Progress Note    Patient: Adrianne Arvizu MRN: [id number]  SSN: [id number]    YOB: 1990  Age: 27 y.o. Sex: female      Admit Date: 3/10/2022    POD 2 Day Post-Op    Procedure: Procedure(s):  CHOLECYSTECTOMY ROBOTIC ASSISTED    Subjective: Kamini some diet  Denies N/V  Pain was better controlled yesterday when she got one dose of Toradol  Reports a lot of pain today    Current Facility-Administered Medications   Medication Dose Route Frequency    ketorolac (TORADOL) injection 30 mg  30 mg IntraVENous NOW    sodium chloride (NS) flush 5-40 mL  5-40 mL IntraVENous Q8H    sodium chloride (NS) flush 5-40 mL  5-40 mL IntraVENous PRN    acetaminophen (TYLENOL) tablet 1,000 mg  1,000 mg Oral Q6H    piperacillin-tazobactam (ZOSYN) 3.375 g in 0.9% sodium chloride (MBP/ADV) 100 mL MBP  3.375 g IntraVENous Q8H    lactated Ringers infusion  75 mL/hr IntraVENous CONTINUOUS    oxyCODONE IR (ROXICODONE) tablet 5 mg  5 mg Oral Q4H PRN    morphine injection 2 mg  2 mg IntraVENous Q2H PRN    lactated Ringers infusion 1,000 mL  1,000 mL IntraVENous CONTINUOUS    sodium chloride (NS) flush 5-40 mL  5-40 mL IntraVENous Q8H    sodium chloride (NS) flush 5-40 mL  5-40 mL IntraVENous PRN    ondansetron (ZOFRAN ODT) tablet 4 mg  4 mg Oral Q8H PRN    diphenhydrAMINE (BENADRYL) capsule 25 mg  25 mg Oral Q4H PRN    famotidine (PEPCID) tablet 20 mg  20 mg Oral Q12H PRN    cyclobenzaprine (FLEXERIL) tablet 5 mg  5 mg Oral TID PRN        Objective:   No intake/output data recorded. 03/11 1901 - 03/13 0700  In: 3345.4 [P.O.:760; I.V.:2585.4]  Out: 2994 [Urine:2300; Drains:255]  Patient Vitals for the past 8 hrs:   BP Temp Pulse Resp SpO2   03/13/22 1105 105/65 98.6 °F (37 °C) (!) 112 16 97 %   03/13/22 0801 104/65 98.6 °F (37 °C) (!) 122 17 94 %       Physical Exam:  General: Alert, cooperative, NAD  Lungs: Unlabored  Abdomen: Soft, appropriately TTP diffusely.   TONJA in place and serosanguinous.   Incisions C/D/I  Extremities: Warm, moves all, no edema  Skin:  Warm and dry, no rash    Labs:   Recent Labs     03/13/22  0358   WBC 7.6   HGB 7.4*   HCT 22.4*        Recent Labs     03/13/22  0358      K 3.5      CO2 20*   *   BUN 5*   CREA 0.44*   CA 7.7*   ALB 1.8*   TBILI 0.5   ALT 16       Assessment / Plan:     POD 1 Day Post-Op    Procedure: Procedure(s):  CHOLECYSTECTOMY ROBOTIC ASSISTED  Continue diet  Explained that she needs to do IS while awake  Continue TONJA  Recommend around the clock Tylenol   Will order one more dose of Toradol, Communicated with Dr. Cari Santos who agreed it was ok to give it          Terrie Mccray MD

## 2022-03-13 NOTE — ROUTINE PROCESS
0820-Chart accessed for review due to elevated MEWS 3. Pt tachycardic but reports having pain and is POD 2 from lap choly. Tachycardia since admission; noted to have hyperthyroid. Also noted to be pregnant, 22w gest. Will follow.  Khalida GODOY

## 2022-03-13 NOTE — PROGRESS NOTES
Bedside and Verbal shift change report given to Adalgisa Govea RN (oncoming nurse) by Shawn Murdock RN (offgoing nurse). Report included the following information SBAR, Kardex, ED Summary, Intake/Output, MAR and Recent Results.

## 2022-03-13 NOTE — PROGRESS NOTES
2050: This RN at bedside to assess FHT.  using doppler. Pt endorses good fetal movement, denies VB, leakage of fluid, or contractions. Pt resting comfortably at this time.

## 2022-03-13 NOTE — PROGRESS NOTES
0451: This RN at bedside to assess FHR. Fetal heart tones obtained via doppler. FHR of 142 with variability present. Patient reporting positive fetal movement. Patient denies contractions, vaginal bleeding or LOF. Patient verbalizes no additonial needs at this time.

## 2022-03-14 VITALS
HEIGHT: 62 IN | SYSTOLIC BLOOD PRESSURE: 130 MMHG | WEIGHT: 153 LBS | RESPIRATION RATE: 14 BRPM | OXYGEN SATURATION: 100 % | BODY MASS INDEX: 28.16 KG/M2 | DIASTOLIC BLOOD PRESSURE: 80 MMHG | TEMPERATURE: 97.8 F | HEART RATE: 102 BPM

## 2022-03-14 LAB
ABO + RH BLD: NORMAL
BLD PROD TYP BPU: NORMAL
BLOOD BANK CMNT PATIENT-IMP: NORMAL
BLOOD GROUP ANTIBODIES SERPL: NORMAL
BPU ID: NORMAL
CROSSMATCH RESULT,%XM: NORMAL
SPECIMEN EXP DATE BLD: NORMAL
STATUS OF UNIT,%ST: NORMAL
UNIT DIVISION, %UDIV: 0

## 2022-03-14 PROCEDURE — 74011250636 HC RX REV CODE- 250/636: Performed by: SURGERY

## 2022-03-14 PROCEDURE — 74011250637 HC RX REV CODE- 250/637: Performed by: SURGERY

## 2022-03-14 PROCEDURE — 74011000258 HC RX REV CODE- 258: Performed by: SURGERY

## 2022-03-14 PROCEDURE — 74011000250 HC RX REV CODE- 250: Performed by: STUDENT IN AN ORGANIZED HEALTH CARE EDUCATION/TRAINING PROGRAM

## 2022-03-14 PROCEDURE — 74011000250 HC RX REV CODE- 250: Performed by: SURGERY

## 2022-03-14 RX ORDER — OXYCODONE HYDROCHLORIDE 5 MG/1
5 TABLET ORAL
Qty: 12 TABLET | Refills: 0 | Status: SHIPPED | OUTPATIENT
Start: 2022-03-14 | End: 2022-03-17

## 2022-03-14 RX ORDER — AMOXICILLIN AND CLAVULANATE POTASSIUM 875; 125 MG/1; MG/1
1 TABLET, FILM COATED ORAL EVERY 12 HOURS
Qty: 10 TABLET | Refills: 0 | Status: SHIPPED | OUTPATIENT
Start: 2022-03-14 | End: 2022-03-22

## 2022-03-14 RX ADMIN — PIPERACILLIN AND TAZOBACTAM 3.38 G: 3; .375 INJECTION, POWDER, LYOPHILIZED, FOR SOLUTION INTRAVENOUS at 11:31

## 2022-03-14 RX ADMIN — SODIUM CHLORIDE, PRESERVATIVE FREE 10 ML: 5 INJECTION INTRAVENOUS at 07:03

## 2022-03-14 RX ADMIN — ACETAMINOPHEN 1000 MG: 500 TABLET ORAL at 02:47

## 2022-03-14 RX ADMIN — ACETAMINOPHEN 1000 MG: 500 TABLET ORAL at 08:16

## 2022-03-14 RX ADMIN — OXYCODONE 5 MG: 5 TABLET ORAL at 11:28

## 2022-03-14 RX ADMIN — ACETAMINOPHEN 1000 MG: 500 TABLET ORAL at 14:25

## 2022-03-14 RX ADMIN — PIPERACILLIN AND TAZOBACTAM 3.38 G: 3; .375 INJECTION, POWDER, LYOPHILIZED, FOR SOLUTION INTRAVENOUS at 04:50

## 2022-03-14 RX ADMIN — OXYCODONE 5 MG: 5 TABLET ORAL at 04:50

## 2022-03-14 RX ADMIN — SODIUM CHLORIDE, PRESERVATIVE FREE 5 ML: 5 INJECTION INTRAVENOUS at 14:26

## 2022-03-14 RX ADMIN — SODIUM CHLORIDE, PRESERVATIVE FREE 5 ML: 5 INJECTION INTRAVENOUS at 14:25

## 2022-03-14 RX ADMIN — SODIUM CHLORIDE, POTASSIUM CHLORIDE, SODIUM LACTATE AND CALCIUM CHLORIDE 75 ML/HR: 600; 310; 30; 20 INJECTION, SOLUTION INTRAVENOUS at 11:30

## 2022-03-14 NOTE — PROGRESS NOTES
Pharmacist Discharge Medication Reconciliation    Discharge Provider:  Dr. Chico Watson NP       Discharge Medications:      My Medications        START taking these medications        Instructions Each Dose to Equal Morning Noon Evening Bedtime   amoxicillin-clavulanate 875-125 mg per tablet  Commonly known as: AUGMENTIN    Your last dose was: Your next dose is: Take 1 Tablet by mouth every twelve (12) hours. 1 Tablet                 oxyCODONE IR 5 mg immediate release tablet  Commonly known as: Roxicodone    Your last dose was: Your next dose is: Take 1 Tablet by mouth every six (6) hours as needed for Pain for up to 3 days. Max Daily Amount: 20 mg.   5 mg                        CONTINUE taking these medications        Instructions Each Dose to Equal Morning Noon Evening Bedtime   PRENATAL-1 PO    Your last dose was: Your next dose is: Take  by mouth. Tylenol Extra Strength 500 mg tablet  Generic drug: acetaminophen    Your last dose was: Your next dose is: Take 500 mg by mouth every six (6) hours as needed for Pain. 500 mg                 Zofran 4 mg tablet  Generic drug: ondansetron hcl    Your last dose was: Your next dose is: Take 4 mg by mouth every eight (8) hours as needed for Nausea or Nausea or Vomiting. 4 mg                           Where to Get Your Medications        These medications were sent to [address], [address] [place] [address]      Phone: [phone]   amoxicillin-clavulanate 875-125 mg per tablet  oxyCODONE IR 5 mg immediate release tablet       S/p cholecystectomy - Augmentin and oxycodone upon discharge  Endocrinology consulted to evaluate patient's thyroid function tests. TSH slightly low but normal T4 = non-thyroid illness. No treatment recommended at this time.       The patient's chart, MAR, and AVS were reviewed by   Kateryna SILVA Jovani English,   [facility] 23: [phone]

## 2022-03-14 NOTE — PROGRESS NOTES
1430  Discharge orders in    Discharge education, instructions, and documents/AVS given to patient and verbalized understanding. Discharge patient after Zosyn infusion per Sadia Oliveira NP. 1hr left. Patient verbalized understanding. Called L&D and spoke with Axel Swartz. Stated she'll let someone know to check/do  Fetal Heart Monitor         1555  IV Zosyn finished    No pain. No distress. Respirations even and unlabored. No complaints. IV cannula aseptically removed, tip intact. No active bleeding noted. Patient stated that L&D nurse already saw her and Fetal Heart Monitoring done    TONJA drain patent and intatc. Drained 30cc of serosanguinous drainage. Dressing C/D/I. Patient to be d/c'd with TONJA drain. Patient to be transported by spouse.

## 2022-03-14 NOTE — PROGRESS NOTES
90 [facility] Virtual Visit with PCP Fracisco Raines NP for 03/22/2022 at 10:30am

## 2022-03-14 NOTE — PROGRESS NOTES
Bedside and Verbal shift change report given to JAYNE Chen (oncoming nurse) by Lucia Arzola (offgoing nurse). Report included the following information SBAR, Kardex, Procedure Summary, Intake/Output, MAR, Recent Results and Med Rec Status.

## 2022-03-14 NOTE — DISCHARGE INSTRUCTIONS
POST-OPERATIVE INSTRUCTIONS  OUTPATIENT SURGERY Robotic CHOLECYSTECTOMY    Today you underwent a robotic cholecystectomy which is usually well tolerated. However, below is a list of instructions which are important for you to follow. If you have any questions, please call your surgeons office. 1 EATING: Please eat lightly when you go home today for the first 24 hours. You may resume your regular diet after that. 2. EXERCISE: Limit your exercise for the first week, although stairs or other walking is fine. No strenuous activity (No lifting >10-15lbs) for one month. 3. DRESSING: You may shower in 1 day, unless otherwise instructed by your surgeon. No pools, baths, or hot tubs for 2 weeks. 4. NO LIFTING: No lifting >10lbs for 4 weeks from day of surgery    5. DRIVING: No driving while taking prescription pain medicine, and until post-operative discomfort resolves. Usually you may begin driving within 1-2 weeks. 6. PAIN: A prescription for pain has been given to you or your family. Please use it as prescribed and if this is inadequate, please call your surgeons office. In some cases, Tylenol or Advil may be adequate; and in that case, you will not need to fill the prescription. Please call for any refills during office hours. Pain medication may cause constipation - Colace or Milk of Magnesia may be used as needed. May take Tylenol 1000mg every 6 hours as needed for pain  May take Ibuprofen 600-800mg every 6 hours as needed for pain      7. WOUND: If you notice any increased redness, swelling, pain or fever, please call the office. If this is noticed at a time after normal office hours, please call our answering service. 8. APPOINTMENT: Your surgeon would like to see you in his/her in 14 days. If this appointment has not been made for you prior to your departure from Outpatient Surgery, please call your surgeons office to schedule your appointment.  If you have any questions or concerns, please do not hesitate to call your surgeon or any other staff member who will be able to assist you. [address]   [address] [facility], [facility] Kanu Payney  [phone]    Patient Education        Surgical Drain Care: Care Instructions  Your Care Instructions     After a surgery, fluid may collect inside your body in the surgical area. This makes an infection or other problems more likely. A surgical drain allows the fluid to flow out. The doctor puts a thin, flexible rubber tube into the area of your body where the fluid is likely to collect. The rubber tube carries the fluid outside your body. The most common type of surgical drain carries the fluid into a collection bulb that you empty. This is called a Harman-Nieves (TONJA) drain. The drain uses suction created by the bulb to pull the fluid from your body into the bulb. The rubber tube will probably be held in place by one or two stitches in your skin. The bulb will probably be attached with a safety pin to your clothes or near the bandage so that it doesn't flip around or pull on the stitches. Another type of drain is called a Penrose drain. This type of drain doesn't have a bulb. Instead, the end of the tube is open. That allows the fluid to drain onto a dressing taped to your skin. The drain may be kept in place next to your skin with a stitch or a safety pin in the tube. When you first get the drain, the fluid will be bloody. It will change color from red to pink to a light yellow or clear as the wound heals and the fluid starts to go away. Your doctor may give you information on when you no longer need the drain and when it will be removed. Follow-up care is a key part of your treatment and safety. Be sure to make and go to all appointments, and call your doctor if you are having problems. It's also a good idea to know your test results and keep a list of the medicines you take.   How do you empty the bulb of a Harman-Nieves drain? Follow any instructions your doctor gives you. How often you empty the bulb depends on how much fluid is draining. Empty the bulb when it is half full. 1. Wash your hands with soap and water. 2. Take the plug out of the bulb. 3. Empty the bulb. If your doctor asks you to measure the fluid, empty the fluid into a measuring cup, and write down the color and how much you collected. Your doctor will want to know this information. 4. Clean the plug with alcohol. 5. Squeeze the bulb until it is flat. This removes all the air from the bulb. You may need to put the bulb on a table or a counter to flatten it. 6. Keep the bulb flat, and put the plug in. The bulb should stay flat after you put the plug back in. This creates the suction that pulls the fluid into the bulb. 7. Empty the fluid into the toilet. 8. Wash your hands. How do you change the dressing around your surgical drain? You may have a dressing (bandage). The dressing is often made of gauze pads held on with tape. Your doctor will tell you how often to change it. 1. Wash your hands with soap and water. 2. Take off the dressing from around the drain. 3. Clean the drain site and the skin around it with soap and water. Use gauze or a cotton swab. 4. When the site is dry, put on a new dressing. The way your dressing is put on depends on what kind of drain you have. You will get instructions for your type of drain. 5. Wash your hands again with soap and water. Your doctor may ask you to keep track of your dressing changes. Write down the time of day and the amount and color of the fluid on the dressing. How do you help prevent clogs in your surgical drain? Squeezing or \"milking\" the tube of your surgical drain can help prevent clogs so that it drains correctly. Your doctor will tell you when you need to do this. In general, you do this when:  · You see a clot in the tube that prevents fluid from draining.  The clot may look like a dark, stringy lining. · You see fluid leaking around the tube where it goes into the skin. Follow these steps for milking the tube. 1. Use one hand to hold and pinch the tube where it leaves the skin. 2. With the thumb and first finger of your other hand, pinch the tube just below where you're holding it. 3. Slowly and firmly push your thumb and first finger down the tubing toward the end of the tube. 4. Repeat this as many times as needed to move the clot. If you have a Harman-Nieves (TONJA) drain, the clot should move down the tube and into the bulb. If you have a Penrose drain, the clot should move into the dressing. When should you call for help? Call your doctor now or seek immediate medical care if:    · You have signs of infection, such as:  ? Increased pain, swelling, warmth, or redness around the area. ? Red streaks leading from the area. ? Pus draining from the area. ? A fever.     · You see a sudden change in the color or smell of the drainage.     · The tube is coming loose where it leaves your skin. Watch closely for changes in your health, and be sure to contact your doctor if:    · You see a lot of fluid around the drain.     · You cannot remove a clot from the tube by milking the tube. Where can you learn more? Go to http://www.gray.com/  Enter K117 in the search box to learn more about \"Surgical Drain Care: Care Instructions. \"  Current as of: July 1, 2021               Content Version: 13.2  © 2006-2022 [employer]. Care instructions adapted under license by [employer] (which disclaims liability or warranty for this information). If you have questions about a medical condition or this instruction, always ask your healthcare professional. [place] [address] any warranty or liability for your use of this information.

## 2022-03-14 NOTE — CONSULTS
We are asked to comment on abnormal thyroid function tests in this 71-year-old woman 22 weeks gestation admitted for acute cholecystitis now status post laparoscopic cholecystectomy. This note is based entirely on review of the available electronic health record and a conversation with the patient. Patient was admitted with abdominal pain. Initial laboratory tests included a TSH of 0.27 with a free T4 of 1.0. Patient was noted to be tachycardic with a pulse of 114. The patient tells me today that she has never had a history of hyper or hypothyroidism. There is no family history of thyroid disease. She denies symptoms of palpitations, heart fluttering, nervousness, swelling in her neck, changes in her vision or changes in her eyes. Impression  1. Slight decrease in TSH with a normal free T4 suggestive of nonthyroidal illness  2. Intrauterine pregnancy x22 weeks    Recommendation, I would not treat this. I would recommend repeating the TSH in 4 to 6 weeks. I anticipate it normalizing.     Thank you very much for your kind referral.

## 2022-03-14 NOTE — DISCHARGE SUMMARY
Discharge Summary    Patient: Jocelyne Alexander               Sex: female          DOA: [unfilled]       YOB: 1990      Age:  27 y.o.        LOS:  LOS: 3 days                Admit Date: 3/10/2022    Discharge Date: 3/14/2022    Admission Diagnoses: Pregnancy [Z34.90]  Gallstones [K80.20]  22 weeks gestation of pregnancy [Z3A.22]  Tachycardia [R00.0]  Hypothyroidism [E03.9]    Discharge Diagnoses:    Problem List as of 3/14/2022 Date Reviewed: 7/15/2019          Codes Class Noted - Resolved    Pregnancy ICD-10-CM: Z34.90  ICD-9-CM: V22.2  3/10/2022 - Present        Hypothyroidism ICD-10-CM: E03.9  ICD-9-CM: 244.9  3/10/2022 - Present        Tachycardia ICD-10-CM: R00.0  ICD-9-CM: 785.0  3/10/2022 - Present        Gallstones ICD-10-CM: K80.20  ICD-9-CM: 574.20  3/10/2022 - Present        22 weeks gestation of pregnancy ICD-10-CM: Z3A.22  ICD-9-CM: V22.2  3/10/2022 - Present        Symptomatic cholelithiasis ICD-10-CM: K80.20  ICD-9-CM: 574.20  1/10/2022 - Present        Preeclampsia ICD-10-CM: O14.90  ICD-9-CM: 642.40  6/11/2020 - Present        Gestational hypertension ICD-10-CM: O13.9  ICD-9-CM: 642.30  6/9/2020 - Present        Pregnant and not yet delivered in third trimester ICD-10-CM: Z34.93  ICD-9-CM: V22.1  6/9/2020 - Present        SARAH II (cervical intraepithelial neoplasia II) ICD-10-CM: N87.1  ICD-9-CM: 622.12  6/12/2019 - Present              Discharge Condition: Good    Hospital Course: Audra Ganser a 27 y. o. female A-44-I-3-1-9-4 at 22w presented 3/10/22 w/ severe epigastric and right flank pain. Pt had recent hx of biliary colic/CBD stone and subsequent ERCP on 1/11/22 with Dr Sofia Nunez at [facility]. [facility] was consulted at the time, but due to the patient being in first trimester, surgery was delayed with pt to F/U outpatient.  Pt had yet to f/u with Gen Surg at the onset of this current episode.      Once in hospital, labs and imaging concerning for acute cholecystitis with stone in gallbladder neck.   Taken to OR on 3/11/22 for robotic assisted cholecystectomy. Pt had purulent drainage and perf of gallbladder and drain left in abdomen and kept on antibx. Pt did well overall post op. Some pain. Today feeling much better. Drain serosang. Passing flatus, no n/v. No stool yet, but pt states general constipation. Pt and  had all questions answered at bedside. Physical Exam:  General:         Alert, cooperative, NAD  Lungs:            Unlabored  Abdomen:      Soft, appropriately TTP but improved. TONJA in place and serosanguinous. Incisions C/D/I. Fundus appreciated. Extremities:   Warm, moves all, no edema  Skin:               Warm and dry, no rash    Significant Diagnostic Studies: see full electronic record. Discharge Medications:     Current Discharge Medication List      START taking these medications    Details   oxyCODONE IR (Roxicodone) 5 mg immediate release tablet Take 1 Tablet by mouth every six (6) hours as needed for Pain for up to 3 days. Max Daily Amount: 20 mg.  Qty: 12 Tablet, Refills: 0    Associated Diagnoses: Gallstones      amoxicillin-clavulanate (AUGMENTIN) 875-125 mg per tablet Take 1 Tablet by mouth every twelve (12) hours. Qty: 10 Tablet, Refills: 0         CONTINUE these medications which have NOT CHANGED    Details   ondansetron hcl (Zofran) 4 mg tablet Take 4 mg by mouth every eight (8) hours as needed for Nausea or Nausea or Vomiting. acetaminophen (Tylenol Extra Strength) 500 mg tablet Take 500 mg by mouth every six (6) hours as needed for Pain.      prenatal 43/HVOI fum/folic/dha (PRENATAL-1 PO) Take  by mouth. Activity: No lifting >10-15lbs, Driving on pain medication, or Strenuous exercise 1 month. Diet: Regular Diet- low fat for first few days    Wound Care: Keep wound clean and dry; drain care. Follow-up: 1 week with Dr Jimy Medrano seen and discharge discussed with Dr Katy Jones.   Carli SHABAZZ Trish Pearl, NP

## 2022-03-14 NOTE — PROGRESS NOTES
AntePartum Progress Note    Oma Lezama  22w5d    Patient admitted for abdominal pain now POD3 s/p laparoscopic cholecystectomy at 22w5d Estimated Date of Delivery: 22. Has abdominal pain now mostly when baby kicks the laparoscopic port sites, otherwise doing well. Has been OOB, amblates to bathroom. Voiding spontaneously. Endorses normal fetal movement, denies vaginal bleeding, contractions, LOF. Tolerating low fat diet. Vitals:  Patient Vitals for the past 24 hrs:   BP Temp Pulse Resp SpO2   22 0743 112/76 97.7 °F (36.5 °C) (!) 115 12 95 %   22 0319 119/77 98 °F (36.7 °C) (!) 104 18 95 %   22 2303 119/76 97.6 °F (36.4 °C) (!) 107 18 97 %   22 1839 111/78 97.8 °F (36.6 °C) 96 18 98 %   22 1543 108/74 97.6 °F (36.4 °C) 98 17 98 %   22 1105 105/65 98.6 °F (37 °C) (!) 112 16 97 %     Temp (24hrs), Av.9 °F (36.6 °C), Min:97.6 °F (36.4 °C), Max:98.6 °F (37 °C)    I&O:   No intake/output data recorded.  1901 -  0700  In: 240 [P.O.:240]  Out: 125 [Drains:125]      Exam:  Patient without distress. Abdomen soft, mildly distended, gravid, appropriately tender to palpation, no rebound or guarding               Fundus soft and non tender               Lower extremities edema No                                           Labs: No results found for this or any previous visit (from the past 24 hour(s)).     Assessment and Plan:   IUP @ 22w5d   Patient Active Problem List    Diagnosis Date Noted    Pregnancy 03/10/2022    Hypothyroidism 03/10/2022    Tachycardia 03/10/2022    Gallstones 03/10/2022    22 weeks gestation of pregnancy 03/10/2022    Symptomatic cholelithiasis 01/10/2022    Preeclampsia 2020    Gestational hypertension 2020    Pregnant and not yet delivered in third trimester 2020    SARAH II (cervical intraepithelial neoplasia II) 2019     POD#3 s/p [facility] partial cholecystectomy      Continue postop care per gen surgery. OB stable. FHT daily.

## 2022-03-14 NOTE — PROGRESS NOTES
3/14/2022  Reason for Admission:  Pregnancy, gallstones, s/p lap nicholas                   RUR Score:          11% green/low risk for readmission           Plan for utilizing home health:   Patient has no history, unlikely to need this admission       PCP: First and Last name:  Colby Schulte NP   Name of Practice:    Are you a current patient: Yes/No: Yes, but has not seen in years   Approximate date of last visit:    Can you participate in a virtual visit with your PCP:                     Current Advanced Directive/Advance Care Plan: Full Code    Healthcare Decision Maker:   Click here to complete [address] including selection of the Healthcare Decision Maker Relationship (ie \"Primary\")             Primary Decision Maker: Ortega Burton - Spouse - [phone]                  Transition of Care Plan:                    Patient lives with her  and other children in a single story home with 4 steps to enter. Prior to admission patient is independent with ADLs and drives. She has no history of home health. She had pre-eclampsia with her 4 previous pregnancies so she does have a blood pressure cuff at home, no other DME. Patient's emergency contact is her  Cris Keene who can be reached at [phone]. He will be coming to pick her up at discharge but will have to be planned carefully as her other children need to be picked up from school etc. Patient has seen RA Sosa for primary care but hasn't been in years--does see her OB. Patient uses [facility] in [place] for prescriptions and they are typically covered by insurance. No identified needs at this time. Transition of Care Plan   1. Continue medical management/treatment  2. Home with family when ready   3.  will transport at discharge   4. Follow up outpatient as needed  5. CM will continue to follow    Care Management Interventions  PCP Verified by CM:  Yes Robert Sosa NP)  Mode of Transport at Discharge:  Other (see comment) ()  Transition of Care Consult (CM Consult): Discharge Planning  Support Systems: Spouse/Significant Other,Child(sheryl)  Confirm Follow Up Transport: Family  Discharge Location  Patient Expects to be Discharged to[de-identified] Home with family assistance    GET Marquez

## 2022-03-15 NOTE — OP NOTES
[facility] 78  OPERATIVE REPORT    Name:  Nidhi Cavanaugh  MR#:  [id number]  :  1990  ACCOUNT #:  [de-identified]  DATE OF SERVICE:  2022    CLINICAL SERVICE:  General Surgery    PREOPERATIVE DIAGNOSIS:  Acute cholecystitis. POSTOPERATIVE DIAGNOSIS:  Perforated cholecystitis. PROCEDURE PERFORMED:  Davinci Assisted laparoscopic partial cholecystectomy. SURGEON:  Ailyn Perry MD    ASSISTANT:  See brief op note    ANESTHESIA:  general.    COMPLICATIONS:  None. SPECIMENS REMOVED:  Gallbladder. IMPLANTS:  none. ESTIMATED BLOOD LOSS:  25 ml. DRAINS/TUBES:  #10 flat TONJA. INDICATIONS:  The patient is a 67-year-old woman who presents to the emergency department with a right upper quadrant pain. She had previously been admitted with acute cholecystitis and underwent ERCP with sphincterotomy where there were stones and pus removed from the gallbladder. At that time, she was 12 weeks pregnant and no further procedure was undertaken due to her pregnancy status. At this time, she is 22 weeks pregnant and noted to have a large stone impacted in the neck of the gallbladder. With her recurrent symptoms and the stone in this location, the decision was made to bring her to the operating room for cholecystectomy. PROCEDURE:  After obtaining informed consent, the patient was brought to the operating room where she was laid supine on the operating room table. After induction of general endotracheal anesthesia, the patient was prepped and draped in a standard fashion. Surgical time-out was conducted and she had been receiving IV Zosyn and does not require an additional preoperative antibiotic. With time-out complete, 0.25% of Marcaine was infiltrated above the umbilicus and an 58-OZVDO scalpel was made. After palpating the uterus and ensuring that we were far enough away from the dome of the uterus, the Veress needle was placed.   With this needle in place, the abdomen was insufflated. After adequate insufflation, an 8-mm port was carefully placed through this incision. After this port was placed, further ports were placed under direct visualization, one in the right midabdomen, one in the left midabdomen, a  5-mm port in the right lower abdomen. With these ports in place, the abdomen was inspected and the gallbladder was noted to be significantly encased in omentum and there was purulent material noted in the pelvis. The patient was positioned head up and left side down and the robot was docked to the ports. I then proceeded to the operating console. Using combination of blunt dissection as well as hook electrocautery, the omentum was carefully attempted to be removed from the gallbladder. With careful tedious dissection, the wall of the gallbladder was revealed. In doing so, a pocket of purulent material was noted in an area of perforation. The wall of the gallbladder was significantly thickened and abnormal.  Once there was further clearance of the wall of the gallbladder, it was noted that the inflammatory changes and scarring were so dense and thick that it was not going to be possible to perform a cholecystectomy in the usual manner. Dissection was then undertaken to try to take the gallbladder down in a top-down fashion. With the wall as thick as it was, it was very challenging to try to find the plane behind the gallbladder. The anterior wall of the gallbladder was then removed and down as low as safe dissection could be undertaken. The large stone was then noted within the neck of the gallbladder. With careful blunt dissection, the stone was evacuated. With evacuation of the stone, there was bile that flowed into the remainder of the gallbladder. The posterior wall of the gallbladder was cauterized with Bovie electrocautery and the remaining anterior wall was closed using a #1 Stratafix to close the remainder of the gallbladder.   With this complete, the right upper quadrant was irrigated with normal saline and the pelvis was evacuated of this purulent material.  A #10-flat TONJA drain was placed in the liver bed and the wound bed was inspected and appeared to be hemostatic. The drain was brought out through the 5-mm port site. With this complete, the abdomen was desufflated, ports were removed and the drain was secured in place with 2-0 nylon suture. A 0 Vicryl was used to closed the umbilical port site and the skin of the remaining three ports were closed using a 4-0 Vicryl in subcuticular fashion. Skin was sealed with Dermabond. The patient tolerated the procedure well. There were no complications. All instrument, needle, sponge counts were correct at the end of the case.       Sarai David MD      [id number]  D:  03/14/2022 15:18  T:  03/14/2022 23:42  JOB #:  [id number]

## 2022-03-22 ENCOUNTER — VIRTUAL VISIT (OUTPATIENT)
Dept: FAMILY MEDICINE CLINIC | Age: 32
End: 2022-03-22
Payer: MEDICAID

## 2022-03-22 DIAGNOSIS — K21.9 GASTROESOPHAGEAL REFLUX DISEASE WITHOUT ESOPHAGITIS: ICD-10-CM

## 2022-03-22 DIAGNOSIS — R11.2 NAUSEA AND VOMITING, INTRACTABILITY OF VOMITING NOT SPECIFIED, UNSPECIFIED VOMITING TYPE: Primary | ICD-10-CM

## 2022-03-22 DIAGNOSIS — Z34.92 SECOND TRIMESTER PREGNANCY: ICD-10-CM

## 2022-03-22 DIAGNOSIS — Z90.49 HISTORY OF CHOLECYSTECTOMY: ICD-10-CM

## 2022-03-22 PROCEDURE — 99214 OFFICE O/P EST MOD 30 MIN: CPT | Performed by: NURSE PRACTITIONER

## 2022-03-22 RX ORDER — FAMOTIDINE 10 MG/1
10 TABLET ORAL 2 TIMES DAILY
COMMUNITY

## 2022-03-22 RX ORDER — ONDANSETRON 8 MG/1
8 TABLET, ORALLY DISINTEGRATING ORAL
Qty: 45 TABLET | Refills: 1 | Status: SHIPPED | OUTPATIENT
Start: 2022-03-22

## 2022-03-22 NOTE — PROGRESS NOTES
Pat Honeycutt is a 27 y.o. female who was seen by synchronous (real-time) audio-video technology on 3/22/2022 for Hospital Follow Up        [address] [place], [place] [address]way:   Diagnoses and all orders for this visit:    1. Nausea and vomiting, intractability of vomiting not specified, unspecified vomiting type  -     ondansetron (ZOFRAN ODT) 8 mg disintegrating tablet; Take 1 Tablet by mouth every eight (8) hours as needed for Nausea or Vomiting. Increase zofran to 8 mg every 8 hours. Continue low fat diet. Reviewed s/sx that warrant more immediate medical attention. Reach out with an update later this week on how sx have responded to dose adjustment. 2. History of cholecystectomy  Cleared by surgeon yesterday. 3. Gastroesophageal reflux disease without esophagitis  Recommended pt try increasing the 10 mg pepcid to BID dosing. Reach out if no improvement. Continue other supportive measures. 4. Second trimester pregnancy  Continue to follow up with  OB for ongoing management. I spent at least 20 minutes on this visit with this established patient. 712  Subjective:     Chief Complaint   Patient presents with   [facility] Follow Up     Patient vv appt today for hosp follow up. Pt was seen at [facility] on 3/10 due to pregnancy related issues-abdominal and back pain. US showed large gallstone. Partial Cholecysectomy was performed on 3/11. Pt has completed abx therapy. Pt had follow up appt with surgeon yesterday-drain removed and no further follow up needed. Pt notes severe vomiting still present. Was not having this problem prior to surgery. This has been problem that comes and goes. Some days are better than others. Surgeon recommended low fat diet which has not helped. Denies any associated constipation. BMs are back to normal.   Denies any persistent abdominal pain. Only pain is present where the drain was present.  Gallbladder ruptured so infection spread to various areas in the abdomen, will take longer for pain to resolve. Denies any fevers. Had n/v in 1st trimester that was not this severe. Have been treating with zofran 4 mg tab every 8 hours as needed with no relief noted. This is what OBGYN prescribed. Has not tried taking 2. Pt is currently 23wks pregnant. Next appt with them is on 4/1. Last vomited about 1 hour ago. This morning had a greek yogurt parfait with low fat yogurt and strawberries. Vomited 15 mins after. Had hyperemesis in her 1st pregnancy but this feels different. Denies any blood in the vomit. Has MOM she can take PRN. Also having a lot of GERD sx. Started pepcid yesterday one 10 mg daily. Prior to Admission medications    Medication Sig Start Date End Date Taking? Authorizing Provider   famotidine (PEPCID) 10 mg tablet Take 10 mg by mouth two (2) times a day. Yes Provider, Historical   ondansetron hcl (Zofran) 4 mg tablet Take 4 mg by mouth every eight (8) hours as needed for Nausea or Nausea or Vomiting. Yes Provider, Historical   acetaminophen (Tylenol Extra Strength) 500 mg tablet Take 500 mg by mouth every six (6) hours as needed for Pain. Yes Provider, Historical   prenatal 59/EFET fum/folic/dha (PRENATAL-1 PO) Take  by mouth. Yes Provider, Historical   amoxicillin-clavulanate (AUGMENTIN) 875-125 mg per tablet Take 1 Tablet by mouth every twelve (12) hours.   Patient not taking: Reported on 3/22/2022 3/14/22 3/22/22  Kvng Church NP     Patient Active Problem List    Diagnosis Date Noted    Pregnancy 03/10/2022    Hypothyroidism 03/10/2022    Tachycardia 03/10/2022    Gallstones 03/10/2022    22 weeks gestation of pregnancy 03/10/2022    Symptomatic cholelithiasis 01/10/2022    Preeclampsia 06/11/2020    Gestational hypertension 06/09/2020    Pregnant and not yet delivered in third trimester 06/09/2020    SARAH II (cervical intraepithelial neoplasia II) 06/12/2019     Current Outpatient Medications   Medication Sig Dispense Refill    famotidine (PEPCID) 10 mg tablet Take 10 mg by mouth two (2) times a day.  ondansetron (ZOFRAN ODT) 8 mg disintegrating tablet Take 1 Tablet by mouth every eight (8) hours as needed for Nausea or Vomiting. 45 Tablet 1    acetaminophen (Tylenol Extra Strength) 500 mg tablet Take 500 mg by mouth every six (6) hours as needed for Pain.  prenatal 58/FRNR fum/folic/dha (PRENATAL-1 PO) Take  by mouth. Allergies   Allergen Reactions    Latex Itching and Swelling    Iodine Itching and Swelling    Reglan [Metoclopramide] Other (comments)     Crawling out of skin       ROS    Objective:     Patient-Reported Vitals 3/22/2022   Patient-Reported Weight 153   Patient-Reported Height 52   Patient-Reported Pulse 92   Patient-Reported Temperature 98.1   Patient-Reported Systolic  046   Patient-Reported Diastolic 72   Patient-Reported LMP 10-7-21      General: alert, cooperative, no distress   Mental  status: normal mood, behavior, speech, dress, motor activity, and thought processes, able to follow commands   HENT: NCAT   Neck: no visualized mass   Resp: no respiratory distress                 Additional exam findings: We discussed the expected course, resolution and complications of the diagnosis(es) in detail. Medication risks, benefits, costs, interactions, and alternatives were discussed as indicated. I advised her to contact the office if her condition worsens, changes or fails to improve as anticipated. She expressed understanding with the diagnosis(es) and plan. Kimberley Gaines, was evaluated through a synchronous (real-time) audio-video encounter. The patient (or guardian if applicable) is aware that this is a billable service, which includes applicable co-pays. Verbal consent to proceed has been obtained.  The visit was conducted pursuant to the emergency declaration under the [facility] 1135 waiver authority and the [facility] and Response Supplemental Appropriations Act. Patient identification was verified, and a caregiver was present when appropriate. The patient was located at home in a state where the provider was licensed to provide care.     Bharti Simons NP

## 2022-03-22 NOTE — PROGRESS NOTES
Chief Complaint   Patient presents with   [facility] Follow Up     Patient vv appt today for hosp follow up. Pt was seen at [facility] on 3/10 due to pregnancy related issues-abdominal and back pain. US showed large gallstone. Partial Cholecysectomy was performed on 3/11. Pt has completed abx therapy. Pt had follow up appt with surgeon yesterday-drain removed,no further follow up needed. Pt notes severe vomiting still present. Have been treating with zofran with no relief noted. Pt is currently 23wks pregnant. 1. Have you been to the ER, urgent care clinic since your last visit? Hospitalized since your last visit? No    2. Have you seen or consulted any other health care providers outside of the [address] [place] [address] since your last visit? Include any pap smears or colon screening.  No

## 2022-12-19 ENCOUNTER — DOCUMENTATION ONLY (OUTPATIENT)
Dept: FAMILY MEDICINE CLINIC | Age: 32
End: 2022-12-19

## 2022-12-19 NOTE — PROGRESS NOTES
Called pt in regards to her mychart apt request for Postpartum Depression. LVM to call us back. Was going to see if pt wanted to come into office or a vv.

## (undated) DEVICE — COVER LT HNDL PLAS RIG 1 PER PK

## (undated) DEVICE — DEVICE LCK BILI RAP EXCHG OLPS --

## (undated) DEVICE — TOWEL SURG W17XL27IN STD BLU COT NONFENESTRATED PREWASHED

## (undated) DEVICE — NEEDLE SPNL 22GA L3.5IN BLK HUB S STL REG WALL FIT STYL W/

## (undated) DEVICE — SPHINCTEROTOME: Brand: HYDRATOME RX 44

## (undated) DEVICE — STRAP,POSITIONING,KNEE/BODY,FOAM,4X60": Brand: MEDLINE

## (undated) DEVICE — HEAD DONUT FOAM POSITIONER: Brand: CARDINAL HEALTH

## (undated) DEVICE — LARGE, DISPOSABLE ALEXIS O C-SECTION PROTECTOR - RETRACTOR: Brand: ALEXIS ® O C-SECTION PROTECTOR - RETRACTOR

## (undated) DEVICE — SUTURE 1 STRATAFIX SYMMETRIC PDS + 30CM CT-1 SXPP1A435

## (undated) DEVICE — RETRIEVAL BALLOON CATHETER: Brand: EXTRACTOR™ PRO RX

## (undated) DEVICE — DRAIN SURG W10XL20CM SIL SMOOTH FLAT 3/4 PERF DBL WRP

## (undated) DEVICE — TRAY PREP DRY W/ PREM GLV 2 APPL 6 SPNG 2 UNDPD 1 OVERWRAP

## (undated) DEVICE — SPHINCTEROTOME: Brand: JAGTOME RX 39

## (undated) DEVICE — AIRSEAL BIFURCATED SMOKE EVAC FILTERED TUBE SET: Brand: AIRSEAL

## (undated) DEVICE — STERILE POLYISOPRENE POWDER-FREE SURGICAL GLOVES: Brand: PROTEXIS

## (undated) DEVICE — BOWL MED M 16OZ PLAS CAP GRAD

## (undated) DEVICE — YANKAUER,BULB TIP,W/O VENT,RIGID,STERILE: Brand: MEDLINE

## (undated) DEVICE — CLIP INT M L POLYMER LOK LIG HEM O LOK

## (undated) DEVICE — 3000CC GUARDIAN II: Brand: GUARDIAN

## (undated) DEVICE — REM POLYHESIVE ADULT PATIENT RETURN ELECTRODE: Brand: VALLEYLAB

## (undated) DEVICE — SUTURE MCRYL SZ 4-0 L27IN ABSRB UD L19MM PS-2 1/2 CIR PRIM Y426H

## (undated) DEVICE — HANDLE LT SNAP ON ULT DURABLE LENS FOR TRUMPF ALC DISPOSABLE

## (undated) DEVICE — INFECTION CONTROL KIT SYS

## (undated) DEVICE — BLADELESS OBTURATOR: Brand: WECK VISTA

## (undated) DEVICE — SYR 10ML LUER LOK 1/5ML GRAD --

## (undated) DEVICE — RESERVOIR,SUCTION,100CC,SILICONE: Brand: MEDLINE

## (undated) DEVICE — SURGICAL PROCEDURE PACK BASIN MAJ SET CUST NO CAUT

## (undated) DEVICE — TIP CLEANER: Brand: VALLEYLAB

## (undated) DEVICE — ELECTRODE ES L11CM DIA5MM BALL SHFT RED DISP UTAHLOOP

## (undated) DEVICE — DEVICE TRNSF SPIK STL 2008S] MICROTEK MEDICAL INC]

## (undated) DEVICE — ROCKER SWITCH PENCIL HOLSTER: Brand: VALLEYLAB

## (undated) DEVICE — SPONGE: LAP 18X18 W  200/CS: Brand: MEDICAL ACTION INDUSTRIES

## (undated) DEVICE — STAPLER 60: Brand: SUREFORM

## (undated) DEVICE — C-SECTION II-LF: Brand: MEDLINE INDUSTRIES, INC.

## (undated) DEVICE — NEEDLE HYPO 22GA L1.5IN BLK POLYPR HUB S STL REG BVL STR

## (undated) DEVICE — SYRINGE IRRIG 60ML SFT PLIABLE BLB EZ TO GRP 1 HND USE W/

## (undated) DEVICE — SUTURE VCRL SZ 0 L36IN ABSRB VLT L40MM CT 1/2 CIR J358H

## (undated) DEVICE — PACK,LITHOTOMY,PK I: Brand: MEDLINE

## (undated) DEVICE — SOLIDIFIER FLUID 3000 CC ABSORB

## (undated) DEVICE — TOWEL,OR,DSP,ST,BLUE,STD,2/PK,40PK/CS: Brand: MEDLINE

## (undated) DEVICE — ELECTRO LUBE IS A SINGLE PATIENT USE DEVICE THAT IS INTENDED TO BE USED ON ELECTROSURGICAL ELECTRODES TO REDUCE STICKING.: Brand: KEY SURGICAL ELECTRO LUBE

## (undated) DEVICE — SOLUTION IV 1000ML 0.9% SOD CHL

## (undated) DEVICE — THE ENDO CARRY-ON PROCEDURE KIT CONTAINS ALL OF THE SUPPLIES AND INFECTION PREVENTION PRODUCTS NEEDED FOR ENDOSCOPIC PROCEDURES: Brand: ENDO CARRY-ON PROCEDURE KIT

## (undated) DEVICE — STRAP RESTRAIN W3.5XL19IN TECLIN STRRP POS LEG DURING LITH

## (undated) DEVICE — (D)PREP SKN CHLRAPRP APPL 26ML -- CONVERT TO ITEM 371833

## (undated) DEVICE — LAPAROSCOPIC TROCAR SLEEVE/SINGLE USE: Brand: KII® OPTICAL ACCESS SYSTEM

## (undated) DEVICE — INSUFFLATION NEEDLE: Brand: SURGINEEDLE

## (undated) DEVICE — ROBOTIC GENERAL-SFMC: Brand: MEDLINE INDUSTRIES, INC.

## (undated) DEVICE — PAD NON-ADHERENT 3X4 STRL LF --

## (undated) DEVICE — SUTURE COAT VCRL SZ 4-0 L18IN ABSRB UD L19MM PS-2 1/2 CIR J496G

## (undated) DEVICE — SUTURE VCRL SZ 3-0 L36IN ABSRB VLT CT L40MM 1/2 CIR J356H

## (undated) DEVICE — BLADE ASSEMB CLP HAIR FINE --

## (undated) DEVICE — SOL IRRIGATION INJ NACL 0.9% 500ML BTL

## (undated) DEVICE — MOUTHPIECE ENDOSCP 20X27MM --

## (undated) DEVICE — COVER MPLR TIP CRV SCIS ACC DA VINCI

## (undated) DEVICE — SUT CHRMC 3-0 36IN CT1 BRN --

## (undated) DEVICE — GLOVE SURG SZ 65 THK91MIL LTX FREE SYN POLYISOPRENE

## (undated) DEVICE — PAD,SANITARY,11 IN,MAXI,N-STRL,IND WRAP: Brand: MEDLINE

## (undated) DEVICE — ENDO KIT 1: Brand: MEDLINE INDUSTRIES, INC.

## (undated) DEVICE — CLICKLINE SCISSORS INSERT: Brand: CLICKLINE

## (undated) DEVICE — ARM DRAPE

## (undated) DEVICE — SEAL UNIV 5-8MM DISP BX/10 -- DA VINCI XI - SNGL USE

## (undated) DEVICE — SUTURE SZ 0 27IN 5/8 CIR UR-6  TAPER PT VIOLET ABSRB VICRYL J603H

## (undated) DEVICE — GARMENT,MEDLINE,DVT,INT,CALF,MED, GEN2: Brand: MEDLINE

## (undated) DEVICE — TRAY,URINE METER,100% SILICONE,16FR10ML: Brand: MEDLINE

## (undated) DEVICE — SYR 10ML CTRL LR LCK NSAF LF --

## (undated) DEVICE — LLETZ LOOP ELECTRODE, 20MM WIDE X 15MM DEEP, 11.8 CM SHAFT, BLUE: Brand: MEGADYNE

## (undated) DEVICE — DERMABOND SKIN ADH 0.7ML -- DERMABOND ADVANCED 12/BX

## (undated) DEVICE — ROCKER SWITCH PENCIL BLADE ELECTRODE, HOLSTER: Brand: EDGE